# Patient Record
Sex: MALE
[De-identification: names, ages, dates, MRNs, and addresses within clinical notes are randomized per-mention and may not be internally consistent; named-entity substitution may affect disease eponyms.]

---

## 2018-07-13 NOTE — RAD
Date of service: 



07/13/2018



PROCEDURE:  Radiographs of the pelvis.



HISTORY:

fall r/o fx



COMPARISON:

None.



FINDINGS:



BONES:

Pelvic Bones: Unremarkable.



Hips: Grossly unremarkable.



JOINTS:

Sacroiliac Joints: Unremarkable.



Pubic Symphysis: Unremarkable.



OTHER FINDINGS:

None.



IMPRESSION:

Unremarkable radiographs of the pelvis.

## 2018-07-13 NOTE — RAD
Date of service: 



07/13/2018



PROCEDURE:  Radiographs of the Lumbar Spine.



HISTORY:

fall r/o fx







COMPARISON:

No prior.



FINDINGS:



BONES:

Normal alignment. No listhesis. No fracture.



DISC SPACES:

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



OTHER FINDINGS:

None.



IMPRESSION:

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

## 2018-07-13 NOTE — CT
Date of service: 



07/13/2018



PROCEDURE:  CT HEAD WITHOUT CONTRAST.



HISTORY:

head injury r/o ICH



COMPARISON:

None available. 



TECHNIQUE:

Axial computed tomography images were obtained through the head/brain 

without intravenous contrast.  



Radiation dose:



Total exam DLP = 889 mGy-cm.



This CT exam was performed using one or more of the following dose 

reduction techniques: Automated exposure control, adjustment of the 

mA and/or kV according to patient size, and/or use of iterative 

reconstruction technique.



FINDINGS:



HEMORRHAGE:

No intracranial hemorrhage. 



BRAIN:

No mass effect or edema.  No atrophy or chronic microvascular 

ischemic changes.



VENTRICLES:

Unremarkable. No hydrocephalus. 



CALVARIUM:

Unremarkable.



PARANASAL SINUSES:

Unremarkable as visualized. No significant inflammatory changes.



MASTOID AIR CELLS:

Unremarkable as visualized. No inflammatory changes.



OTHER FINDINGS:

None.



IMPRESSION:

No acute findings

## 2018-07-13 NOTE — ED PDOC
Arrival/HPI





- General


Time Seen by Provider: 07/13/18 10:41


Historian: Patient, Family





- History of Present Illness


Narrative History of Present Illness (Text): 





07/13/18 10:50


84 year old male, whose PMH is HTN, HCL, Thyroid Disease, (medications include 

a water pill), who presents to the emergency department complaining of b/l hip  

and back side of head pain s/p mechanical fall x2. Family member reports 

patient uses walker for assistance and tripped while on the street one week ago

, injuring the back of his head. The second time patient fell was one day ago, 

also a mechanical fall, also injuring the back of his head. Patient states s/p 

fall he felt mildly dizzy and weak, which has resolved. Currently he has no 

pain. Patient denies LOC, chest pain, rib pain, shortness of breath, nausea, 

vomiting, diarrhea, abdominal pain, dysuria, fever, or other complaints. 





PMD: Dr. Galo 











Time/Duration: 1 week


Symptom Onset: Sudden


Symptom Course: Unchanged


Context: Tripped





Past Medical History





- Provider Review


Nursing Documentation Reviewed: Yes





Family/Social History





- Physician Review


Nursing Documentation Reviewed: Yes


Family/Social History: Unknown Family HX





Allergies/Home Meds


Allergies/Adverse Reactions: 


Allergies





No Known Allergies Allergy (Verified 07/13/18 10:40)


 








Home Medications: 


 Home Meds











 Medication  Instructions  Recorded  Confirmed


 


Acetaminophen [Non-Aspirin Pain 500 mg PO BID 07/13/18 07/13/18





Relief]   


 


Aspirin [Adult Low Dose Aspirin EC] 81 mg PO DAILY 07/13/18 07/13/18


 


Carvedilol [Coreg] 12.5 mg PO BID 07/13/18 07/13/18


 


Furosemide [Lasix] 40 mg PO DAILY 07/13/18 07/13/18


 


Glimepiride [amaRYL] 4 mg PO QAM 07/13/18 07/13/18


 


Levothyroxine [Synthroid] 25 mcg PO DAILY 07/13/18 07/13/18


 


Nateglinide [Starlix] 120 mg PO BID 07/13/18 07/13/18


 


Ramipril [Altace] 10 mg PO DAILY 07/13/18 07/13/18


 


Simvastatin 10 mg PO DAILY 07/13/18 07/13/18














Review of Systems





- Review of Systems


Constitutional: absent: Fevers


ENT: absent: Sinus Congestion


Respiratory: absent: SOB


Cardiovascular: absent: Chest Pain


Gastrointestinal: absent: Abdominal Pain


Genitourinary Male: absent: Frequency


Musculoskeletal: Back Pain (lower back ), Other (b/l hip pain )


Skin: absent: Rash


Neurological: Headache, Dizziness


Endocrine: absent: Diaphoresis





Physical Exam


Vital Signs











  Temp Pulse Resp BP Pulse Ox


 


 07/13/18 13:22   67  22   96


 


 07/13/18 10:40  98.1 F  84  24  154/92 H  97











Temperature: Afebrile


Blood Pressure: Normal


Pulse: Regular


Respiratory Rate: Normal


Appearance: Positive for: Well-Appearing, Non-Toxic, Comfortable


Pain Distress: None


Mental Status: Positive for: Alert and Oriented X 3





- Systems Exam


Head: Present: Atraumatic, Normocephalic


Pupils: Present: PERRL


Extroacular Muscles: Present: EOMI


Conjunctiva: Present: Normal


Mouth: Present: Moist Mucous Membranes


Neck: Present: Normal Range of Motion.  No: MIDLINE TENDERNESS, Paraspinal 

Tenderness


Respiratory/Chest: Present: Clear to Auscultation, Good Air Exchange.  No: 

Respiratory Distress, Accessory Muscle Use


Cardiovascular: Present: Regular Rate and Rhythm, Normal S1, S2.  No: Murmurs


Abdomen: Present: Normal Bowel Sounds.  No: Tenderness, Distention, Peritoneal 

Signs, Rebound, Guarding


Back: Present: Paraspinal Tenderness (lower lumbar paraspinal tenderness).  No: 

CVA Tenderness, Midline Tenderness, Pain with Leg Raise


Lower Extremity: Present: Normal Inspection, NORMAL PULSES, Normal ROM, 

Neurovascularly Intact, Capillary Refill < 2 s, Other (good hip rotation).  No: 

Edema, Cyanosis, Tenderness, Swelling, Erythema, Deformity


Neurological: Present: GCS=15, CN II-XII Intact, Speech Normal, Motor Func 

Grossly Intact, Normal Sensory Function, Normal Cerebellar Funct


Skin: Present: Warm, Dry, Normal Color.  No: Rashes


Psychiatric: Present: Alert, Oriented x 3, Normal Insight, Normal Concentration





Medical Decision Making


ED Course and Treatment: 





07/13/18 


Impression:


84 year old male with lower lumbar paraspinal tenderness complaining of left 

sided hip pain and head trauma s/p mechanical fall. 








Differential Diagnosis included but are not limited to:  Mechanical fall w/ 

head injury


Plan:


-- CT head


-- LS spine x-ray


-- Pelvis x-ray 


-- Tylenol 


-- Reassess and disposition








Progress Notes:








07/13/18 12:10


Creator: Asher Kwan 





Lumbar Spine x-ray


Impressions: Severe degenration at L1-2 and L3-4





Pelvis x-ray: 


Impressions: Unremarkable radiographs of the pelvis.





07/13/18 12:15


CT head


Impressions: No acute disease








07/13/18 13:22


Patients Xrays and CT are with no fracture or ICH. Patient is comfortable and 

has no pain. He is able to walk with walker at baseline. Case discussed with 

patient and his family. He will go home and f/u with his PMD. He was advised to 

return to the ED if symptoms worsen or any other concern. 





- RAD Interpretation


Radiology Orders: 








07/13/18 10:48


HEAD W/O CONTRAST [CT] Stat 


LS SPINE WITH OBL > 18 YRS OLD [RAD] Stat 


PELVIS ONE VIEW [RAD] Stat 











: Radiologist





- Medication Orders


Current Medication Orders: 











Discontinued Medications





Acetaminophen (Tylenol 325mg Tab)  650 mg PO STAT STA


   Stop: 07/13/18 10:50











- Scribe Statement


The provider has reviewed the documentation as recorded by the Darenibe





Alexus Martinez





Provider Scribe Attestation:


All medical record entries made by the Scribe were at my direction and 

personally dictated by me. I have reviewed the chart and agree that the record 

accurately reflects my personal performance of the history, physical exam, 

medical decision making, and the department course for this patient. I have 

also personally directed, reviewed, and agree with the discharge instructions 

and disposition. 





Disposition/Present on Arrival





- Present on Arrival


Any Indicators Present on Arrival: No





- Disposition


Have Diagnosis and Disposition been Completed?: Yes


Diagnosis: 


 Fall, Head injury, Hip pain, bilateral





Disposition: HOME/ ROUTINE


Disposition Time: 13:22


Patient Plan: Discharge


Condition: IMPROVED


Discharge Instructions (ExitCare):  Closed Head Injury


Additional Instructions: 





ZOEY DOUGHERTY, thank you for letting us take care of you today. Your 

provider was Sergio Banda DO and you were treated for Mechanical Fall, Hip 

Pain , Head Injury. The emergency medical care you received today was directed 

at your acute symptoms. If you were prescribed any medication, please fill it 

and take as directed. It may take several days for your symptoms to resolve. 

Return to the Emergency Department if your symptoms worsen, do not improve, or 

if you have any other problems.





Please contact your doctor or call one of the physicians/clinics you have been 

referred to that are listed on the Patient Visit Information form that is 

included in your discharge packet. Bring any paperwork you were given at 

discharge with you along with any medications you are taking to your follow up 

visit. Our treatment cannot replace ongoing medical care by a primary care 

provider outside of the emergency department.





Thank you for allowing the [facility] team to be part of your care today.








If you had an X-Ray or CT scan: A Radiologist will review the ED reading if any 

change in treatment is needed we will contact you.***





If you had a blood, urine, or wound culture: It will take several days for the 

results, if any change in treatment is needed we will contact you.***





If you had an STI test: It will take 48 hours for the results. Please call 

after 1 week if you have not heard back.***


Referrals: 


Non [facility] Provider, [Non-Staff] - Follow up with primary


Forms:  [employer] (English)

## 2018-09-29 NOTE — ED PDOC
Arrival/HPI





- General


Chief Complaint: Male Genitourinary


Time Seen by Provider: 09/29/18 09:16


Historian: Patient, Family (Nephew)





- History of Present Illness


Time/Duration: Other (3 months)


Symptom Onset: Gradual


Symptom Course: Unchanged


Quality: Aching


Severity Level: Mild


Activities at Onset: Rest


Associated Symptoms (Text): 





09/29/18 09:27


Nephew reports a 3 month history of right-sided abdominal pain with urinary 

frequency and urgency. He was seen at another hospital in [place] for similar 

complaints and the nephew is not aware of the diagnosis. No nausea or vomiting 

or diarrhea. No hematuria. No back pain. No fever or chills. No injury or 

trauma.





Past Medical History





- Cardiac


Hx Cardiac Disorders: Yes


Hx MI: Yes


Hx Hypertension: Yes





- Pulmonary


Hx Respiratory Disorders: No





- Neurological


Hx Neurological Disorder: No





- HEENT


Hx HEENT Disorder: Yes


Hx Cataracts: Yes





- Renal


Hx Renal Disorder: No





- Endocrine/Metabolic


Hx Endocrine Disorders: Yes


Hx Diabetes Mellitus Type 2: Yes


Hx Hypothyroidism: Yes





- Hematological/Oncological


Hx Blood Disorders: No





- Integumentary


Hx Dermatological Disorder: No





- Musculoskeletal/Rheumatological


Hx Musculoskeletal Disorders: No





- Gastrointestinal


Hx Gastrointestinal Disorders: No





- Genitourinary/Gynecological


Hx Genitourinary Disorders: No





- Psychiatric


Hx Psychophysiologic Disorder: No


Hx Substance Use: No





- Surgical History


Hx Open Heart Surgery: Yes





Family/Social History





- Physician Review


Nursing Documentation Reviewed: Yes


Family/Social History: Unknown Family HX


Smoking Status: Never Smoked


Hx Alcohol Use: No


Hx Substance Use: No





Allergies/Home Meds


Allergies/Adverse Reactions: 


Allergies





No Known Allergies Allergy (Verified 09/29/18 09:18)


   








Home Medications: 


                                    Home Meds











 Medication  Instructions  Recorded  Confirmed


 


Aspirin [Adult Low Dose Aspirin EC] 81 mg PO DAILY 07/13/18 09/29/18


 


Carvedilol [Coreg] 12.5 mg PO BID 07/13/18 09/29/18


 


Furosemide [Lasix] 40 mg PO DAILY 07/13/18 09/29/18


 


Glimepiride [amaRYL] 4 mg PO QAM 07/13/18 09/29/18


 


Levothyroxine [Synthroid] 25 mcg PO DAILY 07/13/18 09/29/18


 


Nateglinide [Starlix] 120 mg PO BID 07/13/18 09/29/18


 


Ramipril [Altace] 10 mg PO DAILY 07/13/18 09/29/18


 


Simvastatin 10 mg PO DAILY 07/13/18 09/29/18














Review of Systems





- Physician Review


All systems were reviewed & negative as marked: Yes





- Review of Systems


Constitutional: Fatigue.  absent: Fevers


Respiratory: absent: SOB, Cough, Wheezing


Cardiovascular: absent: Chest Pain, Palpitations, Syncope


Gastrointestinal: Abdominal Pain, Anorexia.  absent: Constipation, Diarrhea, 

Nausea, Vomiting


Genitourinary Male: Dysuria, Frequency.  absent: Hematuria


Musculoskeletal: absent: Back Pain


Neurological: absent: Headache, Dizziness, Focal Weakness





Physical Exam





Vital Signs











  Temp Pulse Resp BP Pulse Ox


 


 09/29/18 09:15  98.1 F  69  18  153/87 H  99











Temperature: Afebrile


Blood Pressure: Hypertensive


Pulse: Regular


Respiratory Rate: Normal


Appearance: Positive for: Well-Appearing, Non-Toxic, Comfortable, Other (pale)


Pain Distress: None


Mental Status: Positive for: other (awake and alert)





- Systems Exam


Head: Present: Atraumatic, Normocephalic


Pupils: Present: PERRL


Extroacular Muscles: Present: EOMI


Conjunctiva: Present: Normal


Mouth: Present: Moist Mucous Membranes


Pharnyx: No: ERYTHEMA, EXUDATE, TONSILS ENLARGED


Neck: Present: Normal Range of Motion


Respiratory/Chest: Present: Clear to Auscultation, Good Air Exchange, Decreased 

Breath Sounds.  No: Respiratory Distress, Accessory Muscle Use


Cardiovascular: Present: Regular Rate and Rhythm, Normal S1, S2.  No: Murmurs


Abdomen: Present: Tenderness (+/- right lower quadrant tenderness).  No: 

Distention, Peritoneal Signs, Rebound, Guarding


Genitourinary Male: Present: Normal External Genitalia


Back: Present: Normal Inspection.  No: CVA Tenderness, Midline Tenderness, 

Paraspinal Tenderness


Upper Extremity: Present: Normal Inspection.  No: Cyanosis, Edema


Lower Extremity: Present: Normal Inspection.  No: Edema


Neurological: Present: GCS=15, CN II-XII Intact, Speech Normal, Motor Func 

Grossly Intact


Skin: Present: Warm, Dry, Pale.  No: Rashes


Psychiatric: Present: Alert, Oriented x 3, Normal Insight, Normal Concentration





Medical Decision Making


ED Course and Treatment: 





09/29/18 11:25


EKG shows normal sinus rhythm with a sinus bradycardia rate approximately 55 

with PACs, LVH, nonspecific intraventricular conduction delay and nonspecific ST

and T-wave changes laterally








PROCEDURE:  CT Abdomen and Pelvis without intravenous contrast


Dictator : Avtar Prieto MD


Report Date : 09/29/2018 12:07:13


IMPRESSION:


Cholelithiasis without CT evidence of acute cholecystitis.  





Additional benign and/or incidental findings described above.





Findings in the spinal canal which would benefit from additional clinical 

information and possible elective follow-up.


09/29/18 12:15


Discussed in detail with the patient and nephew. Will provide a primary 

caregiver in this area, as he is from [place]. No acute findings on his workup. 

Follow up in the ER as needed.





- RAD Interpretation


Radiology Orders: 











09/29/18 09:25


ABD & PELVIS W/O PO OR IV CONT [CT] Stat 








CT scan of the abdomen and pelvis is read by the radiologist is positive for 

cholelithiasis without cholecystitis and a right inguinal hernia. No acute 

findings.


: Radiologist





Disposition/Present on Arrival





- Present on Arrival


Any Indicators Present on Arrival: No


History of DVT/PE: No


History of Uncontrolled Diabetes: No


Urinary Catheter: No


History of Decub. Ulcer: No


History Surgical Site Infection Following: None





- Disposition


Have Diagnosis and Disposition been Completed?: Yes


Diagnosis: 


 Dysuria, Abdominal pain, Cholelithiasis, Right inguinal hernia





Disposition: HOME/ ROUTINE


Disposition Time: 12:17


Patient Plan: Discharge


Condition: GOOD


Discharge Instructions (ExitCare):  Inguinal and Femoral (Groin) Hernias, 

Chronic Pain (DC), Dysuria, Adult (DC), Gallstones


Prescriptions: 


Tramadol HCl [Ultram] 50 mg PO Q6 PRN #10 tab


 PRN Reason: Pain


Referrals: 


Avtar Corcoran DO [Staff Provider] - Follow up with primary


Forms:  [employer] (English)

## 2018-09-29 NOTE — CT
Date of service: 



09/29/2018



PROCEDURE:  CT Abdomen and Pelvis without intravenous contrast



HISTORY:

right sided pain



COMPARISON:

None.



TECHNIQUE:

Unenhanced.  Neither IV nor oral contrast administered



Radiation dose:



Total exam DLP = 338.56 mGy-cm.



This CT exam was performed using one or more of the following dose 

reduction techniques: Automated exposure control, adjustment of the 

mA and/or kV according to patient size, and/or use of iterative 

reconstruction technique.



FINDINGS:



LOWER THORAX:

Trace bilateral pleural effusions. 



LIVER:

Unremarkable. No gross lesion or ductal dilatation.  



GALLBLADDER AND BILE DUCTS:

Cholelithiasis without CT evidence of acute cholecystitis.   



PANCREAS:

Unremarkable. No gross lesion or ductal dilatation.



SPLEEN:

Unremarkable. 



ADRENALS:

Unremarkable. No mass. 



KIDNEYS AND URETERS:

Unremarkable. No hydronephrosis. No solid mass. 



VASCULATURE:

Unremarkable. No aortic aneurysm. 



BOWEL:

Unremarkable. No obstruction. No gross mural thickening. 



APPENDIX:

No abnormalities to suggest acute appendicitis. No right lower 

quadrant inflammatory processes identified. 



PERITONEUM:

Unremarkable. No free fluid. No free air. 



LYMPH NODES:

Unremarkable. No enlarged lymph nodes. 



BLADDER:

Bladder wall thickening likely relates to underfilling rather than 

cystitis. 



REPRODUCTIVE:

Unremarkable. 



BONES:

No acute fracture.  Intradural calcifications at the level of lower 

lumbar and upper sacral region.  The etiology, significance is 

uncertain.  These findings are usually seen in the setting 

parathyroid or renal disease common nephrogenic fibrosis.  This 

sequela of prior hemorrhage or trauma could also assume this 

appearance.  Elective follow-up recommended. 



OTHER FINDINGS:

Right inguinal hernia containing distal small bowel and possibly 

cecum.



IMPRESSION:

Cholelithiasis without CT evidence of acute cholecystitis.  



Additional benign and/or incidental findings described above.



Findings in the spinal canal which would benefit from additional 

clinical information and possible elective follow-up.

## 2018-09-30 NOTE — CARD
--------------- APPROVED REPORT --------------





Date of service: 09/29/2018



EKG Measurement

Heart [id number]

OR [id number]

[id number]

[id number]

[id number]



<Conclusion>

Sinus bradycardia with premature supraventricular complexes

Incomplete left bundle branch block

Left ventricular hypertrophy with repolarization abnormality

STTW changes c/w ischemia

## 2019-02-11 NOTE — ED PDOC
Arrival/HPI





- General


Chief Complaint: Shortness Of Breath


Time Seen by Provider: 02/11/19 20:11


Historian: Patient





- History of Present Illness


Narrative History of Present Illness (Text): 





02/11/19 20:53


85 year old male, whose past medical history includes asthma, hypertension, 

hypercholesterolemia, thyroid Disease, and non-insulin dependent diabetes, 

presents to the emergency department complaining of shortness of breath, for 3 

days. The patient is accompanied by his grandson, who explains that patient has 

no PMD in the area, and is mostly residing in [place]. He states that patient 

has a hernia, with no surgical intervention.  Patient states he has no pain, 

just feels ill and short of breath.  Patient denies any chest pain, fevers, 

chills, headache, dizziness, abdominal pain, nausea, vomiting, diarrhea, back 

pain, neck pain, or any other complaint.


 











Time/Duration: < week (3 days)


Symptom Onset: Gradual


Symptom Course: Unchanged


Quality: Unable to Describe


Severity Level: Moderate


Activities at Onset: Rest


Context: Home





Past Medical History





- Provider Review


Nursing Documentation Reviewed: Yes





- Infectious Disease


Hx of Infectious Diseases: None





- Cardiac


Hx Cardiac Disorders: Yes


Hx MI: Yes


Hx Hypertension: Yes





- Pulmonary


Hx Respiratory Disorders: No





- Neurological


Hx Neurological Disorder: No





- HEENT


Hx HEENT Disorder: Yes


Hx Cataracts: Yes





- Renal


Hx Renal Disorder: No





- Endocrine/Metabolic


Hx Endocrine Disorders: Yes


Hx Diabetes Mellitus Type 2: Yes


Hx Hypothyroidism: Yes





- Hematological/Oncological


Hx Blood Disorders: No





- Integumentary


Hx Dermatological Disorder: No





- Musculoskeletal/Rheumatological


Hx Musculoskeletal Disorders: No





- Gastrointestinal


Hx Gastrointestinal Disorders: No





- Genitourinary/Gynecological


Hx Genitourinary Disorders: No





- Psychiatric


Hx Psychophysiologic Disorder: No


Hx Substance Use: No





- Surgical History


Hx Open Heart Surgery: Yes





Family/Social History





- Physician Review


Nursing Documentation Reviewed: Yes


Family/Social History: No Known Family HX


Smoking Status: Never Smoked


Hx Alcohol Use: No


Hx Substance Use: No





Allergies/Home Meds


Allergies/Adverse Reactions: 


Allergies





No Known Allergies Allergy (Verified 09/29/18 09:18)


   








Home Medications: 


                                    Home Meds











 Medication  Instructions  Recorded  Confirmed


 


Aspirin [Adult Low Dose Aspirin EC] 81 mg PO DAILY 07/13/18 02/11/19


 


Carvedilol [Coreg] 12.5 mg PO BID 07/13/18 02/11/19


 


Furosemide [Lasix] 40 mg PO DAILY 07/13/18 02/11/19


 


Glimepiride [amaRYL] 4 mg PO QAM 07/13/18 02/11/19


 


Levothyroxine [Synthroid] 25 mcg PO DAILY 07/13/18 02/11/19


 


Nateglinide [Starlix] 120 mg PO BID 07/13/18 02/11/19


 


RX: Ramipril [Altace] 10 mg PO DAILY 07/13/18 02/11/19


 


RX: Simvastatin 10 mg PO DAILY 07/13/18 02/11/19














Review of Systems





- Physician Review


All systems were reviewed & negative as marked: Yes





- Review of Systems


Constitutional: absent: Fevers, Night Sweats


Respiratory: SOB


Cardiovascular: absent: Chest Pain


Gastrointestinal: absent: Abdominal Pain, Diarrhea, Nausea, Vomiting


Musculoskeletal: absent: Back Pain, Neck Pain


Neurological: absent: Headache, Dizziness





Physical Exam


Vital Signs Reviewed: Yes





Vital Signs











  Pulse Resp BP Pulse Ox


 


 02/11/19 20:21  67  18  126/65  90 L











Blood Pressure: Normal


Pulse: Regular


Respiratory Rate: Normal


Appearance: Positive for: Well-Appearing, Non-Toxic, Comfortable


Pain Distress: None


Mental Status: Positive for: Alert and Oriented X 3





- Systems Exam


Head: Present: Atraumatic, Normocephalic


Pupils: Present: PERRL


Extroacular Muscles: Present: EOMI


Conjunctiva: Present: Normal


Mouth: Present: Moist Mucous Membranes


Neck: Present: Normal Range of Motion


Respiratory/Chest: Present: Clear to Auscultation, Good Air Exchange.  No: 

Respiratory Distress, Accessory Muscle Use


Cardiovascular: Present: Regular Rate and Rhythm, Normal S1, S2.  No: Murmurs


Abdomen: Present: Tenderness (LLQ tenderness), Distention.  No: Rebound, 

Guarding


Back: Present: Normal Inspection


Upper Extremity: Present: Normal Inspection.  No: Cyanosis, Edema


Lower Extremity: Present: Edema (Piting edema)


Neurological: Present: GCS=15, Speech Normal


Skin: Present: Warm, Dry, Normal Color.  No: Rashes


Psychiatric: Present: Alert, Oriented x 3, Normal Insight, Normal Concentration





Medical Decision Making


ED Course and Treatment: 





02/11/19 21:21


Impression: 


85 year old male presents with shortness of breath





Plan:


-- VBG


--Labs


-- EKG


-- Chest X-ray


-- Urinalysis 


--Aspirin


--Lasix


-- Reassess and disposition





Prior Visits:


Notes and results from previous visits were reviewed. 





Progress Notes:


02/11/19 21:37


Labs reviewed with elevated troponin of 0.46 and BNP of 13,700. Rectal aspirin 

ordered. 





02/11/19 21:47


Spoke to medical resident. Page placed to Dr. SAMUEL Arias(house staff). Lasix 

ordered. 











- Lab Interpretations


Lab Results: 





                                        











pO2  18 mm/Hg (30-55)  L  02/11/19  20:35    


 


VBG pH  7.37  (7.32-7.43)   02/11/19  20:35    


 


VBG pCO2  47.0  (40-60)   02/11/19  20:35    


 


VBG HCO3  27.2 mmol/l (21-28)   02/11/19  20:35    


 


VBG Total CO2  28.6 mmol.L (22-28)  H  02/11/19  20:35    


 


VBG O2 Sat (Calc)  35.0 % (40-65)  L  02/11/19  20:35    


 


VBG Base Excess  1.3 mmol/L (0.0-2.0)   02/11/19  20:35    


 


VBG Potassium  3.6 mmol/L (3.6-5.2)   02/11/19  20:35    


 


Sodium  140.0 mmol/L (132-148)   02/11/19  20:35    


 


Chloride  105.0 mmol/L ()   02/11/19  20:35    


 


Glucose  227 mg/dl ()  H  02/11/19  20:35    


 


Lactate  3.3 mmol/L (0.7-2.1)  H  02/11/19  20:35    


 


FiO2  21.0 %  02/11/19  20:35    


 


Crit Value Called To  Luisito vidal   02/11/19  20:35    


 


Crit Value Called By  Rohit   02/11/19  20:35    


 


Blood Gas Notified Time  2046 02/11/19  20:35    

















                                 02/11/19 20:41 





                                 02/11/19 20:41 





                                   Lab Results





02/11/19 20:41: Sodium 139, Chloride 103, Potassium 4.0, Carbon Dioxide 27, 

Anion Gap 13, BUN 27 H, Creatinine 1.3, Est GFR (African Amer) > 60, Est GFR 

(Non-Af Amer) 52, Random Glucose 221 H, Calcium 9.3, Magnesium 2.0, Total 

Bilirubin 2.0 H, AST 29, ALT < 6 L, Alkaline Phosphatase 68, Troponin I 0.46 H* 

D, NT-Pro-B Natriuret Pep 54633 H, Total Protein 6.8, Albumin 3.9, Globulin 3.0,

 Albumin/Globulin Ratio 1.3


02/11/19 20:41: PT 16.9 H, INR 1.52, APTT 32.2


02/11/19 20:41: WBC 5.3, RBC 4.31, Hgb 13.7 L, Hct 41.9 L, MCV 97.2, MCH 31.8, 

MCHC 32.7, RDW 13.7, Plt Count 175, MPV 9.9, Neut % (Auto) 77.8 H, Lymph % 

(Auto) 11.2 L, Mono % (Auto) 8.4 H, Eos % (Auto) 2.4, Baso % (Auto) 0.2, Lymph #

 (Auto) 0.6 L, Mono # (Auto) 0.5, Eos # (Auto) 0.1, Baso # (Auto) 0.01, Absolute

 Neuts (auto) 4.15


02/11/19 20:35: pO2 18 L, VBG pH 7.37, VBG pCO2 47.0, VBG HCO3 27.2, VBG Total 

CO2 28.6 H, VBG O2 Sat (Calc) 35.0 L, VBG Base Excess 1.3, VBG Potassium 3.6, 

Sodium 140.0, Chloride 105.0, Glucose 227 H, Lactate 3.3 H, FiO2 21.0, Crit 

Value Called To Luisito vidal, Crit Value Called By Rohit, Blood Gas Notified 

Time 2046, Venous Blood Potassium 3.6








I have reviewed the lab results: Yes





- RAD Interpretation


Radiology Orders: 











02/11/19 20:22


CHEST PORTABLE [RAD] Stat 














- EKG Interpretation


EKG Interpretation (Text): 





02/11/19 21:39


NSR @ 68bpm


LVH w/ inverted T waves in V4-V6


LBBB


Prolonged QT interval





Interpreted by ED Physician: Yes


Type: 12 lead EKG





- Medication Orders


Current Medication Orders: 





02/15/19 07:46











Acetaminophen (Tylenol 325mg Tab)  650 mg PO Q6 PRN


   PRN Reason: TEMP>=99.5F


Acetaminophen (Tylenol 650 Mg Supp)  650 mg RC Q6H PRN


   PRN Reason: TEMP>=99.5F


Acetylcysteine (Acetylcysteine 20%)  4 ml IH Q8 [facility]


   Last Admin: 02/15/19 07:32 Dose:  Not Given


   Non-Admin Reason: not available





Atorvastatin Calcium (Lipitor)  40 mg PO DIN [facility]


   Last Admin: 02/14/19 16:50 Dose:  Not Given


   Non-Admin Reason: bleeding





Carvedilol (Coreg)  12.5 mg PO BID [facility]


   Last Admin: 02/14/19 11:11  Dose: 12.5 mg





MAR Pulse and Blood Pressure


 Document     02/14/19 11:11  LAUREN  (Rec: 02/14/19 11:12  LAUREN  [id number])


     Pulse


      Pulse Rate (60-90 beats/min)               65





Dextrose (Dextrose 50% Inj)  0 ml IV STAT PRN; Protocol


   PRN Reason: Hypoglycemia Protocol


   Last Admin: 02/14/19 21:52  Dose: 50 ml





eMAR Start Stop


 Document     02/14/19 21:52  MHA  (Rec: 02/14/19 21:53  MHA  [id number])


     Intravenous Solution


      Start Date                                 02/14/19


      Start Time                                 21:53





Docusate Sodium (Colace)  100 mg PO TID [facility]


   Last Admin: 02/14/19 16:43 Dose:  Not Given


   Non-Admin Reason: bleeding





Last Bowel Movement


 Document     02/14/19 16:43  LAUREN  (Rec: 02/14/19 16:44  LAUREN  [id number])


     Last Bowel Movement


      Last Bowel Movement                        02/14/19





Famotidine (Pepcid)  40 mg PO HS [facility]


   Last Admin: 02/14/19 21:36  Dose: 40 mg





Furosemide (Lasix)  40 mg IVP DAILY [facility]


   Last Admin: 02/14/19 11:12  Dose: 40 mg





MAR Blood Pressure


 Document     02/14/19 11:12  LAUREN  (Rec: 02/14/19 11:13  LAUREN  [id number])


     Blood Pressure


      Blood Pressure (100//90 mm Hg)       138/68


IVP Administration


 Document     02/14/19 11:12  LAUERN  (Rec: 02/14/19 11:13  LAUREN  [id number])


     Charges for Administration


      # of IVP Administrations                   7





Doxycycline Hyclate 100 mg/ (Sodium Chloride)  100 mls @ 100 mls/hr IVPB Q12 

GRISELDA; Protocol


   Last Admin: 02/14/19 21:36  Dose: 100 mls/hr





eMAR Start Stop


 Document     02/14/19 21:36  MHA  (Rec: 02/14/19 21:37  MHA  [id number])


     Intravenous Solution


      Start Date                                 02/14/19


      Start Time                                 21:36


      End Date                                   02/14/19


      End time                                   22:36


      Total Infusion Time                        60





Ceftriaxone Sodium (Rocephin 1 Gram Ivpb)  1 gm in 100 mls @ 100 mls/hr IVPB 

DAILY GRISELDA; Protocol


   Last Admin: 02/14/19 11:25  Dose: 100 mls/hr





eMAR Start Stop


 Document     02/14/19 11:25  LAUREN  (Rec: 02/14/19 11:26  LAURNE  [id number])


     Intravenous Solution


      Start Date                                 02/14/19


      Start Time                                 11:25


      End Date                                   02/14/19


      End time                                   12:25


      Total Infusion Time                        60





Dextrose (Dextrose 5% In Water 1000 Ml)  1,000 mls @ 0 mls/hr IV .Q0M PRN; 

Protocol


   PRN Reason: Hypoglycemia Protocol


Metronidazole (Flagyl)  500 mg in 100 mls @ 100 mls/hr IVPB Q8 GRISELDA; Protocol


   Last Admin: 02/15/19 05:20  Dose: 100 mls/hr





eMAR Start Stop


 Document     02/15/19 05:20  MHA  (Rec: 02/15/19 05:21  MHA  [id number])


     Intravenous Solution


      Start Date                                 02/15/19


      Start Time                                 05:21


      End Date                                   02/15/19


      End time                                   06:21


      Total Infusion Time                        60





Milrinone Lactate/Dextrose (Primacor 20mg/100ml D5w)  100 mls @ 8.93 mls/hr IV 

.[id number] PRN; Protocol


   PRN Reason: TITRATE PER MD ORDER


   Last Admin: 02/15/19 05:21  Dose: 0.375 mcg/kg/min, 8.93 mls/hr





eMAR Start Stop


 Document     02/15/19 05:21  MHA  (Rec: 02/15/19 05:22  [facility]HA  [id number])


     Intravenous Solution


      Start Date                                 02/15/19


      Start Time                                 05:00


Titration Intervention


 Document     02/15/19 05:21  [facility]  (Rec: 02/15/19 05:22  [id number]


     Titration Intake


      Cumulative Intake (Rx)                     200


      Waste Amount                               0


      Container Volume                           100


     Titration Dosing


      Titration Dose                             0.375


      IV Rate                                    8.93


      Intake/Decrease                            Started/Running


      Cumulative Dose                            40





Insulin Human Regular (Humulin R Med)  0 units SC ACHS [facility]; Protocol


   Last Admin: 02/14/19 21:46 Dose:  Not Given


   Non-Admin Reason: Blood Sugar Parameter





MAR Blood Glucose


 Document     02/14/19 21:46  [facility]  (Rec: 02/14/19 21:46  [place] [id number])


     Blood Glucose


      Finger Stick Blood Glucose ()        57





Levalbuterol HCl (Xopenex)  0.63 mg IH [id number] PRN


   PRN Reason: Shortness of Breath


Levalbuterol HCl (Xopenex)  0.63 mg IH Q8 [facility]


   Last Admin: 02/15/19 07:32 Dose:  Not Given


   Non-Admin Reason: not available





Levothyroxine Sodium (Synthroid)  25 mcg PO 0600 [facility]


   Last Admin: 02/15/19 05:23  Dose: 25 mcg





Magnesium Oxide (Mag-Ox)  400 mg PO BID [facility]


   Last Admin: 02/14/19 17:11  Dose: 400 mg





Ondansetron HCl (Zofran Inj)  4 mg IVP Q4H PRN


   PRN Reason: Nausea/Vomiting


Pantoprazole Sodium (Protonix Inj)  40 mg IVP Q12 [facility]


   Last Admin: 02/14/19 21:36  Dose: 40 mg





IVP Administration


 Document     02/14/19 21:36  [facility]  (Rec: 02/14/19 21:36  [id number]


     Charges for Administration


      # of IVP Administrations                   1





Potassium Chloride (Klor-Con 10)  10 meq PO BRK [facility]


Ramipril (Altace)  10 mg PO DAILY [facility]


   Last Admin: 02/14/19 11:32  Dose: 10 mg





Tramadol HCl (Ultram)  50 mg PO Q6 PRN


   PRN Reason: Pain, moderate (4-7)





Discontinued Medications





Aspirin (Aspirin Supp)  300 mg  STAT STA


   Stop: 02/11/19 21:36


   Last Admin: 02/11/19 22:04  Dose: 300 mg





Aspirin (Ecotrin)  81 mg PO DAILY [facility]


   Last Admin: 02/13/19 09:54  Dose: 81 mg





Bisacodyl (Dulcolax)  10 mg RC ONCE ONE


   Stop: 02/12/19 10:34


   Last Admin: 02/12/19 11:26  Dose: 10 mg





Clopidogrel Bisulfate (Plavix)  300 mg PO STAT STA


   Stop: 02/11/19 23:28


   Last Admin: 02/12/19 03:32  Dose: 300 mg





Clopidogrel Bisulfate (Plavix)  75 mg PO DAILY [facility]


   Last Admin: 02/13/19 09:55  Dose: 75 mg





Clopidogrel Bisulfate (Plavix)  300 mg PO STAT STA


   Stop: 02/12/19 12:14


   Last Admin: 02/12/19 14:01  Dose: 300 mg





Dextrose (Dextrose 50% Inj)  50 ml IVP ONCE ONE


   Stop: 02/14/19 21:51


Furosemide (Lasix)  40 mg IVP STAT STA


   Stop: 02/11/19 22:42


   Last Admin: 02/11/19 22:49  Dose: 40 mg





MAR Blood Pressure


 Document     02/11/19 22:49  CNR  (Rec: 02/11/19 22:50  CNR  [id number])


     Blood Pressure


      Blood Pressure (100//90 mm Hg)       130/81


IVP Administration


 Document     02/11/19 22:49  CNR  (Rec: 02/11/19 22:50  CNR  [id number])


     Charges for Administration


      # of IVP Administrations                   1





Furosemide (Lasix)  40 mg IVP BID [facility]


   Last Admin: 02/13/19 09:55  Dose: 40 mg





MAR Blood Pressure


 Document     02/13/19 09:55  LMN  (Rec: 02/13/19 09:55  LMN  [id number])


     Blood Pressure


      Blood Pressure (100//90 mm Hg)       119/69


IVP Administration


 Document     02/13/19 09:55  LMN  (Rec: 02/13/19 09:55  LMN  [id number])


     Charges for Administration


      # of IVP Administrations                   1





Furosemide (Lasix)  20 mg IVP ONCE ONE


   Stop: 02/14/19 19:05


   Last Admin: 02/14/19 20:23  Dose: 20 mg





MAR Blood Pressure


 Document     02/14/19 20:23  MHA  (Rec: 02/14/19 20:33  [facility]  [id number])


     Blood Pressure


      Blood Pressure (100//90 mm Hg)       123/72


IVP Administration


 Document     02/14/19 20:23  MHA  (Rec: 02/14/19 20:33  [facility]  [id number])


     Charges for Administration


      # of IVP Administrations                   1





Heparin Sodium/Sodium Chloride (Heparin 41027 Units/250ml 1/2 Normal Saline)  

25,000 units in 250 mls @ 9.525 mls/hr IV .Q24H GRISELDA; Protocol


   Last Admin: 02/12/19 03:28  Dose: 12 units/kg/hr, 9.525 mls/hr





eMAR Start Stop


 Document     02/12/19 03:28  CDE  (Rec: 02/12/19 03:31  CDE  [id number])


     Intravenous Solution


      Start Date                                 02/12/19


      Start Time                                 03:31


Titration Intervention


 Document     02/12/19 03:28  CDE  (Rec: 02/12/19 03:31  CDE  [id number])


     Titration Intake


      Waste Amount                               0


      Container Volume                           250


     Titration Dosing


      Titration Dose                             12


      IV Rate                                    9.525


      Intake/Decrease                            Started





Sodium Chloride (Sodium Chloride 0.9%)  1,000 mls @ 75 mls/hr IV .[id number] STA


   Stop: 02/13/19 01:40


   Last Admin: 02/12/19 15:51  Dose: 75 mls/hr





eMAR Start Stop


 Document     02/12/19 15:51  DC  (Rec: 02/12/19 15:52  DC  [id number])


     Intravenous Solution


      Start Date                                 02/12/19


      Start Time                                 15:51





Sodium Chloride (Sodium Chloride 0.9%)  1,000 mls @ 100 mls/hr IV .Q10H GRISELDA


   Stop: 02/13/19 18:00


   Last Admin: 02/13/19 14:55  Dose: 100 mls/hr





eMAR Start Stop


 Document     02/13/19 14:55  LMN  (Rec: 02/13/19 15:56  LMN  [id number])


     Intravenous Solution


      Start Date                                 02/13/19


      Start Time                                 14:55





Magnesium Sulfate (Magnesium Sulfate 2 Gm/50 Ml Water)  2 gm in 50 mls @ 50 

mls/hr IVPB ONCE ONE


   Stop: 02/14/19 09:00


   Last Admin: 02/14/19 14:12  Dose: 50 mls/hr





eMAR Start Stop


 Document     02/14/19 14:12  LAUREN  (Rec: 02/14/19 17:13  LAUREN  [id number])


     Intravenous Solution


      Start Date                                 02/14/19


      Start Time                                 14:05


      End Date                                   02/14/19


      End time                                   15:05


      Total Infusion Time                        60





Potassium Chloride (Potassium Chloride 10 Meq/100 Ml)  10 meq in 100 mls @ 50 

mls/hr IVPB Q2H GRISELDA


   Stop: 02/14/19 16:14


   Last Admin: 02/14/19 16:58  Dose: 50 mls/hr





eMAR Start Stop


 Document     02/14/19 16:58  LAUREN  (Rec: 02/14/19 16:58  LAUREN  [id number])


     Intravenous Solution


      Start Date                                 02/14/19


      Start Time                                 16:58


      End Date                                   02/14/19


      End time                                   17:55


      Total Infusion Time                        57





Magnesium Sulfate (Magnesium Sulfate 2 Gm/50 Ml Water)  2 gm in 50 mls @ 50 

mls/hr IVPB ONCE ONE


   Stop: 02/14/19 10:59


Phytonadione 10 mg/ Sodium (Chloride)  51 mls @ 100 mls/hr IV ONCE ONE


   Stop: 02/14/19 10:01


   Last Admin: 02/14/19 12:14  Dose: 100 mls/hr





eMAR Start Stop


 Document     02/14/19 12:14  LAUREN  (Rec: 02/14/19 12:14  LAUREN  [id number])


     Intravenous Solution


      Start Date                                 02/14/19


      Start Time                                 12:14


      End Date                                   02/14/19


      End time                                   13:15


      Total Infusion Time                        61





Magnesium Citrate (Citrate Of Mag)  300 ml PO ONCE ONE


   Stop: 02/14/19 16:01


   Last Admin: 02/14/19 17:00  Dose: 300 ml





Re-Assess: MAR Preperation Tolerance


 Document     02/14/19 21:00  MHA  (Rec: 02/15/19 05:20  MHA  [id number])


      Is this medication being administered in   Yes


       preparation for a colonoscopy procedure   


       ?                                         


      Was the patient able to tolerate the       Yes


       dose ordered?                             


      LIP/MD notified that patient was unable    N/A


       to tolerate:                              





Milrinone Lactate/Dextrose (Primacor 1mg/Ml Inj (10ml))  4 mg 0.05 mg/kg (4 mg) 

IVP ONCE ONE


   Stop: 02/13/19 14:34


   Last Admin: 02/13/19 15:42  Dose: 4 mg





IVP Administration


 Document     02/13/19 15:42  LMN  (Rec: 02/13/19 15:42  LMN  [id number])


     Charges for Administration


      # of IVP Administrations                   1





Polyethylene Glycol (Miralax)  17 gm PO ONCE ONE


   Stop: 02/12/19 10:34


   Last Admin: 02/12/19 12:22  Dose: 17 gm





Potassium Chloride (K-Dur 20 Meq Er Tab)  40 meq PO BID GRISELDA


   Stop: 02/12/19 18:01


   Last Admin: 02/12/19 17:48  Dose: 40 meq











- Scribe Statement


The provider has reviewed the documentation as recorded by the Marino Lay





Provider Scribe Attestation:


All medical record entries made by the Darenibmoriah were at my direction and 

personally dictated by me. I have reviewed the chart and agree that the record 

accurately reflects my personal performance of the history, physical exam, 

medical decision making, and the department course for this patient. I have also

 personally directed, reviewed, and agree with the discharge instructions and 

disposition.











Disposition/Present on Arrival





- Present on Arrival


Any Indicators Present on Arrival: No


History of DVT/PE: No


History of Uncontrolled Diabetes: No


Urinary Catheter: No


History of Decub. Ulcer: No


History Surgical Site Infection Following: None





- Disposition


Have Diagnosis and Disposition been Completed?: Yes


Diagnosis: 


 NSTEMI (non-ST elevated myocardial infarction), CHF (congestive heart failure)





Disposition: HOSPITALIZED


Disposition Time: 21:47


Patient Plan: Admission, Telemetry


Patient Problems: 


                             Current Active Problems











Problem Status Onset


 


CHF (congestive heart failure) Acute 


 


NSTEMI (non-ST elevated myocardial infarction) Acute 











Condition: GUARDED

## 2019-02-11 NOTE — CP.PCM.HP
History of Present Illness





- History of Present Illness


History of Present Illness: 





Resident H&P for Hospitalist Service





Patient is an 85 year old male with past medical history of HTN, CAD s/p CABG, 

T2DM, hyperlipidemia, asthma presenting with chief complaint of chest pain and 

shortness of breath which began about 3 days ago. Chest pain is localized to the

left anterior chest wall and described as a sharp sensation that is reproducible

with palpation. Patient had associated nausea, vomiting, and diaphoresis prior 

to the onset of chest pain. Patient has not followed up with a primary medical 

doctor in the past few years. Patient's nephew at bedsides states that he helps 

take care of the patient at home. Admits to constipation. Denies fevers, chills,

diarrhea, dysuria. 





PMH: HTN, CAD, T2DM, hyperlipidemia, asthma


PSH: CABG (1990)


SHx: denies alcohol, tobacco, illicit drug use


FHx: denies


Allergies: NKDA 


PMD: none 











Present on Admission





- Present on Admission


Any Indicators Present on Admission: No





Review of Systems





- Review of Systems


All systems: reviewed and no additional remarkable complaints except (as stated 

in HPI)





Past Patient History





- Infectious Disease


Hx of Infectious Diseases: None





- Past Social History


Smoking Status: Never Smoked





- CARDIAC


Hx Cardiac Disorders: Yes


Hx Heart Attack: Yes


Hx Hypertension: Yes





- PULMONARY


Hx Respiratory Disorders: No





- NEUROLOGICAL


Hx Neurological Disorder: No





- HEENT


Hx HEENT Problems: Yes


Hx Cataracts: Yes





- RENAL


Hx Chronic Kidney Disease: No





- ENDOCRINE/METABOLIC


Hx Endocrine Disorders: Yes


Hx Diabetes Mellitus Type 2: Yes


Hx Hypothyroidism: Yes





- HEMATOLOGICAL/ONCOLOGICAL


Hx Blood Disorders: No





- INTEGUMENTARY


Hx Dermatological Problems: No





- MUSCULOSKELETAL/RHEUMATOLOGICAL


Hx Musculoskeletal Disorders: No





- GASTROINTESTINAL


Hx Gastrointestinal Disorders: No





- GENITOURINARY/GYNECOLOGICAL


Hx Genitourinary Disorders: No





- PSYCHIATRIC


Hx Psychophysiologic Disorder: No


Hx Substance Use: No





- SURGICAL HISTORY


Hx Open Heart Surgery: Yes





Meds


Allergies/Adverse Reactions: 


                                    Allergies











Allergy/AdvReac Type Severity Reaction Status Date / Time


 


No Known Allergies Allergy   Verified 09/29/18 09:18














Physical Exam





- Constitutional


Appears: Non-toxic, No Acute Distress





- Head Exam


Head Exam: ATRAUMATIC, NORMOCEPHALIC





- Eye Exam


Eye Exam: EOMI, Normal appearance, PERRL





- ENT Exam


ENT Exam: Mucous Membranes Moist





- Neck Exam


Neck exam: Negative for: Lymphadenopathy





- Respiratory Exam


Respiratory Exam: Chest Wall Tenderness, Decreased Breath Sounds (on the left), 

Rales.  absent: Accessory Muscle Use, Rhonchi, Wheezes, Respiratory Distress





- Cardiovascular Exam


Cardiovascular Exam: REGULAR RHYTHM, JVD, +S1, +S2.  absent: Systolic Murmur





- GI/Abdominal Exam


GI & Abdominal Exam: Soft, Tenderness.  absent: Distended, Firm, Guarding, Re

bound, Rigid





- Extremities Exam


Extremities exam: Positive for: normal capillary refill, pedal pulses present.  

Negative for: tenderness


Additional comments: 





+2 pitting edema on RLE


+1 pitting edema on LLE





- Neurological Exam


Neurological exam: Alert, CN II-XII Intact, Oriented x3





- Skin


Skin Exam: Dry, Intact





Results





- Vital Signs


Recent Vital Signs: 





                                Last Vital Signs











Temp      


 


Pulse  67   02/11/19 20:21


 


Resp  18   02/11/19 20:21


 


BP  126/65   02/11/19 20:21


 


Pulse Ox  90 L  02/11/19 20:21














- Labs


Result Diagrams: 


                                 02/12/19 00:45





                                 02/12/19 00:45


Labs: 





                         Laboratory Results - last 24 hr











  02/11/19 02/11/19 02/11/19





  20:35 20:41 20:41


 


WBC   5.3 


 


RBC   4.31 


 


Hgb   13.7 L 


 


Hct   41.9 L 


 


MCV   97.2 


 


MCH   31.8 


 


MCHC   32.7 


 


RDW   13.7 


 


Plt Count   175 


 


MPV   9.9 


 


Neut % (Auto)   77.8 H 


 


Lymph % (Auto)   11.2 L 


 


Mono % (Auto)   8.4 H 


 


Eos % (Auto)   2.4 


 


Baso % (Auto)   0.2 


 


Lymph # (Auto)   0.6 L 


 


Mono # (Auto)   0.5 


 


Eos # (Auto)   0.1 


 


Baso # (Auto)   0.01 


 


Absolute Neuts (auto)   4.15 


 


PT    16.9 H


 


INR    1.52


 


APTT    32.2


 


pO2  18 L  


 


VBG pH  7.37  


 


VBG pCO2  47.0  


 


VBG HCO3  27.2  


 


VBG Total CO2  28.6 H  


 


VBG O2 Sat (Calc)  35.0 L  


 


VBG Base Excess  1.3  


 


VBG Potassium  3.6  


 


Sodium  140.0  


 


Chloride  105.0  


 


Glucose  227 H  


 


Lactate  3.3 H  


 


FiO2  21.0  


 


Crit Value Called To  Rn lazaro young  


 


Crit Value Called By  Jj  


 


Blood Gas Notified Time  2046  


 


Potassium   


 


Carbon Dioxide   


 


Anion Gap   


 


BUN   


 


Creatinine   


 


Est GFR ( Amer)   


 


Est GFR (Non-Af Amer)   


 


Random Glucose   


 


Calcium   


 


Magnesium   


 


Total Bilirubin   


 


AST   


 


ALT   


 


Alkaline Phosphatase   


 


Troponin I   


 


NT-Pro-B Natriuret Pep   


 


Total Protein   


 


Albumin   


 


Globulin   


 


Albumin/Globulin Ratio   


 


Venous Blood Potassium  3.6  














  02/11/19





  20:41


 


WBC 


 


RBC 


 


Hgb 


 


Hct 


 


MCV 


 


MCH 


 


MCHC 


 


RDW 


 


Plt Count 


 


MPV 


 


Neut % (Auto) 


 


Lymph % (Auto) 


 


Mono % (Auto) 


 


Eos % (Auto) 


 


Baso % (Auto) 


 


Lymph # (Auto) 


 


Mono # (Auto) 


 


Eos # (Auto) 


 


Baso # (Auto) 


 


Absolute Neuts (auto) 


 


PT 


 


INR 


 


APTT 


 


pO2 


 


VBG pH 


 


VBG pCO2 


 


VBG HCO3 


 


VBG Total CO2 


 


VBG O2 Sat (Calc) 


 


VBG Base Excess 


 


VBG Potassium 


 


Sodium  139


 


Chloride  103


 


Glucose 


 


Lactate 


 


FiO2 


 


Crit Value Called To 


 


Crit Value Called By 


 


Blood Gas Notified Time 


 


Potassium  4.0


 


Carbon Dioxide  27


 


Anion Gap  13


 


BUN  27 H


 


Creatinine  1.3


 


Est GFR ( Amer)  > 60


 


Est GFR (Non-Af Amer)  52


 


Random Glucose  221 H


 


Calcium  9.3


 


Magnesium  2.0


 


Total Bilirubin  2.0 H


 


AST  29


 


ALT  < 6 L


 


Alkaline Phosphatase  68


 


Troponin I  0.46 H* D


 


NT-Pro-B Natriuret Pep  72377 H


 


Total Protein  6.8


 


Albumin  3.9


 


Globulin  3.0


 


Albumin/Globulin Ratio  1.3


 


Venous Blood Potassium 














Assessment & Plan





- Assessment and Plan (Free Text)


Assessment: 





Patient is an 85 year old male with past medical history of HTN, CAD s/p CABG, 

T2DM, hyperlipidemia, asthma presenting with NSTEMI. 


Plan: 





NSTEMI


- Troponin 0.46


- Aspirin 300 mg given


- Cardiology consulted. Appreciate recs. 


- Troponins 


- EKG in AM


- TSH, lipid panel


- Aspirin 81 mg PO daily 


- Lipitor 40 mg PO daily


- continue home Coreg


- Plavix 75 mg PO daily 


- Ramipril 10 mg PO daily 


- Lasix 40 mg IVP BID 


- Heparin drip





Shortness of breath


- BNP elevated


- followup ECHO 


- Xopenex PRN 





Abdominal pain


- followup CT abd/pelvis


- afebrile, no leukocytosis  





T2DM


- Hgba1c


- ISS, Accuchecks





Hypothyroidism


- continue home Synthroid 





Case discussed with Dr. Juana Arceo PGY-1 


 





- Date & Time


Date: 02/12/19


Time: 22:08

## 2019-02-12 NOTE — CP.PCM.PCO
Physician Communication Note





- Physician Communication Note


Physician Communication Note: NSTEMI hep drip, LLL infilt. antibiotic, pleural 

eff L>R, diurese,echo pend

## 2019-02-12 NOTE — CT
Date of service: 



02/12/2019



PROCEDURE:  CT Chest without contrast



HISTORY:

suspected aspiration pneumonitis, ? pneumonia



COMPARISON:

None available.



TECHNIQUE:

Contiguous axial images were obtained through the chest without 

intravenous contrast enhancement. Sagittal and coronal 

reconstructions were performed.







Radiation dose:



Total exam DLP = 275.87 mGy-cm.



This CT exam was performed using one or more of the following dose 

reduction techniques: Automated exposure control, adjustment of the 

mA and/or kV according to patient size, and/or use of iterative 

reconstruction technique.



FINDINGS:



LUNGS:

 Patchy infiltrate in the anterior left upper lobe.  Focal 

consolidation at the left lung base adjacent to the effusion



MEDIASTINUM:

Unremarkable thoracic aorta. No aneurysm. Mild cardiomegaly.  

Coronary artery calcification main pulmonary artery unremarkable. No 

vascular congestion. No lymphadenopathy.



Aortic calcification



PLEURA:

Moderate size left pleural effusion.  Small right effusion.



BONES:

Degenerative changes with ossification of the anterior longitudinal 

ligament.  No evidence of compression fracture 



UPPER ABDOMEN:

Grossly unremarkable.



OTHER FINDINGS:

 None.



IMPRESSION:

Patchy infiltrate in the anterior left upper lobe.  Focal 

consolidation at the left lung base adjacent to the effusion.  

Moderate size left pleural effusion.  Small right effusion

## 2019-02-12 NOTE — CON
DATE OF CONSULTATION:  02/12/2019



CARDIOLOGY CONSULTATION



HISTORY:  The patient is an 85-year-old male who is from [place], who presents

with several days of progressive angina.



The patient had bypass surgery 35 years ago.  He apparently suffers from

hypertension, diabetes mellitus and hypercholesterolemia.  Workup for his

heart is unknown.



He denies shortness of breath.



SOCIAL HISTORY:  The patient does not smoke now.



REVIEW OF SYSTEMS:  He is free of bleeding history and/or history of peptic

ulcer disease.  He is on aspirin daily.



PHYSICAL EXAMINATION:

VITAL SIGNS:  Blood pressure is 157/93, the heart rate is in the 70s,

atrial fibrillation.

NECK:  Negative JVD.

LUNGS:  Without rales.

CARDIAC:  Heart rate S1, S2.

EXTREMITIES:  Without edema.



EKG is atrial fibrillation with nonspecific ST-T changes.  Troponins of

0.37.  BUN and creatinine are 27 and 1.2, potassium is 3.2.  The hemoglobin

is 14.6.



IMPRESSION:

1.  Non-ST elevation myocardial infarction

2.  Unstable angina.

3.  History of coronary bypass surgery.

4.  Coronary artery disease.

5.  Anemia.

6.  Diabetes mellitus.

7.  Hypercholesterolemia.

8.  Hypertension.



PLAN:  Given these findings, we will start the patient on aspirin.  We will

give a loading dose of Plavix 300 mg today.  IV heparin has already been

started.  I have discussed cardiac catheterization with the patient and his

family in detail.  They understand and agree.  We will schedule for

catheterization in the morning.







__________________________________________

Onel Ann MD





DD:  02/12/2019 12:26:55

DT:  02/12/2019 12:31:35

Job # [id number]

## 2019-02-12 NOTE — RAD
Date of service: 



02/11/2019



HISTORY:

 shortness of breath 



COMPARISON:

No prior. 



FINDINGS:



LUNGS:

There is an infiltrate at the left lung base which obscures the 

diaphragm.



PLEURA:

No significant pleural effusion identified, no pneumothorax apparent.



CARDIOVASCULAR:

Aortic calcification and tortuosity



Moderate cardiomegaly no pulmonary vascular congestion. 



OSSEOUS STRUCTURES:

No significant abnormalities.



VISUALIZED UPPER ABDOMEN:

Normal.



OTHER FINDINGS:

None.



IMPRESSION:

There is an infiltrate at the left lung base which obscures the 

diaphragm

## 2019-02-12 NOTE — CARD
--------------- APPROVED REPORT --------------





Date of service: 02/11/2019



EKG Measurement

Heart [id number]

DC [id number]

[id number]

[id number]

[id number]



<Conclusion>

Normal sinus rhythm

Left ventricular hypertrophy with QRS widening and repolarization 

abnormality

Prolonged QT

Abnormal ECG

## 2019-02-12 NOTE — CT
Date of service: 



02/12/2019



PROCEDURE:  CT HEAD WITHOUT CONTRAST.



HISTORY:

ams



COMPARISON:

None available.



TECHNIQUE:

Axial computed tomography images were obtained through the head/brain 

without intravenous contrast.  



Radiation dose:



Total exam DLP = 986.7 mGy-cm.



This CT exam was performed using one or more of the following dose 

reduction techniques: Automated exposure control, adjustment of the 

mA and/or kV according to patient size, and/or use of iterative 

reconstruction technique.



Examination technically limited due to patient's inability to fully 

cooperate. 



FINDINGS:



HEMORRHAGE:

No intracranial hemorrhage. 



BRAIN:

No mass effect or edema.  Mild diffuse age-appropriate atrophy.  Mild 

to moderate  periventricular white matter lucency consistent with 

microvascular white matter ischemic change.  No evidence of acute 

infarct. 



VENTRICLES:

Unremarkable. No hydrocephalus. 



CALVARIUM:

Unremarkable.



PARANASAL SINUSES:

Unremarkable as visualized. No significant inflammatory changes.



MASTOID AIR CELLS:

Unremarkable as visualized. No inflammatory changes.



OTHER FINDINGS:

None.



IMPRESSION:

No intracranial mass, hemorrhage or evidence of acute infarct.  Age 

related atrophy and chronic white matter ischemic change.



The preliminary findings for this examination were reported by [facility] at 1:34 a.m. on 2/12/2019.  There is concurrence of this 

report with the preliminary findings.

## 2019-02-12 NOTE — CP.PCM.PN
Subjective





- Date & Time of Evaluation


Date of Evaluation: 19


Time of Evaluation: 12:58





- Subjective


Subjective: 





PGY-3 for Dr Harris





No more chest pain. Cough when eating. No sick contact. No acute complaint per 

12-pt ros





Objective





- Vital Signs/Intake and Output


Vital Signs (last 24 hours): 


                                        











Temp Pulse Resp BP Pulse Ox


 


 98.4 F   62   20   135/87   95 


 


 19 06:00  19 12:25  19 06:00  19 12:25  19 06:00








Intake and Output: 


                                        











 19





 06:59 18:59


 


Intake Total 1000 


 


Output Total 1001 


 


Balance -1 














- Medications


Medications: 


                               Current Medications





Acetaminophen (Tylenol 325mg Tab)  650 mg PO Q6 PRN


   PRN Reason: TEMP>=99.5F


Acetaminophen (Tylenol 650 Mg Supp)  650 mg RC Q6H PRN


   PRN Reason: TEMP>=99.5F


Acetylcysteine (Acetylcysteine 20%)  4 ml IH Q8 [facility]


   Last Admin: 19 11:40 Dose:  4 ml


Aspirin (Ecotrin)  81 mg PO DAILY [facility]


   Last Admin: 19 11:23 Dose:  81 mg


Atorvastatin Calcium (Lipitor)  40 mg PO DIN [facility]


Carvedilol (Coreg)  12.5 mg PO BID [facility]


   Last Admin: 19 12:25 Dose:  12.5 mg


Clopidogrel Bisulfate (Plavix)  75 mg PO DAILY [facility]


   Last Admin: 19 12:25 Dose:  Not Given


Dextrose (Dextrose 50% Inj)  0 ml IV STAT PRN; Protocol


   PRN Reason: Hypoglycemia Protocol


Docusate Sodium (Colace)  100 mg PO TID [facility]ELDA


Famotidine (Pepcid)  40 mg PO HS [facility]


Furosemide (Lasix)  40 mg IVP BID [facility]


   Last Admin: 19 12:23 Dose:  40 mg


Heparin Sodium/Sodium Chloride (Heparin 78306 Units/250ml 1/2 Normal Saline)  

25,000 units in 250 mls @ 9.525 mls/hr IV .Q24H [facility]; Protocol


   Last Admin: 19 03:28 Dose:  12 units/kg/hr, 9.525 mls/hr


Doxycycline Hyclate 100 mg/ (Sodium Chloride)  100 mls @ 100 mls/hr IVPB Q12 

[facility]; Protocol


Ceftriaxone Sodium (Rocephin 1 Gram Ivpb)  1 gm in 100 mls @ 100 mls/hr IVPB 

DAILY [facility]; Protocol


   Last Admin: 19 12:22 Dose:  100 mls/hr


Dextrose (Dextrose 5% In Water 1000 Ml)  1,000 mls @ 0 mls/hr IV .Q0M PRN; 

Protocol


   PRN Reason: Hypoglycemia Protocol


Sodium Chloride (Sodium Chloride 0.9%)  1,000 mls @ 75 mls/hr IV .[id number] [facility]


   Stop: 19 01:40


Insulin Human Regular (Humulin R Med)  0 units SC ACHS [facility]; Protocol


   Last Admin: 19 08:26 Dose:  Not Given


Levalbuterol HCl (Xopenex)  0.63 mg IH [id number] PRN


   PRN Reason: Shortness of Breath


Levalbuterol HCl (Xopenex)  0.63 mg IH Q8 [facility]


   Last Admin: 19 11:40 Dose:  0.63 mg


Levothyroxine Sodium (Synthroid)  25 mcg PO 0600 [facility]


   Last Admin: 19 06:08 Dose:  25 mcg


Ondansetron HCl (Zofran Inj)  4 mg IVP Q4H PRN


   PRN Reason: Nausea/Vomiting


Potassium Chloride (K-Dur 20 Meq Er Tab)  40 meq PO BID [facility]


   Stop: 19 18:01


   Last Admin: 19 11:24 Dose:  40 meq


Ramipril (Altace)  10 mg PO DAILY [facility]


   Last Admin: 19 11:24 Dose:  10 mg


Tramadol HCl (Ultram)  50 mg PO Q6 PRN


   PRN Reason: Pain, moderate (4-7)











- Labs


Labs: 


                                        





                                 19 09:10 





                                 19 09:10 





                                        











PT  16.9 SECONDS (9.4-12.5)  H  19  20:41    


 


INR  1.52   19  20:41    


 


APTT  70.0 Seconds (26.9-38.3)  H  19  10:30    














- Constitutional


Appears: No Acute Distress





- Head Exam


Head Exam: ATRAUMATIC, NORMAL INSPECTION, NORMOCEPHALIC





- Eye Exam


Eye Exam: EOMI, Normal appearance, PERRL


Pupil Exam: NORMAL ACCOMODATION





- ENT Exam


ENT Exam: Mucous Membranes Dry





- Neck Exam


Additional comments: 





supple





- Respiratory Exam


Respiratory Exam: Decreased Breath Sounds (b/l lung bases), Clear to Ausculation

Bilateral.  absent: Rales, Rhonchi, Wheezes





- Cardiovascular Exam


Cardiovascular Exam: REGULAR RHYTHM, +S1, +S2.  absent: Murmur





- GI/Abdominal Exam


GI & Abdominal Exam: Soft, Normal Bowel Sounds.  absent: Guarding, Rigid, 

Tenderness, Rebound





- Extremities Exam


Extremities Exam: Normal Capillary Refill, Pedal Edema (slight)





- Neurological Exam


Neurological Exam: Alert, Awake, Oriented x3


Neuro motor strength exam: Left Upper Extremity: 5, Right Upper Extremity: 5, 

Left Lower Extremity: 5, Right Lower Extremity: 5





- Psychiatric Exam


Psychiatric exam: Normal Affect, Normal Mood





- Skin


Skin Exam: Dry, Warm





Assessment and Plan





- Assessment and Plan (Free Text)


Plan: 





Mr Galindo, 85M, with PMHx CAD s/p CABG in  on ASA/Plavix, HTN/HLD, DM2, 

asthma c/o Chest pain onset at rest associated with nausea, vomiting, 

diaphoresis. Last bowel movement 6 days ago. He was found to have NSTEMI





NSTEMI


Chest Pain


- Cardiac cath tomorrow 12pm


- trops 0.46, 0.41, 0.35, 0.37


- heparin gtt


- ASA/Plavix/lipitor/coreg/ramipril. zofran prn


- NS@75 per cardio





CHF acute on chronic


Pleural effusion


- lasix 40 iv bid


[  ] Echo





CAP vs Aspiration pnemonitis 


Hx Asthma


- Procalc is low 0.06


- Rocephin, doxy, flagyl (day 1)


- Mucomyst/Xopenex GRISELDA


[ ] CT chest





Dysphagia - [ ] swallow study/barium swallow. Aspiration precaution. pepsid


DM, A1C 7.1 - ISSS


hypothyrodism - synthroid 


Constipation - Colace TID, Dulcolax suppository. Surgery had ruled out 

obstruction or bowel incarceration. no plan for surgery


Pain - tramadol PRN








Imagin19 CT abd/pelvis: There is a right-sided inguinal hernia that contains a 

loop of bowel. This measures 3.4 x 5.2 cm. There is no associated obstruction.








POA: Mr Dominick Lopez [phone]





s/r/d/w Dr Harris

## 2019-02-12 NOTE — CP.PCM.CON
History of Present Illness





- History of Present Illness


History of Present Illness: 


Sriram Falk, PGY-1 Consult Note for Dr. Randle





Mr. Galindo is a pleasant, Swedish-speaking 85 year old male with past medical 

history of HTN, CAD s/p CABG in 1990 on DAPT, T2DM controlled on oral 

medications, hyperlipidemia, and asthma who presented overnight with chief 

complaint of chest pain and shortness of breath. Nephew is at bedside at states 

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

medical history. Chest pain is localized to the left anterior chest wall and 

described as a sharp sensation that is reproducible with palpation. Patient has 

experienced associated nausea, vomiting, and diaphoresis that began on Saturday.

Patient admits to constipation, with last bowel movement 6 days ago producing 1 

small fecal ball. Patient has had urge to defecate since but states that it 

feels tight. Patient currently tolerating diet, having just finished his 

breakfast without feeling nauseous. Patient further denies fevers, chills, 

nausea, vomiting, dysuria, melena, hematochezia. Patient has not followed up 

with a primary medical doctor in the past few years, as he has traveled from 

[place]. 





In the ED, patient received a CT abdomen/pelvis to r/o obstruction. 





PMH: HTN, CAD, T2DM, hyperlipidemia, asthma


PSH: CABG (1990)


SHx: denies alcohol, tobacco, illicit drug use


FHx: denies 


Allergies: NKDA 


PMD: none, although transitioning to be under Dr. Harris's service











Review of Systems





- Review of Systems


Review of Systems: 





12 point ROS completed and negative except as described in HPI.





Past Patient History





- Infectious Disease


Hx of Infectious Diseases: None





- Past Social History


Smoking Status: Never Smoked





- CARDIAC


Hx Cardiac Disorders: Yes


Hx Heart Attack: Yes


Hx Hypertension: Yes





- PULMONARY


Hx Respiratory Disorders: No





- NEUROLOGICAL


Hx Neurological Disorder: No





- HEENT


Hx HEENT Problems: Yes


Hx Cataracts: Yes





- RENAL


Hx Chronic Kidney Disease: No





- ENDOCRINE/METABOLIC


Hx Endocrine Disorders: Yes


Hx Diabetes Mellitus Type 2: Yes


Hx Hypothyroidism: Yes





- HEMATOLOGICAL/ONCOLOGICAL


Hx Blood Disorders: No





- INTEGUMENTARY


Hx Dermatological Problems: No





- MUSCULOSKELETAL/RHEUMATOLOGICAL


Hx Musculoskeletal Disorders: No





- GASTROINTESTINAL


Hx Gastrointestinal Disorders: No





- GENITOURINARY/GYNECOLOGICAL


Hx Genitourinary Disorders: No





- PSYCHIATRIC


Hx Psychophysiologic Disorder: No


Hx Substance Use: No





- SURGICAL HISTORY


Hx Open Heart Surgery: Yes





Meds


Allergies/Adverse Reactions: 


                                    Allergies











Allergy/AdvReac Type Severity Reaction Status Date / Time


 


No Known Allergies Allergy   Verified 09/29/18 09:18














- Medications


Medications: 


                               Current Medications





Acetylcysteine (Acetylcysteine 20%)  4 ml IH Q8 [facility]


Aspirin (Ecotrin)  81 mg PO DAILY [facility]


Atorvastatin Calcium (Lipitor)  40 mg PO DIN [facility]


Carvedilol (Coreg)  12.5 mg PO BID [facility]


Clopidogrel Bisulfate (Plavix)  75 mg PO DAILY [facility]


Famotidine (Pepcid)  40 mg PO HS [facility]


Furosemide (Lasix)  40 mg IVP BID [facility]


Heparin Sodium/Sodium Chloride (Heparin 22658 Units/250ml 1/2 Normal Saline)  

25,000 units in 250 mls @ 9.525 mls/hr IV .Q24H [facility]; Protocol


   Last Admin: 02/12/19 03:28 Dose:  12 units/kg/hr, 9.525 mls/hr


Doxycycline Hyclate 100 mg/ (Sodium Chloride)  100 mls @ 100 mls/hr IVPB Q12 

[facility]; Protocol


Ceftriaxone Sodium (Rocephin 1 Gram Ivpb)  1 gm in 100 mls @ 100 mls/hr IVPB 

DAILY [facility]; Protocol


Insulin Human Regular (Humulin R Med)  0 units SC ACHS [facility]; Protocol


   Last Admin: 02/12/19 08:26 Dose:  Not Given


Levalbuterol HCl (Xopenex)  0.63 mg IH [id number] PRN


   PRN Reason: Shortness of Breath


Levalbuterol HCl (Xopenex)  0.63 mg IH Q8 [facility]


Levothyroxine Sodium (Synthroid)  25 mcg PO 0600 [facility]


   Last Admin: 02/12/19 06:08 Dose:  25 mcg


Potassium Chloride (K-Dur 20 Meq Er Tab)  40 meq PO BID [facility]


   Stop: 02/12/19 18:01


Ramipril (Altace)  10 mg PO DAILY [facility]


Tramadol HCl (Ultram)  50 mg PO Q6 PRN


   PRN Reason: Pain, moderate (4-7)











Physical Exam





- Additional Findings


Additional findings: 





- Constitutional


Appears: Non-toxic, No Acute Distress





- Head Exam


Head Exam: ATRAUMATIC, NORMOCEPHALIC





- Eye Exam


Eye Exam: EOMI, Normal appearance, PERRL





- ENT Exam


ENT Exam: Mucous Membranes Moist





- Neck Exam


Neck exam: Negative for: Lymphadenopathy





- Respiratory Exam


Respiratory Exam: Chest Wall Tenderness, Decreased Breath Sounds (on the left), 

Rales.  absent: Accessory Muscle Use, Rhonchi, Wheezes, Respiratory Distress





- Cardiovascular Exam


Cardiovascular Exam: REGULAR RHYTHM, JVD, +S1, +S2.  absent: Systolic Murmur





- GI/Abdominal Exam


GI & Abdominal Exam: Soft, Tenderness.  absent: Distended, Firm, Guarding, 

Rebound, Rigid


Indirect inguinal hernia, nontender, reduced, nonbulging clinically 





- Extremities Exam


Extremities exam: Positive for: normal capillary refill, pedal pulses present.  

Negative for: tenderness


Additional comments: 





+1 pitting edema on LE b/l





- Neurological Exam


Neurological exam: Alert, CN II-XII Intact, Oriented x3





- Skin


Skin Exam: Dry, Intact





Results





- Vital Signs


Recent Vital Signs: 


                                Last Vital Signs











Temp  98.4 F   02/12/19 06:00


 


Pulse  70   02/12/19 06:00


 


Resp  20   02/12/19 06:00


 


BP  157/93 H  02/12/19 06:00


 


Pulse Ox  95   02/12/19 06:00














- Labs


Result Diagrams: 


                                 02/12/19 09:10





                                 02/12/19 09:10


Labs: 


                         Laboratory Results - last 24 hr











  02/11/19 02/11/19 02/11/19





  20:35 20:41 20:41


 


WBC   5.3 


 


RBC   4.31 


 


Hgb   13.7 L 


 


Hct   41.9 L 


 


MCV   97.2 


 


MCH   31.8 


 


MCHC   32.7 


 


RDW   13.7 


 


Plt Count   175 


 


MPV   9.9 


 


Neut % (Auto)   77.8 H 


 


Lymph % (Auto)   11.2 L 


 


Mono % (Auto)   8.4 H 


 


Eos % (Auto)   2.4 


 


Baso % (Auto)   0.2 


 


Lymph # (Auto)   0.6 L 


 


Mono # (Auto)   0.5 


 


Eos # (Auto)   0.1 


 


Baso # (Auto)   0.01 


 


Absolute Neuts (auto)   4.15 


 


PT    16.9 H


 


INR    1.52


 


APTT    32.2


 


pCO2   


 


pO2  18 L  


 


HCO3   


 


ABG pH   


 


ABG Total CO2   


 


ABG O2 Saturation   


 


ABG Base Excess   


 


ABG Potassium   


 


VBG pH  7.37  


 


VBG pCO2  47.0  


 


VBG HCO3  27.2  


 


VBG Total CO2  28.6 H  


 


VBG O2 Sat (Calc)  35.0 L  


 


VBG Base Excess  1.3  


 


VBG Potassium  3.6  


 


Sodium  140.0  


 


Chloride  105.0  


 


Glucose  227 H  


 


Lactate  3.3 H  


 


FiO2  21.0  


 


Crit Value Called To  Luisito vidal  


 


Crit Value Called By  Rohit  


 


Blood Gas Notified Time  2046  


 


Potassium   


 


Carbon Dioxide   


 


Anion Gap   


 


BUN   


 


Creatinine   


 


Est GFR ( Amer)   


 


Est GFR (Non-Af Amer)   


 


POC Glucose (mg/dL)   


 


Random Glucose   


 


Calcium   


 


Phosphorus   


 


Magnesium   


 


Total Bilirubin   


 


AST   


 


ALT   


 


Alkaline Phosphatase   


 


Troponin I   


 


NT-Pro-B Natriuret Pep   


 


Total Protein   


 


Albumin   


 


Globulin   


 


Albumin/Globulin Ratio   


 


Triglycerides   


 


Cholesterol   


 


LDL Cholesterol Direct   


 


HDL Cholesterol   


 


TSH 3rd Generation   


 


Arterial Blood Potassium   


 


Venous Blood Potassium  3.6  


 


Urine Color   


 


Urine Appearance   


 


Urine pH   


 


Ur Specific Gravity   


 


Urine Protein   


 


Urine Glucose (UA)   


 


Urine Ketones   


 


Urine Blood   


 


Urine Nitrate   


 


Urine Bilirubin   


 


Urine Urobilinogen   


 


Ur Leukocyte Esterase   


 


Urine RBC   


 


Urine WBC   


 


Ur Epithelial Cells   


 


Urine Bacteria   














  02/11/19 02/11/19 02/11/19





  20:41 23:04 23:59


 


WBC   


 


RBC   


 


Hgb   


 


Hct   


 


MCV   


 


MCH   


 


MCHC   


 


RDW   


 


Plt Count   


 


MPV   


 


Neut % (Auto)   


 


Lymph % (Auto)   


 


Mono % (Auto)   


 


Eos % (Auto)   


 


Baso % (Auto)   


 


Lymph # (Auto)   


 


Mono # (Auto)   


 


Eos # (Auto)   


 


Baso # (Auto)   


 


Absolute Neuts (auto)   


 


PT   


 


INR   


 


APTT   


 


pCO2    39


 


pO2    97.0


 


HCO3    25.3


 


ABG pH    7.42


 


ABG Total CO2    26.5


 


ABG O2 Saturation    99.1 H


 


ABG Base Excess    0.8


 


ABG Potassium    2.6 L


 


VBG pH   


 


VBG pCO2   


 


VBG HCO3   


 


VBG Total CO2   


 


VBG O2 Sat (Calc)   


 


VBG Base Excess   


 


VBG Potassium   


 


Sodium  139   142.0


 


Chloride  103   110.0 H


 


Glucose    157 H


 


Lactate    1.0


 


FiO2    28.0


 


Crit Value Called To   


 


Crit Value Called By   


 


Blood Gas Notified Time   


 


Potassium  4.0  


 


Carbon Dioxide  27  


 


Anion Gap  13  


 


BUN  27 H  


 


Creatinine  1.3  


 


Est GFR ( Amer)  > 60  


 


Est GFR (Non-Af Amer)  52  


 


POC Glucose (mg/dL)   


 


Random Glucose  221 H  


 


Calcium  9.3  


 


Phosphorus   


 


Magnesium  2.0  


 


Total Bilirubin  2.0 H  


 


AST  29  


 


ALT  < 6 L  


 


Alkaline Phosphatase  68  


 


Troponin I  0.46 H* D  


 


NT-Pro-B Natriuret Pep  35199 H  


 


Total Protein  6.8  


 


Albumin  3.9  


 


Globulin  3.0  


 


Albumin/Globulin Ratio  1.3  


 


Triglycerides   


 


Cholesterol   


 


LDL Cholesterol Direct   


 


HDL Cholesterol   


 


TSH 3rd Generation   


 


Arterial Blood Potassium    2.6 L


 


Venous Blood Potassium   


 


Urine Color   Yellow 


 


Urine Appearance   Clear 


 


Urine pH   6.0 


 


Ur Specific Gravity   1.020 


 


Urine Protein   Negative 


 


Urine Glucose (UA)   Negative 


 


Urine Ketones   Negative 


 


Urine Blood   Negative 


 


Urine Nitrate   Negative 


 


Urine Bilirubin   Negative 


 


Urine Urobilinogen   1.0 H 


 


Ur Leukocyte Esterase   Trace H 


 


Urine RBC   0 - 2 


 


Urine WBC   0 - 2 


 


Ur Epithelial Cells   0 - 2 


 


Urine Bacteria   None 














  02/12/19 02/12/19 02/12/19





  00:45 00:45 00:45


 


WBC  4.3 L  


 


RBC  4.47  


 


Hgb  14.4  


 


Hct  43.2  


 


MCV  96.6  


 


MCH  32.2  


 


MCHC  33.3  


 


RDW  13.5  


 


Plt Count  148  


 


MPV  10.0  


 


Neut % (Auto)  74.2 H  


 


Lymph % (Auto)  15.8 L  


 


Mono % (Auto)  8.4 H  


 


Eos % (Auto)  1.4 L  


 


Baso % (Auto)  0.2  


 


Lymph # (Auto)  0.7 L  


 


Mono # (Auto)  0.4  


 


Eos # (Auto)  0.1  


 


Baso # (Auto)  0.01  


 


Absolute Neuts (auto)  3.19  


 


PT   


 


INR   


 


APTT   


 


pCO2   


 


pO2    36


 


HCO3   


 


ABG pH   


 


ABG Total CO2   


 


ABG O2 Saturation   


 


ABG Base Excess   


 


ABG Potassium   


 


VBG pH    7.36


 


VBG pCO2    48.0


 


VBG HCO3    27.1


 


VBG Total CO2    28.6 H


 


VBG O2 Sat (Calc)    71.1 H


 


VBG Base Excess    1.0


 


VBG Potassium    3.2 L


 


Sodium   140  141.0


 


Chloride   104  104.0


 


Glucose    163 H


 


Lactate    2.3 H


 


FiO2    21.0


 


Crit Value Called To    Nya jett rn 2rso


 


Crit Value Called By    [facility]


 


Blood Gas Notified Time    137


 


Potassium   3.2 L 


 


Carbon Dioxide   26 


 


Anion Gap   13 


 


BUN   27 H 


 


Creatinine   1.2 


 


Est GFR ( Amer)   > 60 


 


Est GFR (Non-Af Amer)   58 


 


POC Glucose (mg/dL)   


 


Random Glucose   162 H 


 


Calcium   9.1 


 


Phosphorus   3.7 


 


Magnesium   2.0 


 


Total Bilirubin   1.6 H 


 


AST   25 


 


ALT   12 


 


Alkaline Phosphatase   65 


 


Troponin I   0.41 H* 


 


NT-Pro-B Natriuret Pep   


 


Total Protein   6.7 


 


Albumin   3.7 


 


Globulin   3.0 


 


Albumin/Globulin Ratio   1.2 


 


Triglycerides   


 


Cholesterol   


 


LDL Cholesterol Direct   


 


HDL Cholesterol   


 


TSH 3rd Generation   


 


Arterial Blood Potassium   


 


Venous Blood Potassium    3.2 L


 


Urine Color   


 


Urine Appearance   


 


Urine pH   


 


Ur Specific Gravity   


 


Urine Protein   


 


Urine Glucose (UA)   


 


Urine Ketones   


 


Urine Blood   


 


Urine Nitrate   


 


Urine Bilirubin   


 


Urine Urobilinogen   


 


Ur Leukocyte Esterase   


 


Urine RBC   


 


Urine WBC   


 


Ur Epithelial Cells   


 


Urine Bacteria   














  02/12/19 02/12/19 02/12/19





  06:50 06:50 06:50


 


WBC   


 


RBC   


 


Hgb   


 


Hct   


 


MCV   


 


MCH   


 


MCHC   


 


RDW   


 


Plt Count   


 


MPV   


 


Neut % (Auto)   


 


Lymph % (Auto)   


 


Mono % (Auto)   


 


Eos % (Auto)   


 


Baso % (Auto)   


 


Lymph # (Auto)   


 


Mono # (Auto)   


 


Eos # (Auto)   


 


Baso # (Auto)   


 


Absolute Neuts (auto)   


 


PT   


 


INR   


 


APTT   


 


pCO2   


 


pO2    36


 


HCO3   


 


ABG pH   


 


ABG Total CO2   


 


ABG O2 Saturation   


 


ABG Base Excess   


 


ABG Potassium   


 


VBG pH    7.39


 


VBG pCO2    47.0


 


VBG HCO3    28.5 H


 


VBG Total CO2    29.9 H


 


VBG O2 Sat (Calc)    72.5 H


 


VBG Base Excess    2.8 H


 


VBG Potassium    2.9 L


 


Sodium    142.0


 


Chloride    105.0


 


Glucose    130 H


 


Lactate    2.2 H


 


FiO2    21.0


 


Crit Value Called To    Jolene


 


Crit Value Called By    Ab


 


Blood Gas Notified Time    708


 


Potassium   


 


Carbon Dioxide   


 


Anion Gap   


 


BUN   


 


Creatinine   


 


Est GFR ( Amer)   


 


Est GFR (Non-Af Amer)   


 


POC Glucose (mg/dL)   


 


Random Glucose   


 


Calcium   


 


Phosphorus   


 


Magnesium   


 


Total Bilirubin   


 


AST   


 


ALT   


 


Alkaline Phosphatase   


 


Troponin I  0.35 H*  


 


NT-Pro-B Natriuret Pep   


 


Total Protein   


 


Albumin   


 


Globulin   


 


Albumin/Globulin Ratio   


 


Triglycerides  69  


 


Cholesterol  93 L  


 


LDL Cholesterol Direct  49  


 


HDL Cholesterol  33  


 


TSH 3rd Generation   3.04 


 


Arterial Blood Potassium   


 


Venous Blood Potassium    2.9 L


 


Urine Color   


 


Urine Appearance   


 


Urine pH   


 


Ur Specific Gravity   


 


Urine Protein   


 


Urine Glucose (UA)   


 


Urine Ketones   


 


Urine Blood   


 


Urine Nitrate   


 


Urine Bilirubin   


 


Urine Urobilinogen   


 


Ur Leukocyte Esterase   


 


Urine RBC   


 


Urine WBC   


 


Ur Epithelial Cells   


 


Urine Bacteria   














  02/12/19 02/12/19 02/12/19





  07:23 09:10 09:10


 


WBC   4.2 L 


 


RBC   4.63 


 


Hgb   14.6 


 


Hct   45.0 


 


MCV   97.2 


 


MCH   31.5 


 


MCHC   32.4 


 


RDW   13.6 


 


Plt Count   161 


 


MPV   10.4 


 


Neut % (Auto)   65.5 


 


Lymph % (Auto)   24.2 


 


Mono % (Auto)   6.5 H 


 


Eos % (Auto)   3.6 


 


Baso % (Auto)   0.2 


 


Lymph # (Auto)   1.0 L 


 


Mono # (Auto)   0.3 


 


Eos # (Auto)   0.2 


 


Baso # (Auto)   0.01 


 


Absolute Neuts (auto)   2.73 


 


PT   


 


INR   


 


APTT   


 


pCO2   


 


pO2   


 


HCO3   


 


ABG pH   


 


ABG Total CO2   


 


ABG O2 Saturation   


 


ABG Base Excess   


 


ABG Potassium   


 


VBG pH   


 


VBG pCO2   


 


VBG HCO3   


 


VBG Total CO2   


 


VBG O2 Sat (Calc)   


 


VBG Base Excess   


 


VBG Potassium   


 


Sodium    142


 


Chloride    105


 


Glucose   


 


Lactate   


 


FiO2   


 


Crit Value Called To   


 


Crit Value Called By   


 


Blood Gas Notified Time   


 


Potassium    3.2 L


 


Carbon Dioxide    28


 


Anion Gap    13


 


BUN    27 H


 


Creatinine    1.2


 


Est GFR ( Amer)    > 60


 


Est GFR (Non-Af Amer)    58


 


POC Glucose (mg/dL)  118 H  


 


Random Glucose    115 H


 


Calcium    9.2


 


Phosphorus   


 


Magnesium    2.1


 


Total Bilirubin    1.7 H


 


AST    28


 


ALT    17


 


Alkaline Phosphatase    68


 


Troponin I   


 


NT-Pro-B Natriuret Pep   


 


Total Protein    6.9


 


Albumin    3.8


 


Globulin    3.2


 


Albumin/Globulin Ratio    1.2


 


Triglycerides   


 


Cholesterol   


 


LDL Cholesterol Direct   


 


HDL Cholesterol   


 


TSH 3rd Generation   


 


Arterial Blood Potassium   


 


Venous Blood Potassium   


 


Urine Color   


 


Urine Appearance   


 


Urine pH   


 


Ur Specific Gravity   


 


Urine Protein   


 


Urine Glucose (UA)   


 


Urine Ketones   


 


Urine Blood   


 


Urine Nitrate   


 


Urine Bilirubin   


 


Urine Urobilinogen   


 


Ur Leukocyte Esterase   


 


Urine RBC   


 


Urine WBC   


 


Ur Epithelial Cells   


 


Urine Bacteria   














Assessment & Plan





- Assessment and Plan (Free Text)


Assessment: 





85 M with PMHx CAD s/p CABG 1990, asthma, who presented with NSTEMI. Presently, 

doubt small bowel obstruction in light of tolerance of diet.


2/12/19 CT abd/pelvis: There is a right-sided inguinal hernia that contains a l

oop of bowel. This measures 3.4 x 5.2 cm. There is no associated obstruction.





Plan:





For likely constipation, Miralax with food.


Stool softeners - Colace TID, Dulcolax suppository


No evidence of obstruction or bowel incarceration, not a surgical candidate at 

this time


Will continue to follow as needed.





Further recs per Dr. Jer Falk, PGY-1

## 2019-02-12 NOTE — CT
Date of service: 



02/12/2019



PROCEDURE:  CT Abdomen and Pelvis without intravenous contrast



HISTORY:

abd tenderness



COMPARISON:

09/29/2018



TECHNIQUE:

Without contrast.. 



Contrast dose: 



Radiation dose:



Total exam DLP = 492.32 mGy-cm.



This CT exam was performed using one or more of the following dose 

reduction techniques: Automated exposure control, adjustment of the 

mA and/or kV according to patient size, and/or use of iterative 

reconstruction technique.



FINDINGS:



LOWER THORAX:

Moderate bilateral pleural effusions left greater than right.  

Moderate cardiomegaly.  There is coronary artery calcification and 

aortic calcification. 



LIVER:

Unremarkable. No gross lesion or ductal dilatation.  



GALLBLADDER AND BILE DUCTS:

Unremarkable. 



PANCREAS:

Unremarkable. No gross lesion or ductal dilatation.



SPLEEN:

Unremarkable. 



ADRENALS:

Unremarkable. No mass. 



KIDNEYS AND URETERS:

Unremarkable. No hydronephrosis. No solid mass. 



VASCULATURE:

Unremarkable. No aortic aneurysm. 



BOWEL:

Unremarkable. No obstruction. No gross mural thickening. There is a 

right-sided inguinal hernia the contains a loop of bowel.  This 

measures 3.4 x 5.2 cm there is no associated obstruction 



APPENDIX:

Unremarkable. Normal appendix. 



PERITONEUM:

Unremarkable. No free fluid. No free air. 



LYMPH NODES:

Unremarkable. No enlarged lymph nodes. 



BLADDER:

Unremarkable. 



REPRODUCTIVE:

The prostate is enlarged and partially calcified 



BONES:

No acute fracture.  There is calcification within the spinal canal at 

the L5-S1 level. 



OTHER FINDINGS:

The report concurs with the preliminary [facility] report



IMPRESSION:

There is a right-sided inguinal hernia that contains a loop of bowel. 

 This measures 3.4 x 5.2 cm. There is no associated obstruction

## 2019-02-13 NOTE — CP.PCM.PN
Subjective





- Date & Time of Evaluation


Date of Evaluation: 02/13/19


Time of Evaluation: 10:42





- Subjective


Subjective: 





Surgery Progress Note for Dr. Randle's service





S/E at bedside.


Nephew at bedside. Patient had bowel movement as per nephew.


No acute complaints from patient. 


For cardiac cath with primary medical team today.








Objective





- Vital Signs/Intake and Output


Vital Signs (last 24 hours): 


                                        











Temp Pulse Resp BP Pulse Ox


 


 97.8 F   56 L  20   119/69   95 


 


 02/13/19 05:46  02/13/19 09:46  02/13/19 05:46  02/13/19 09:55  02/13/19 05:46








Intake and Output: 


                                        











 02/13/19 02/13/19





 06:59 18:59


 


Intake Total 1014 


 


Output Total 1000 


 


Balance 14 














- Medications


Medications: 


                               Current Medications





Acetaminophen (Tylenol 325mg Tab)  650 mg PO Q6 PRN


   PRN Reason: TEMP>=99.5F


Acetaminophen (Tylenol 650 Mg Supp)  650 mg RC Q6H PRN


   PRN Reason: TEMP>=99.5F


Acetylcysteine (Acetylcysteine 20%)  4 ml IH Q8 [facility]


   Last Admin: 02/13/19 07:15 Dose:  4 ml


Aspirin (Ecotrin)  81 mg PO DAILY [facility]


   Last Admin: 02/13/19 09:54 Dose:  81 mg


Atorvastatin Calcium (Lipitor)  40 mg PO DIN [facility]


   Last Admin: 02/12/19 17:49 Dose:  40 mg


Carvedilol (Coreg)  12.5 mg PO BID [facility]


   Last Admin: 02/13/19 09:46 Dose:  12.5 mg


Clopidogrel Bisulfate (Plavix)  75 mg PO DAILY [facility]


   Last Admin: 02/13/19 09:55 Dose:  75 mg


Dextrose (Dextrose 50% Inj)  0 ml IV STAT PRN; Protocol


   PRN Reason: Hypoglycemia Protocol


Docusate Sodium (Colace)  100 mg PO TID [facility]


   Last Admin: 02/13/19 09:46 Dose:  100 mg


Famotidine (Pepcid)  40 mg PO HS [facility]


   Last Admin: 02/12/19 22:49 Dose:  Not Given


Furosemide (Lasix)  40 mg IVP BID [facility]


   Last Admin: 02/13/19 09:55 Dose:  40 mg


Heparin Sodium/Sodium Chloride (Heparin 78919 Units/250ml 1/2 Normal Saline)  

25,000 units in 250 mls @ 9.525 mls/hr IV .Q24H [facility]; Protocol


   Last Admin: 02/12/19 03:28 Dose:  12 units/kg/hr, 9.525 mls/hr


Doxycycline Hyclate 100 mg/ (Sodium Chloride)  100 mls @ 100 mls/hr IVPB Q12 

[facility]; Protocol


   Last Admin: 02/13/19 09:56 Dose:  100 mls/hr


Ceftriaxone Sodium (Rocephin 1 Gram Ivpb)  1 gm in 100 mls @ 100 mls/hr IVPB 

DAILY [facility]; Protocol


   Last Admin: 02/13/19 09:55 Dose:  100 mls/hr


Dextrose (Dextrose 5% In Water 1000 Ml)  1,000 mls @ 0 mls/hr IV .Q0M PRN; 

Protocol


   PRN Reason: Hypoglycemia Protocol


Metronidazole (Flagyl)  500 mg in 100 mls @ 100 mls/hr IVPB Q8 [facility]; Protocol


   Last Admin: 02/13/19 05:35 Dose:  100 mls/hr


Insulin Human Regular (Humulin R Med)  0 units SC ACHS [facility]; Protocol


   Last Admin: 02/13/19 09:54 Dose:  Not Given


Levalbuterol HCl (Xopenex)  0.63 mg IH [id number] PRN


   PRN Reason: Shortness of Breath


Levalbuterol HCl (Xopenex)  0.63 mg IH Q8 [facility]


   Last Admin: 02/13/19 07:16 Dose:  0.63 mg


Levothyroxine Sodium (Synthroid)  25 mcg PO 0600 [facility]


   Last Admin: 02/13/19 05:36 Dose:  25 mcg


Ondansetron HCl (Zofran Inj)  4 mg IVP Q4H PRN


   PRN Reason: Nausea/Vomiting


Ramipril (Altace)  10 mg PO DAILY [facility]


   Last Admin: 02/13/19 09:46 Dose:  10 mg


Tramadol HCl (Ultram)  50 mg PO Q6 PRN


   PRN Reason: Pain, moderate (4-7)











- Labs


Labs: 


                                        





                                 02/13/19 08:15 





                                 02/13/19 08:15 





                                        











PT  16.9 SECONDS (9.4-12.5)  H  02/11/19  20:41    


 


INR  1.52   02/11/19  20:41    


 


APTT  79.7 Seconds (26.9-38.3)  H  02/12/19  16:22    














- Additional Findings


Additional findings: 





- Constitutional


Appears: Non-toxic, No Acute Distress





- Head Exam


Head Exam: ATRAUMATIC, NORMOCEPHALIC





- Eye Exam


Eye Exam: EOMI, Normal appearance, PERRL





- ENT Exam


ENT Exam: Mucous Membranes Moist





- Neck Exam


Neck exam: Negative for: Lymphadenopathy





- Respiratory Exam


Respiratory Exam: Decreased Breath Sounds (on the left), Rales.  absent: 

Accessory Muscle Use, Rhonchi, Wheezes, Respiratory Distress





- Cardiovascular Exam


Cardiovascular Exam: REGULAR RHYTHM, +S1, +S2.  absent: Systolic Murmur





- GI/Abdominal Exam


GI & Abdominal Exam: Soft, Tenderness.  absent: Distended, Firm, Guarding, 

Rebound, Rigid


Indirect inguinal hernia, nontender, reduced, nonbulging clinically 





- Extremities Exam


Extremities exam: Positive for: normal capillary refill, pedal pulses present.  

Negative for: tenderness


Additional comments: 





+1 pitting edema on LE b/l





- Neurological Exam


Neurological exam: Alert, CN II-XII Intact, Oriented x3





- Skin


Skin Exam: Dry, Intact, noted midline incision on chest wall





Assessment and Plan





- Assessment and Plan (Free Text)


Assessment: 





85 M with PMHx CAD s/p CABG 1990, asthma, who presented with NSTEMI. Presently, 

doubt small bowel obstruction in light of tolerance of diet.


2/12/19 CT abd/pelvis: There is a right-sided inguinal hernia that contains a 

loop of bowel. This measures 3.4 x 5.2 cm. There is no associated obstruction.





Plan:





Vomiting likely related to NSTEMI


No episodes since yesterday at 8 AM; patient tolerateing oral intake


No evidence of obstruction or bowel incarceration, not a surgical candidate at 

this time


likely constipated, had bowel movement today as per nephew s/p colace tid, 

miralax and dulcolax suppository


Will continue to follow as needed.





Further recs per Dr. Jer Luna PGY-1

## 2019-02-13 NOTE — CP.PCM.PCO
Physician Communication Note





- Physician Communication Note


Physician Communication Note: decreased EF milrinone initiated as per cardiology

## 2019-02-13 NOTE — PN
DATE:  02/13/2019



SUBJECTIVE:  The patient is seen in the cath lab holding area.  The patient

is sitting up in the stretcher.  The patient does not appear to be in any

distress.  The patient is alert, awake, responsive.  The patient denies any

chest pain or shortness of breath today.  Overnight, nurse's notes were

reviewed.  The patient slept well.



PHYSICAL EXAMINATION:

VITAL SIGNS:  T-max 97.8.  Telemetry shows sinus rhythm, sinus bradycardia,

PACs, PVCs.  Blood pressure in the last 24 hours 118/69, 117/58, 119/69,

107/64, 128/57.  Respiration is 28.  O2 sat is 95% to 97%.

HEAD:  Normocephalic, atraumatic.

HEENT:  Shows pinkish conjunctivae.  Anicteric sclerae.  No oropharyngeal

lesion.

NECK:  No neck rigidity.

CHEST:  Kyphosis.

LUNGS:  Show decreased breath sounds, left more than right.  Positive

rhonchi, left more than the right.

CARDIOVASCULAR:  S1 and S2.  Regular rhythm.  Questionable soft systolic

murmur in left sternal border, right second intercostal space, left second

intercostal space.

ABDOMEN:  Soft.  Positive bowel sounds.

GENITALIA:  Male.

RECTAL:  Deferred.

EXTREMITIES:  Show almost complete resolution of the pitting edema and the

swelling.

MUSCULOSKELETAL:  Shows a body mass index of 25.1.

CENTRAL NERVOUS SYSTEM:  Cranial nerves II through XII limited.  Gait

examination is not tested.



DIAGNOSTIC DATA:  On 02/13/2019, WBC of 4.6, hemoglobin and hematocrit of

13.3 and 41.2, and platelet 156.  Sodium 141, potassium 3.8, chloride 106,

CO2 of 28, anion gap 10, BUN has been 26 to 27, creatinine 1.2, GFR greater

than 60, glucose 134, fructosamine 290, and hemoglobin A1c is 7.1.  LFTs

are normal.  ProBNP is down from 13,700 to 5050.  Peak troponin is 0.46

down to 0.33.  Vitamin D 25-hydroxy is 28.  TSH is 3.04.  Lipid panel is

within normal limit.  HIV, influenza, and Legionella serologies are all

negative.  The patient underwent a cardiac catheterization.



IMPRESSION AND PLAN:

1.  Acute non-ST elevation myocardial infarction with elevated troponin.

2.  Dilated ischemic cardiomyopathy with left ventricular ejection fraction

of 25% to 29%.

3.  Left internal mammary artery to left anterior descending artery patent.

4.  Left circumflex occluded.

5.  A 99% stenosis of the right coronary artery.

6.  Bypass graft to right coronary artery is occluded.

7.  Status post angioplasty and bare-metal stent placement of the right

coronary artery.

8.  Hypertension.

9.  Episodic sinus bradycardia.

10.  Transient hypoxemia.

11.  Leukopenia and anemia.

12.  Transient granulocytosis.

13.  Lactic acidosis (resolved).

14.  Prerenal kidney injury.

15.  Uncontrolled non-insulin requiring diabetes mellitus with hemoglobin

A1c of 7.1 and fructosamine of 290.

16.  Hyperbilirubinemia.

17.  Hypovitaminosis D.

18.  Hypokalemia.

19.  Hyperglycemia.

20.  Microscopic hematuria, pyuria, bacteriuria.

21.  Left upper lobe and left lower lobe pneumonia and consolidation.

22.  Cardiomegaly.

23.  Coronary artery calcification and aortic calcification.

24.  Moderate left pleural effusion and small right pleural effusion.

25.  Degenerative joint disease and ossification of the anterior

longitudinal ligament.

26.  History of coronary artery disease and coronary artery bypass graft.

27.  Gait dysfunction.

28.  Deconditioning.

29.  History of hypothyroidism.

30.  Bilateral moderate pleural effusion.

31.  Bibasilar airspace atelectasis.

32.  Hiatal hernia.

33.  Prostatomegaly with prostatic calcification.

34.  Urinary bladder wall thickening.

35.  Non-incarcerated cecum in the right inguinal hernia.

36.  Colonic ileus.

37.  Cholelithiasis.

38.  Diffuse spondylosis.

39.  Cerebral and cerebellar cortical atrophy and ventriculomegaly.

40.  Chronic microvascular ischemic disease of the brain.

41.  Multilobar healthcare-associated versus community-acquired pneumonia.

42.  Right-sided inguinal hernia with bowel loop.

43.  L5-S1 spinal canal calcification.

44.  Left ventricular hypertrophy.

45.  Lateral wall ischemic changes.

46.  Pulmonary hypertension with tricuspid regurgitation and right

ventricular systolic pressure of 70 mmHg.

47.  Unstable angina, acute non-ST elevation myocardial infarction with

elevated troponin and acute coronary syndrome.

48.  Questionable aspiration pneumonia.

49.  Constipation.

50.  Questionable oropharyngeal dysphagia.

51.  Questionable early sacral decubitus ulceration with erythema.

52.  Episodic disorientation.



Plan at this time, the patient has been ordered repeat labs for the

morning.  The patient's blood and urine cultures are negative.  Current

consultation, Cardiology.  Next referral to diabetic education and TCU

evaluation ordered.  Mucomyst 20% 4 mL every 8 hours, Altace 10 mg daily,

Colace 100 mg three times a day, Coreg 12.5 mg twice a day, doxycycline 100

mg IV every 12 hours, Ecotrin 81 mg daily, Flagyl 500 mg IV every 8 hours,

regular insulin medium-dose sliding scale coverage, Lasix decreased to 40

mg IV daily, Lipitor 40 mg daily, MiraLax 17 g was given once, Pepcid 40 mg

at bedtime, Plavix 75 mg daily, IV milrinone drip at 0.375 mcg/kg per

minute, Rocephin 1 g IV daily, Synthroid 25 mcg p.o. daily, Tylenol p.o. or

suppository every 6 hours p.r.n., Ultram 50 mg every 6 hours p.r.n.,

Xopenex 0.63 mg every 6 hours p.r.n. and every 8 hours round-the-clock,

Zofran 4 mg IV every 4 hours p.r.n.  The patient is being on chest PT,

incentive spirometer, oxygen 2 L.  Repeat EKG ordered for the morning. 

Liquid diet ordered.  At this time, the patient has been ordered repeat

chest x-ray.  Bedrest.  Post cardiac catheterization.  Occupational therapy

and physical therapy will start tomorrow after the patient has been cleared

by Cardiology post cardiac catheterization.



Dictated and electronically signed, not read.







__________________________________________

Сергей Harris MD





DD:  02/13/2019 18:32:04

DT:  02/13/2019 18:40:40

Job # [id number]

## 2019-02-13 NOTE — CARDCATH
PROCEDURE DATE:  02/13/2019



CARDIAC CATHETERIZATION



HISTORY:  The patient is an 85-year-old male who presents with progressive

angina and lethargy.  According to the patient's nephew, the patient had

coronary bypass surgery remotely in which nobody is clear when that was

done.  He has not been followed by a cardiologist, the patient is from

[place].



Because of this, as well as his progressive anginal symptoms as well as

elevated troponins consistent with a non-STEMI, a cardiac catheterization

was recommended.



PROCEDURE:  Left heart catheterization with coronary arteriography, left

ventriculogram, supra-aortic valvular injection, left internal mammary

artery angiogram, saphenous vein graft angiogram followed by percutaneous

transluminal coronary angioplasty and stent of a diffusely diseased right

coronary artery was performed.



I performed moderate sedation, which included the presence of an

independent trained observer that assisted in monitoring the patient's

level of consciousness and physiologic status.  After administration of

Versed and fentanyl, my intra service time was 45 minutes.



The right femoral artery was cannulated with 6-[place] sheath.  There were

no complications.



The findings on catheterization revealed a left ventricle that was

diffusely dilated and diffusely hypokinetic with an estimated ejection

fraction of 25%.



The findings on catheterization revealed a right dominant circulation.



The RCA revealed diffuse 70% to 80% stenosis in the proximal portion of the

RCA extending into the midportion.  In addition, there was a discrete area

of 99% stenosis in the mid RCA.  The PDA was diffusely diseased with a long

critical 80% to 90% stenosis.



The left main artery revealed intimal irregularities without critical

lesions.



The LAD was occluded in its proximal portion.



The circumflex artery was subtotally occluded in its proximal portion.



The LIMA to the LAD was found to be patent and provided good antegrade flow

to the mid and distal LAD.  In the midportion of the LAD after the

anastomotic site, there was diffuse atherosclerosis with a long 50% to 60%

stenosis noted.



The saphenous vein graft to the RCA was occluded.



No other saphenous vein grafts could be found on supra-aortic valvular

injection.



The patient was started on intravenous Angiomax on the fluoroscopic guide,

the guiding catheter was placed in the ostium of the RCA.  An 0.014 ATW

wire was used to cross all the critical lesions.



A 2.5 balloon was utilized to predilate the discrete 90% stenosis as well

as the proximal RCA and mid lesions.



This was followed by implantation of three bare-metal stents in the mid RCA

and proximal RCA.



The last stent with a 4.0 stent that covered the proximal portion of the

RCA.



Repeat coronary arteriography revealed an excellent result with no residual

stenosis and DANIEL-3 flow.



Angio-Seal was used to close the femoral artery site.  The patient

tolerated the procedure well.



In summary, the procedure was successful PTCA and stent of multiple lesions

in the RCA using bare-metal stents.



Cardiac catheterization revealed severe triple-vessel CAD.



Patent LIMA to the LAD _____ including disease in the LAD distal to the

LIMA.



Occluded SVG to the RCA.



An occluded circumflex artery.



As well as an abnormal LV with an EF of 25%.



Given these findings, the patient will go back to the recovery area on

aspirin and Plavix.  We will start the patient on intravenous Primacor to

help his low cardiac output symptoms as well as CHF symptoms.







__________________________________________

Onel Ann MD





DD:  02/13/2019 14:43:31

DT:  02/13/2019 14:47:03

Job # [id number]

## 2019-02-13 NOTE — CP.PCM.PN
Subjective





- Date & Time of Evaluation


Date of Evaluation: 02/13/19


Time of Evaluation: 08:54





- Subjective


Subjective: 





PGY-3 for Dr Kimberly CONNOR IV site ozzing. RN reported a drop of dark-red urine on diaper


No dizziness, palpitation, CP, SOB, nausea, vomiting. No acute complaint per 12 

pt ROS. (+) BM





Objective





- Vital Signs/Intake and Output


Vital Signs (last 24 hours): 


                                        











Temp Pulse Resp BP Pulse Ox


 


 97.8 F   61   20   117/58 L  95 


 


 02/13/19 05:46  02/13/19 05:46  02/13/19 05:46  02/13/19 05:46  02/13/19 05:46








Intake and Output: 


                                        











 02/13/19 02/13/19





 06:59 18:59


 


Intake Total 1014 


 


Output Total 1000 


 


Balance 14 














- Medications


Medications: 


                               Current Medications





Acetaminophen (Tylenol 325mg Tab)  650 mg PO Q6 PRN


   PRN Reason: TEMP>=99.5F


Acetaminophen (Tylenol 650 Mg Supp)  650 mg RC Q6H PRN


   PRN Reason: TEMP>=99.5F


Acetylcysteine (Acetylcysteine 20%)  4 ml IH Q8 [facility]


   Last Admin: 02/13/19 07:15 Dose:  4 ml


Aspirin (Ecotrin)  81 mg PO DAILY [facility]


   Last Admin: 02/12/19 11:23 Dose:  81 mg


Atorvastatin Calcium (Lipitor)  40 mg PO DIN [facility]


   Last Admin: 02/12/19 17:49 Dose:  40 mg


Carvedilol (Coreg)  12.5 mg PO BID [facility]


   Last Admin: 02/12/19 17:48 Dose:  12.5 mg


Clopidogrel Bisulfate (Plavix)  75 mg PO DAILY [facility]


   Last Admin: 02/12/19 12:25 Dose:  Not Given


Dextrose (Dextrose 50% Inj)  0 ml IV STAT PRN; Protocol


   PRN Reason: Hypoglycemia Protocol


Docusate Sodium (Colace)  100 mg PO TID [facility]


   Last Admin: 02/12/19 19:03 Dose:  100 mg


Famotidine (Pepcid)  40 mg PO HS [facility]


   Last Admin: 02/12/19 22:49 Dose:  Not Given


Furosemide (Lasix)  40 mg IVP BID [facility]


   Last Admin: 02/12/19 17:47 Dose:  40 mg


Heparin Sodium/Sodium Chloride (Heparin 82490 Units/250ml 1/2 Normal Saline)  

25,000 units in 250 mls @ 9.525 mls/hr IV .Q24H [facility]; Protocol


   Last Admin: 02/12/19 03:28 Dose:  12 units/kg/hr, 9.525 mls/hr


Doxycycline Hyclate 100 mg/ (Sodium Chloride)  100 mls @ 100 mls/hr IVPB Q12 

[facility]; Protocol


   Last Admin: 02/12/19 22:48 Dose:  100 mls/hr


Ceftriaxone Sodium (Rocephin 1 Gram Ivpb)  1 gm in 100 mls @ 100 mls/hr IVPB 

DAILY [facility]; Protocol


   Last Admin: 02/12/19 12:22 Dose:  100 mls/hr


Dextrose (Dextrose 5% In Water 1000 Ml)  1,000 mls @ 0 mls/hr IV .Q0M PRN; 

Protocol


   PRN Reason: Hypoglycemia Protocol


Metronidazole (Flagyl)  500 mg in 100 mls @ 100 mls/hr IVPB Q8 [facility]; Protocol


   Last Admin: 02/13/19 05:35 Dose:  100 mls/hr


Insulin Human Regular (Humulin R Med)  0 units SC ACHS [facility]; Protocol


   Last Admin: 02/12/19 21:40 Dose:  Not Given


Levalbuterol HCl (Xopenex)  0.63 mg IH [id number] PRN


   PRN Reason: Shortness of Breath


Levalbuterol HCl (Xopenex)  0.63 mg IH Q8 [facility]


   Last Admin: 02/13/19 07:16 Dose:  0.63 mg


Levothyroxine Sodium (Synthroid)  25 mcg PO 0600 [facility]


   Last Admin: 02/13/19 05:36 Dose:  25 mcg


Ondansetron HCl (Zofran Inj)  4 mg IVP Q4H PRN


   PRN Reason: Nausea/Vomiting


Ramipril (Altace)  10 mg PO DAILY [facility]


   Last Admin: 02/12/19 11:24 Dose:  10 mg


Tramadol HCl (Ultram)  50 mg PO Q6 PRN


   PRN Reason: Pain, moderate (4-7)











- Labs


Labs: 


                                        





                                 02/13/19 08:15 





                                 02/13/19 08:15 





                                        











PT  16.9 SECONDS (9.4-12.5)  H  02/11/19  20:41    


 


INR  1.52   02/11/19  20:41    


 


APTT  79.7 Seconds (26.9-38.3)  H  02/12/19  16:22    














- Constitutional


Appears: No Acute Distress





- Head Exam


Head Exam: ATRAUMATIC, NORMAL INSPECTION, NORMOCEPHALIC





- Eye Exam


Eye Exam: EOMI, Normal appearance, PERRL.  absent: Scleral icterus


Pupil Exam: NORMAL ACCOMODATION





- ENT Exam


ENT Exam: Mucous Membranes Moist





- Neck Exam


Additional comments: 





supple





- Respiratory Exam


Respiratory Exam: Decreased Breath Sounds (ADILSON), Rhonchi, NORMAL BREATHING PA

TTERN.  absent: Rales, Wheezes





- Cardiovascular Exam


Cardiovascular Exam: REGULAR RHYTHM, +S1, +S2





- GI/Abdominal Exam


GI & Abdominal Exam: Soft, Normal Bowel Sounds.  absent: Guarding, Rigid, 

Tenderness


Additional comments: 





(+) suprapubic tenderness





- Extremities Exam


Extremities Exam: Normal Capillary Refill.  absent: Calf Tenderness, Pedal Edema





- Back Exam


Back Exam: absent: CVA tenderness (L), CVA tenderness (R)





- Neurological Exam


Neurological Exam: Alert, Awake, Oriented x3





- Psychiatric Exam


Psychiatric exam: Normal Affect, Normal Mood





- Skin


Skin Exam: Normal Color





Assessment and Plan





- Assessment and Plan (Free Text)


Plan: 





Mr Galindo, 85M, with PMHx CAD s/p CABG in 1990 on ASA/Plavix, HTN/HLD, DM2 (A1C 

7.1), asthma c/o Chest pain onset at rest associated with nausea, vomiting, 

diaphoresis. Last bowel movement 6 days ago. He was found to have NSTEMI. Cath 

today revealed EF 20-25. Triple vessel disease (1) SMYTH to LAD patent. (2) L Cir

closed (3) RCA 99% block (4) bypass graft to RCA close. He receive PCI with 3 

bare metal stent in RCA and started milrinone gtt.





NSTEMI, Unstable angina


Chest Pain - resolved


Triple vessel disease s/p CABG, s/p PCI with  3 bare metal stents


- ASA/Plavix/lipitor/coreg/ramipril. zofran prn


- Milrinone gtt


[  ] If long term milrinone, need access





CHF acute on chronic


Pleural effusion b/l (L > R)


Hx CABG


- lasix 40 iv bid. 


[  ] Will plan to decrease lasix to 40 daily pending CXR and BNP


[  ] CXR to eval pleural effusion





Aspiration pnemonitis vs CAP


Hx Asthma


- Procalc is low 0.06


- Rocephin, doxy, flagyl (day 1)


- Mucomyst/Xopenex [facility]





Had ruled out UTI - Antibiotics covers possible UTI; urine cx negative





Constipation - Colace TID, Dulcolax suppository. Surgery had ruled out 

obstruction or bowel incarceration. no plan for surgery





Dysphagia - [ ] swallow study/barium swallow. Aspiration precaution. pepsid





Anemia - stable. work up outpatient


DM, A1C 7.1 - ISSS


hypothyrodism - synthroid 


Pain - tramadol PRN





Imaging:


CT abd/pelvis (2/12): There is a right-sided inguinal hernia that contains a 

loop of bowel. This measures 3.4 x 5.2 cm. There is no associated obstruction.


CT chest (2/12): ADILSON infiltrate, patchy





POA: Mr Galarza Dominick Degroot [phone]


Dispo: [  ] PT eval





s/r/d/w Dr Harris

## 2019-02-14 NOTE — CP.PCM.PN
Subjective





- Date & Time of Evaluation


Date of Evaluation: 02/14/19


Time of Evaluation: 10:13





- Subjective


Subjective: 





Surgery Progress Note for Dr. Randle





S/E at bedside. No acute complaints as per patient. As per nursing and primary 

team patient started to have bright red blood per rectum in diaper. Most recent 

diaper had notice blood clots and moderate amount of blood in diaper. Denies 

fevers, chills, abd pain, palpitations, n/v, constipation and dysuria. 





Objective





- Vital Signs/Intake and Output


Vital Signs (last 24 hours): 


                                        











Temp Pulse Resp BP Pulse Ox


 


 97.9 F   68   18   137/85   98 


 


 02/14/19 09:00  02/14/19 09:00  02/14/19 09:00  02/14/19 09:00  02/14/19 05:40








Intake and Output: 


                                        











 02/14/19 02/14/19





 06:59 18:59


 


Intake Total 1506 


 


Balance 1506 














- Medications


Medications: 


                               Current Medications





Acetaminophen (Tylenol 325mg Tab)  650 mg PO Q6 PRN


   PRN Reason: TEMP>=99.5F


Acetaminophen (Tylenol 650 Mg Supp)  650 mg RC Q6H PRN


   PRN Reason: TEMP>=99.5F


Acetylcysteine (Acetylcysteine 20%)  4 ml IH Q8 [facility]


   Last Admin: 02/14/19 08:21 Dose:  4 ml


Atorvastatin Calcium (Lipitor)  40 mg PO DIN [facility]


   Last Admin: 02/13/19 16:15 Dose:  Not Given


Carvedilol (Coreg)  12.5 mg PO BID [facility]


   Last Admin: 02/13/19 17:35 Dose:  Not Given


Dextrose (Dextrose 50% Inj)  0 ml IV STAT PRN; Protocol


   PRN Reason: Hypoglycemia Protocol


Docusate Sodium (Colace)  100 mg PO TID [facility]


   Last Admin: 02/13/19 17:35 Dose:  Not Given


Famotidine (Pepcid)  40 mg PO HS [facility]


   Last Admin: 02/14/19 00:46 Dose:  40 mg


Furosemide (Lasix)  40 mg IVP DAILY [facility]


Doxycycline Hyclate 100 mg/ (Sodium Chloride)  100 mls @ 100 mls/hr IVPB Q12 

[facility]; Protocol


   Last Admin: 02/14/19 00:45 Dose:  100 mls/hr


Ceftriaxone Sodium (Rocephin 1 Gram Ivpb)  1 gm in 100 mls @ 100 mls/hr IVPB 

DAILY [facility]; Protocol


   Last Admin: 02/13/19 20:25 Dose:  100 mls/hr


Dextrose (Dextrose 5% In Water 1000 Ml)  1,000 mls @ 0 mls/hr IV .Q0M PRN; 

Protocol


   PRN Reason: Hypoglycemia Protocol


Metronidazole (Flagyl)  500 mg in 100 mls @ 100 mls/hr IVPB Q8 [facility]; Protocol


   Last Admin: 02/14/19 05:13 Dose:  100 mls/hr


Milrinone Lactate/Dextrose (Primacor 20mg/100ml D5w)  100 mls @ 8.93 mls/hr IV 

[id number] PRN; Protocol


   PRN Reason: TITRATE PER MD ORDER


   Last Admin: 02/14/19 03:12 Dose:  0.375 mcg/kg/min, 8.93 mls/hr


Potassium Chloride (Potassium Chloride 10 Meq/100 Ml)  10 meq in 100 mls @ 50 

mls/hr IVPB Q2H [facility]


   Stop: 02/14/19 12:14


Magnesium Sulfate (Magnesium Sulfate 2 Gm/50 Ml Water)  2 gm in 50 mls @ 50 

mls/hr IVPB ONCE ONE


   Stop: 02/14/19 10:59


Potassium Chloride (Potassium Chloride 20 Meq/100 Ml)  20 meq in 100 mls @ 50 

mls/hr IVPB ONCE ONE


   Stop: 02/14/19 12:59


Insulin Human Regular (Humulin R Med)  0 units SC ACHS [facility]; Protocol


   Last Admin: 02/14/19 07:30 Dose:  Not Given


Levalbuterol HCl (Xopenex)  0.63 mg IH [id number] PRN


   PRN Reason: Shortness of Breath


Levalbuterol HCl (Xopenex)  0.63 mg IH Q8 [facility]


   Last Admin: 02/14/19 08:21 Dose:  0.63 mg


Levothyroxine Sodium (Synthroid)  25 mcg PO 0600 [facility]


   Last Admin: 02/14/19 05:13 Dose:  25 mcg


Magnesium Citrate (Citrate Of Mag)  300 ml PO ONCE ONE


   Stop: 02/14/19 16:01


Magnesium Oxide (Mag-Ox)  400 mg PO BID [facility]


Ondansetron HCl (Zofran Inj)  4 mg IVP Q4H PRN


   PRN Reason: Nausea/Vomiting


Pantoprazole Sodium (Protonix Inj)  40 mg IVP Q12 [facility]


Potassium Chloride (Klor-Con 10)  10 meq PO BRK [facility]


Ramipril (Altace)  10 mg PO DAILY [facility]


   Last Admin: 02/13/19 09:46 Dose:  10 mg


Tramadol HCl (Ultram)  50 mg PO Q6 PRN


   PRN Reason: Pain, moderate (4-7)











- Labs


Labs: 


                                        





                                 02/14/19 09:25 





                                 02/14/19 07:25 





                                        











PT  17.2 SECONDS (9.4-12.5)  H  02/13/19  21:45    


 


INR  1.55   02/13/19  21:45    


 


APTT  37.8 Seconds (26.9-38.3)   02/13/19  21:45    














- Additional Findings


Additional findings: 





- Constitutional


Appears: Non-toxic, No Acute Distress





- Head Exam


Head Exam: ATRAUMATIC, NORMOCEPHALIC





- Eye Exam


Eye Exam: EOMI, Normal appearance, PERRL





- ENT Exam


ENT Exam: Mucous Membranes Moist





- Neck Exam


Neck exam: Negative for: Lymphadenopathy





- Respiratory Exam


Respiratory Exam: Decreased Breath Sounds (on the left), Rales.  absent: 

Accessory Muscle Use, Rhonchi, Wheezes, Respiratory Distress





- Cardiovascular Exam


Cardiovascular Exam: REGULAR RHYTHM, +S1, +S2.  absent: Systolic Murmur





- GI/Abdominal Exam


GI & Abdominal Exam: Soft, Tenderness.  absent: Distended, Firm, Guarding, 

Rebound, Rigid


Indirect inguinal hernia, nontender, reduced, nonbulging clinically 





- Extremities Exam


Extremities exam: Positive for: normal capillary refill, pedal pulses present.  

Negative for: tenderness


Additional comments: 





+1 pitting edema on LE b/l





- Neurological Exam


Neurological exam: Alert, CN II-XII Intact, Oriented x3





- Skin


Skin Exam: Dry, Intact, noted midline incision on chest wall





Assessment and Plan





- Assessment and Plan (Free Text)


Assessment: 





85 M with PMHx CAD s/p CABG 1990, asthma, who presented with NSTEMI. Presently, 

doubt small bowel obstruction in light of tolerance of diet.


2/12/19 CT abd/pelvis: There is a right-sided inguinal hernia that contains a 

loop of bowel. This measures 3.4 x 5.2 cm. There is no associated obstruction. 

Re-evaluated patient for GI bleed. Transfer to ICU as per primary team.





PLAN:





Agree with Transfer to ICU


Colonsocopy planned for 2/15 per GI team


Patient VSS stable, no acute surgical intervention warranted currently


On recent labs hgb stable; s/p cardiac cath on 2/13 with insertion of 3 bare 

metal stents (refer to EMR for full report)


Continue IV protonix


All other medical management as per ICU and primary team





All further recs as per Dr. Randle





PGY-1 Nabila Luna

## 2019-02-14 NOTE — CP.PCM.CON
<Joe Yin - Last Filed: 02/14/19 10:21>





History of Present Illness





- History of Present Illness


History of Present Illness: 





ICU Consult Note for Dr. Vital





Reason for Consultation: GI Bleed





Patient is an 84 yo M with PMH of HTN, CAD s/p CABG, T2DM, HLD, and asthma 

presented to [facility] [place] originally with CP and dyspnea on exertion. Patient was found 

to have an NSTEMI and underwent cardiac catherization which revealed severe 

triple vessel diseas with placement of three bare-metal stent placed in the mid-

and proximal RCA. Patient's EF during catherization of 20-25%. Patient was 

started on dual antiplatelet therapy and milrinone after catherization. ICU was 

consulted due to rectal bleeding noted by nurse this morning. Currently, the 

patient is awake, alert and oriented x3. Patient is hemodynamically stable and 

denies CP, SOB, n/v/d, abdominal pain, fever, chills, HA, fatigue, or dizziness.





PMH: HTN, CAD, T2DM, hyperlipidemia, asthma


PSH: CABG (1990)


SHx: denies alcohol, tobacco, illicit drug use


FHx: denies


Allergies: NKDA 


PMD: none








Review of Systems





- Review of Systems


All systems: reviewed and no additional remarkable complaints except (12 point 

ROS reviewed and is negative other than what is stated in HPI.)





Past Patient History





- Infectious Disease


Hx of Infectious Diseases: None





- Past Social History


Smoking Status: Never Smoked





- CARDIAC


Hx Cardiac Disorders: Yes


Hx Heart Attack: Yes


Hx Hypertension: Yes





- PULMONARY


Hx Respiratory Disorders: No





- NEUROLOGICAL


Hx Neurological Disorder: No





- HEENT


Hx HEENT Problems: Yes


Hx Cataracts: Yes





- RENAL


Hx Chronic Kidney Disease: No





- ENDOCRINE/METABOLIC


Hx Endocrine Disorders: Yes


Hx Diabetes Mellitus Type 2: Yes


Hx Hypothyroidism: Yes





- HEMATOLOGICAL/ONCOLOGICAL


Hx Blood Disorders: No





- INTEGUMENTARY


Hx Dermatological Problems: No





- MUSCULOSKELETAL/RHEUMATOLOGICAL


Hx Musculoskeletal Disorders: No





- GASTROINTESTINAL


Hx Gastrointestinal Disorders: No





- GENITOURINARY/GYNECOLOGICAL


Hx Genitourinary Disorders: No





- PSYCHIATRIC


Hx Psychophysiologic Disorder: No


Hx Substance Use: No





- SURGICAL HISTORY


Hx Open Heart Surgery: Yes





Meds


Allergies/Adverse Reactions: 


                                    Allergies











Allergy/AdvReac Type Severity Reaction Status Date / Time


 


No Known Allergies Allergy   Verified 09/29/18 09:18














- Medications


Medications: 


                               Current Medications





Acetaminophen (Tylenol 325mg Tab)  650 mg PO Q6 PRN


   PRN Reason: TEMP>=99.5F


Acetaminophen (Tylenol 650 Mg Supp)  650 mg RC Q6H PRN


   PRN Reason: TEMP>=99.5F


Acetylcysteine (Acetylcysteine 20%)  4 ml IH Q8 [facility]


   Last Admin: 02/14/19 08:21 Dose:  4 ml


Atorvastatin Calcium (Lipitor)  40 mg PO DIN [facility]


   Last Admin: 02/13/19 16:15 Dose:  Not Given


Carvedilol (Coreg)  12.5 mg PO BID [facility]


   Last Admin: 02/13/19 17:35 Dose:  Not Given


Dextrose (Dextrose 50% Inj)  0 ml IV STAT PRN; Protocol


   PRN Reason: Hypoglycemia Protocol


Docusate Sodium (Colace)  100 mg PO TID [facility]


   Last Admin: 02/13/19 17:35 Dose:  Not Given


Famotidine (Pepcid)  40 mg PO HS [facility]


   Last Admin: 02/14/19 00:46 Dose:  40 mg


Furosemide (Lasix)  40 mg IVP DAILY [facility]


Doxycycline Hyclate 100 mg/ (Sodium Chloride)  100 mls @ 100 mls/hr IVPB Q12 

[facility]; Protocol


   Last Admin: 02/14/19 00:45 Dose:  100 mls/hr


Ceftriaxone Sodium (Rocephin 1 Gram Ivpb)  1 gm in 100 mls @ 100 mls/hr IVPB 

DAILY [facility]; Protocol


   Last Admin: 02/13/19 20:25 Dose:  100 mls/hr


Dextrose (Dextrose 5% In Water 1000 Ml)  1,000 mls @ 0 mls/hr IV .Q0M PRN; 

Protocol


   PRN Reason: Hypoglycemia Protocol


Metronidazole (Flagyl)  500 mg in 100 mls @ 100 mls/hr IVPB Q8 [facility]; Protocol


   Last Admin: 02/14/19 05:13 Dose:  100 mls/hr


Milrinone Lactate/Dextrose (Primacor 20mg/100ml D5w)  100 mls @ 8.93 mls/hr IV 

[id number] PRN; Protocol


   PRN Reason: TITRATE PER MD ORDER


   Last Admin: 02/14/19 03:12 Dose:  0.375 mcg/kg/min, 8.93 mls/hr


Potassium Chloride (Potassium Chloride 10 Meq/100 Ml)  10 meq in 100 mls @ 50 

mls/hr IVPB Q2H [facility]


   Stop: 02/14/19 12:14


Magnesium Sulfate (Magnesium Sulfate 2 Gm/50 Ml Water)  2 gm in 50 mls @ 50 

mls/hr IVPB ONCE ONE


   Stop: 02/14/19 10:59


Potassium Chloride (Potassium Chloride 20 Meq/100 Ml)  20 meq in 100 mls @ 50 

mls/hr IVPB ONCE ONE


   Stop: 02/14/19 12:59


Insulin Human Regular (Humulin R Med)  0 units SC ACHS [facility]; Protocol


   Last Admin: 02/14/19 07:30 Dose:  Not Given


Levalbuterol HCl (Xopenex)  0.63 mg IH [id number] PRN


   PRN Reason: Shortness of Breath


Levalbuterol HCl (Xopenex)  0.63 mg IH Q8 [facility]


   Last Admin: 02/14/19 08:21 Dose:  0.63 mg


Levothyroxine Sodium (Synthroid)  25 mcg PO 0600 [facility]


   Last Admin: 02/14/19 05:13 Dose:  25 mcg


Magnesium Citrate (Citrate Of Mag)  300 ml PO ONCE ONE


   Stop: 02/14/19 16:01


Magnesium Oxide (Mag-Ox)  400 mg PO BID [facility]


Ondansetron HCl (Zofran Inj)  4 mg IVP Q4H PRN


   PRN Reason: Nausea/Vomiting


Pantoprazole Sodium (Protonix Inj)  40 mg IVP Q12 [facility]


Potassium Chloride (Klor-Con 10)  10 meq PO BRK [facility]


Ramipril (Altace)  10 mg PO DAILY [facility]


   Last Admin: 02/13/19 09:46 Dose:  10 mg


Tramadol HCl (Ultram)  50 mg PO Q6 PRN


   PRN Reason: Pain, moderate (4-7)











Physical Exam





- Constitutional


Appears: No Acute Distress





- Head Exam


Head Exam: NORMAL INSPECTION





- Eye Exam


Eye Exam: Normal appearance





- ENT Exam


ENT Exam: Mucous Membranes Moist





- Respiratory Exam


Respiratory Exam: Clear to Auscultation Bilateral.  absent: Rales, Rhonchi, 

Wheezes





- Cardiovascular Exam


Cardiovascular Exam: RRR, +S1, +S2.  absent: Diastolic murmur, Gallop, Rubs, 

Systolic Murmur





- GI/Abdominal Exam


GI & Abdominal Exam: Soft.  absent: Distended, Guarding, Rigid, Tenderness





- Rectal Exam


Additional comments: 





bloody clots and slow ooze noted, no masses or lesions appreciated








- Extremities Exam


Extremities exam: Positive for: normal inspection





- Back Exam


Back exam: NORMAL INSPECTION





- Neurological Exam


Neurological exam: Alert, Oriented x3





- Skin


Skin Exam: Normal Color, Warm





Results





- Vital Signs


Recent Vital Signs: 


                                Last Vital Signs











Temp  97.9 F   02/14/19 09:00


 


Pulse  68   02/14/19 09:00


 


Resp  18   02/14/19 09:00


 


BP  137/85   02/14/19 09:00


 


Pulse Ox  98   02/14/19 05:40














- Labs


Result Diagrams: 


                                 02/14/19 09:25





                                 02/14/19 07:25


Labs: 


                         Laboratory Results - last 24 hr











  02/12/19 02/13/19 02/13/19





  10:30 08:15 15:56


 


WBC   


 


RBC   


 


Hgb   


 


Hct   


 


MCV   


 


MCH   


 


MCHC   


 


RDW   


 


Plt Count   


 


MPV   


 


Neut % (Auto)   


 


Lymph % (Auto)   


 


Mono % (Auto)   


 


Eos % (Auto)   


 


Baso % (Auto)   


 


Lymph # (Auto)   


 


Mono # (Auto)   


 


Eos # (Auto)   


 


Baso # (Auto)   


 


Absolute Neuts (auto)   


 


PT   


 


INR   


 


APTT   


 


Sodium   


 


Potassium   


 


Chloride   


 


Carbon Dioxide   


 


Anion Gap   


 


BUN   


 


Creatinine   


 


Est GFR ( Amer)   


 


Est GFR (Non-Af Amer)   


 


POC Glucose (mg/dL)    213 H


 


Random Glucose   


 


Uric Acid   


 


Calcium   


 


Phosphorus   


 


Magnesium   


 


Total Bilirubin   


 


Direct Bilirubin   


 


AST   


 


ALT   


 


Alkaline Phosphatase   


 


NT-Pro-B Natriuret Pep   5050 H 


 


Total Protein   


 


Albumin   


 


Globulin   


 


Albumin/Globulin Ratio   


 


HIV 1&2 Ag/Ab, 4th Gen  Nonreactive  


 


Blood Type   


 


Blood Type Confirm   


 


Antibody Screen   


 


Crossmatch   


 


BBK History Checked   














  02/13/19 02/13/19 02/13/19





  21:14 21:45 21:45


 


WBC   4.4 L 


 


RBC   3.62 


 


Hgb   11.5 L 


 


Hct   34.7 L 


 


MCV   95.9 


 


MCH   31.8 


 


MCHC   33.1 


 


RDW   13.5 


 


Plt Count   128 


 


MPV   9.4 


 


Neut % (Auto)   79.7 H 


 


Lymph % (Auto)   11.4 L 


 


Mono % (Auto)   6.6 H 


 


Eos % (Auto)   2.3 


 


Baso % (Auto)   0.0 


 


Lymph # (Auto)   0.5 L 


 


Mono # (Auto)   0.3 


 


Eos # (Auto)   0.1 


 


Baso # (Auto)   0.00 


 


Absolute Neuts (auto)   3.49 


 


PT    17.2 H


 


INR    1.55


 


APTT    37.8


 


Sodium   


 


Potassium   


 


Chloride   


 


Carbon Dioxide   


 


Anion Gap   


 


BUN   


 


Creatinine   


 


Est GFR ( Amer)   


 


Est GFR (Non-Af Amer)   


 


POC Glucose (mg/dL)  248 H  


 


Random Glucose   


 


Uric Acid   


 


Calcium   


 


Phosphorus   


 


Magnesium   


 


Total Bilirubin   


 


Direct Bilirubin   


 


AST   


 


ALT   


 


Alkaline Phosphatase   


 


NT-Pro-B Natriuret Pep   


 


Total Protein   


 


Albumin   


 


Globulin   


 


Albumin/Globulin Ratio   


 


HIV 1&2 Ag/Ab, 4th Gen   


 


Blood Type   


 


Blood Type Confirm   


 


Antibody Screen   


 


Crossmatch   


 


BBK History Checked   














  02/13/19 02/13/19 02/14/19





  21:45 22:17 07:23


 


WBC   


 


RBC   


 


Hgb   


 


Hct   


 


MCV   


 


MCH   


 


MCHC   


 


RDW   


 


Plt Count   


 


MPV   


 


Neut % (Auto)   


 


Lymph % (Auto)   


 


Mono % (Auto)   


 


Eos % (Auto)   


 


Baso % (Auto)   


 


Lymph # (Auto)   


 


Mono # (Auto)   


 


Eos # (Auto)   


 


Baso # (Auto)   


 


Absolute Neuts (auto)   


 


PT   


 


INR   


 


APTT   


 


Sodium   


 


Potassium   


 


Chloride   


 


Carbon Dioxide   


 


Anion Gap   


 


BUN   


 


Creatinine   


 


Est GFR ( Amer)   


 


Est GFR (Non-Af Amer)   


 


POC Glucose (mg/dL)    183 H


 


Random Glucose   


 


Uric Acid   


 


Calcium   


 


Phosphorus   


 


Magnesium   


 


Total Bilirubin   


 


Direct Bilirubin   


 


AST   


 


ALT   


 


Alkaline Phosphatase   


 


NT-Pro-B Natriuret Pep   


 


Total Protein   


 


Albumin   


 


Globulin   


 


Albumin/Globulin Ratio   


 


HIV 1&2 Ag/Ab, 4th Gen   


 


Blood Type  B POSITIVE  


 


Blood Type Confirm   B POSITIVE 


 


Antibody Screen  Negative  


 


Crossmatch  See Detail  


 


BBK History Checked  No verified bt  














  02/14/19 02/14/19 02/14/19





  07:25 07:25 09:25


 


WBC   4.8  4.7


 


RBC   3.70  3.59


 


Hgb   11.8 L  11.4 L


 


Hct   35.5 L  34.2 L


 


MCV   95.9  95.3


 


MCH   31.9  31.8


 


MCHC   33.2  33.3


 


RDW   13.6  13.3


 


Plt Count   148  147


 


MPV   10.0  9.8


 


Neut % (Auto)   79.4 H  77.7 H


 


Lymph % (Auto)   10.8 L  9.9 L


 


Mono % (Auto)   7.1 H  9.9 H


 


Eos % (Auto)   2.5  2.3


 


Baso % (Auto)   0.2  0.2


 


Lymph # (Auto)   0.5 L  0.5 L


 


Mono # (Auto)   0.3  0.5


 


Eos # (Auto)   0.1  0.1


 


Baso # (Auto)   0.01  0.01


 


Absolute Neuts (auto)   3.82  3.67


 


PT   


 


INR   


 


APTT   


 


Sodium  140  


 


Potassium  3.4 L  


 


Chloride  109 H  


 


Carbon Dioxide  25  


 


Anion Gap  10  


 


BUN  23 H  


 


Creatinine  1.0  


 


Est GFR ( Amer)  > 60  


 


Est GFR (Non-Af Amer)  > 60  


 


POC Glucose (mg/dL)   


 


Random Glucose  167 H  


 


Uric Acid  7.0  


 


Calcium  8.4  


 


Phosphorus  2.6  


 


Magnesium  1.6 L  


 


Total Bilirubin  0.9  


 


Direct Bilirubin  0.5 H  


 


AST  26  


 


ALT  21  


 


Alkaline Phosphatase  59  


 


NT-Pro-B Natriuret Pep  3320 H  


 


Total Protein  5.5 L  


 


Albumin  2.9 L  


 


Globulin  2.6  


 


Albumin/Globulin Ratio  1.1  


 


HIV 1&2 Ag/Ab, 4th Gen   


 


Blood Type   


 


Blood Type Confirm   


 


Antibody Screen   


 


Crossmatch   


 


BBK History Checked   














Assessment & Plan





- Assessment and Plan (Free Text)


Assessment: 





84 yo M with PMH of HTN, CAD s/p CABG, T2DM, HLD, and asthma admitted to [facility] [place] for

NSTEMI and underwent cardiac catherization with 3 BMS placed in the mid and 

proximal RCA. Patient is transferred to the ICU due to active GI bleed. Patient 

currently hemodynamically stable, will continue to monitor.





Plan: 





Neuro


- AOx3


- Maintain normothermia





CV


- EF noted to be 20-25% during cath


- Milrinone gtt


- Cont ACEi, BB, and statin


- Hold DAPT due to GI bleed


- Maintain MAP > 65


- Cardiology following





Pulm


- Maintain O2 sat > 92%





GI


- Bleeding scan report pending


- Protonix BID


- NPO


- Bowel prep


- GI consulted: plan for c-scope tomorrow





Renal


- Potassium and Magnesium repleted


- Cont to monitor electrolytes replete as needed


- Maintain euvolemia





Heme


- CBC q4h


- Type and crossmatch


- If Hgb continues to decrease will transfuse


- Overall, recommend transfuse to maintain Hgb > 9


- Vitamin K per GI


- Recheck INR in morning, goal INR < 1.5 for procedure





ID


- IV abx per primary





Endo


- ISS


- Accuchecks


- Maintain euglycemia





Patient seen and discussed in detail with Dr. Vital.


Tacho Yin, DO PGY2








<Joe Vital - Last Filed: 02/14/19 13:12>





Meds





- Medications


Medications: 


                               Current Medications





Acetaminophen (Tylenol 325mg Tab)  650 mg PO Q6 PRN


   PRN Reason: TEMP>=99.5F


Acetaminophen (Tylenol 650 Mg Supp)  650 mg RC Q6H PRN


   PRN Reason: TEMP>=99.5F


Acetylcysteine (Acetylcysteine 20%)  4 ml IH Q8 [facility]


   Last Admin: 02/14/19 08:21 Dose:  4 ml


Atorvastatin Calcium (Lipitor)  40 mg PO DIN [facility]


   Last Admin: 02/13/19 16:15 Dose:  Not Given


Carvedilol (Coreg)  12.5 mg PO BID [facility]


   Last Admin: 02/14/19 11:11 Dose:  12.5 mg


Dextrose (Dextrose 50% Inj)  0 ml IV STAT PRN; Protocol


   PRN Reason: Hypoglycemia Protocol


Docusate Sodium (Colace)  100 mg PO TID [facility]


   Last Admin: 02/14/19 10:58 Dose:  100 mg


Famotidine (Pepcid)  40 mg PO HS [facility]


   Last Admin: 02/14/19 00:46 Dose:  40 mg


Furosemide (Lasix)  40 mg IVP DAILY [facility]


   Last Admin: 02/14/19 11:12 Dose:  40 mg


Doxycycline Hyclate 100 mg/ (Sodium Chloride)  100 mls @ 100 mls/hr IVPB Q12 

[facility]; Protocol


   Last Admin: 02/14/19 12:41 Dose:  100 mls/hr


Ceftriaxone Sodium (Rocephin 1 Gram Ivpb)  1 gm in 100 mls @ 100 mls/hr IVPB 

DAILY [facility]; Protocol


   Last Admin: 02/14/19 11:25 Dose:  100 mls/hr


Dextrose (Dextrose 5% In Water 1000 Ml)  1,000 mls @ 0 mls/hr IV .Q0M PRN; 

Protocol


   PRN Reason: Hypoglycemia Protocol


Metronidazole (Flagyl)  500 mg in 100 mls @ 100 mls/hr IVPB Q8 [facility]; Protocol


   Last Admin: 02/14/19 05:13 Dose:  100 mls/hr


Milrinone Lactate/Dextrose (Primacor 20mg/100ml D5w)  100 mls @ 8.93 mls/hr IV 

.[id number] PRN; Protocol


   PRN Reason: TITRATE PER MD ORDER


   Last Admin: 02/14/19 03:12 Dose:  0.375 mcg/kg/min, 8.93 mls/hr


Potassium Chloride (Potassium Chloride 10 Meq/100 Ml)  10 meq in 100 mls @ 50 

mls/hr IVPB Q2H [facility]


   Stop: 02/14/19 16:14


   Last Admin: 02/14/19 12:54 Dose:  50 mls/hr


Insulin Human Regular (Humulin R Med)  0 units SC ACHS [facility]; Protocol


   Last Admin: 02/14/19 07:30 Dose:  Not Given


Levalbuterol HCl (Xopenex)  0.63 mg IH [id number] PRN


   PRN Reason: Shortness of Breath


Levalbuterol HCl (Xopenex)  0.63 mg IH Q8 [facility]


   Last Admin: 02/14/19 08:21 Dose:  0.63 mg


Levothyroxine Sodium (Synthroid)  25 mcg PO 0600 [facility]


   Last Admin: 02/14/19 05:13 Dose:  25 mcg


Magnesium Citrate (Citrate Of Mag)  300 ml PO ONCE ONE


   Stop: 02/14/19 16:01


Magnesium Oxide (Mag-Ox)  400 mg PO BID [facility]


   Last Admin: 02/14/19 10:58 Dose:  400 mg


Ondansetron HCl (Zofran Inj)  4 mg IVP Q4H PRN


   PRN Reason: Nausea/Vomiting


Pantoprazole Sodium (Protonix Inj)  40 mg IVP Q12 [facility]


   Last Admin: 02/14/19 11:24 Dose:  40 mg


Potassium Chloride (Klor-Con 10)  10 meq PO BRK [facility]


Ramipril (Altace)  10 mg PO DAILY [facility]


   Last Admin: 02/14/19 11:32 Dose:  10 mg


Tramadol HCl (Ultram)  50 mg PO Q6 PRN


   PRN Reason: Pain, moderate (4-7)











Results





- Vital Signs


Recent Vital Signs: 


                                Last Vital Signs











Temp  97.9 F   02/14/19 09:00


 


Pulse  65   02/14/19 11:11


 


Resp  18   02/14/19 09:00


 


BP  138/68   02/14/19 11:12


 


Pulse Ox  98   02/14/19 05:40














- Labs


Result Diagrams: 


                                 02/14/19 11:10





                                 02/14/19 07:25


Labs: 


                         Laboratory Results - last 24 hr











  02/13/19 02/13/19 02/13/19





  08:15 15:56 21:14


 


WBC   


 


RBC   


 


Hgb   


 


Hct   


 


MCV   


 


MCH   


 


MCHC   


 


RDW   


 


Plt Count   


 


MPV   


 


Neut % (Auto)   


 


Lymph % (Auto)   


 


Mono % (Auto)   


 


Eos % (Auto)   


 


Baso % (Auto)   


 


Lymph # (Auto)   


 


Mono # (Auto)   


 


Eos # (Auto)   


 


Baso # (Auto)   


 


Absolute Neuts (auto)   


 


PT   


 


INR   


 


APTT   


 


Sodium   


 


Potassium   


 


Chloride   


 


Carbon Dioxide   


 


Anion Gap   


 


BUN   


 


Creatinine   


 


Est GFR ( Amer)   


 


Est GFR (Non-Af Amer)   


 


POC Glucose (mg/dL)   213 H  248 H


 


Random Glucose   


 


Uric Acid   


 


Calcium   


 


Phosphorus   


 


Magnesium   


 


Total Bilirubin   


 


Direct Bilirubin   


 


AST   


 


ALT   


 


Alkaline Phosphatase   


 


NT-Pro-B Natriuret Pep  5050 H  


 


Total Protein   


 


Albumin   


 


Globulin   


 


Albumin/Globulin Ratio   


 


Blood Type   


 


Blood Type Confirm   


 


Antibody Screen   


 


Crossmatch   


 


BBK History Checked   














  02/13/19 02/13/19 02/13/19





  21:45 21:45 21:45


 


WBC  4.4 L  


 


RBC  3.62  


 


Hgb  11.5 L  


 


Hct  34.7 L  


 


MCV  95.9  


 


MCH  31.8  


 


MCHC  33.1  


 


RDW  13.5  


 


Plt Count  128  


 


MPV  9.4  


 


Neut % (Auto)  79.7 H  


 


Lymph % (Auto)  11.4 L  


 


Mono % (Auto)  6.6 H  


 


Eos % (Auto)  2.3  


 


Baso % (Auto)  0.0  


 


Lymph # (Auto)  0.5 L  


 


Mono # (Auto)  0.3  


 


Eos # (Auto)  0.1  


 


Baso # (Auto)  0.00  


 


Absolute Neuts (auto)  3.49  


 


PT   17.2 H 


 


INR   1.55 


 


APTT   37.8 


 


Sodium   


 


Potassium   


 


Chloride   


 


Carbon Dioxide   


 


Anion Gap   


 


BUN   


 


Creatinine   


 


Est GFR ( Amer)   


 


Est GFR (Non-Af Amer)   


 


POC Glucose (mg/dL)   


 


Random Glucose   


 


Uric Acid   


 


Calcium   


 


Phosphorus   


 


Magnesium   


 


Total Bilirubin   


 


Direct Bilirubin   


 


AST   


 


ALT   


 


Alkaline Phosphatase   


 


NT-Pro-B Natriuret Pep   


 


Total Protein   


 


Albumin   


 


Globulin   


 


Albumin/Globulin Ratio   


 


Blood Type    B POSITIVE


 


Blood Type Confirm   


 


Antibody Screen    Negative


 


Crossmatch    See Detail


 


BBK History Checked    No verified bt














  02/13/19 02/14/19 02/14/19





  22:17 07:23 07:25


 


WBC   


 


RBC   


 


Hgb   


 


Hct   


 


MCV   


 


MCH   


 


MCHC   


 


RDW   


 


Plt Count   


 


MPV   


 


Neut % (Auto)   


 


Lymph % (Auto)   


 


Mono % (Auto)   


 


Eos % (Auto)   


 


Baso % (Auto)   


 


Lymph # (Auto)   


 


Mono # (Auto)   


 


Eos # (Auto)   


 


Baso # (Auto)   


 


Absolute Neuts (auto)   


 


PT   


 


INR   


 


APTT   


 


Sodium    140


 


Potassium    3.4 L


 


Chloride    109 H


 


Carbon Dioxide    25


 


Anion Gap    10


 


BUN    23 H


 


Creatinine    1.0


 


Est GFR ( Amer)    > 60


 


Est GFR (Non-Af Amer)    > 60


 


POC Glucose (mg/dL)   183 H 


 


Random Glucose    167 H


 


Uric Acid    7.0


 


Calcium    8.4


 


Phosphorus    2.6


 


Magnesium    1.6 L


 


Total Bilirubin    0.9


 


Direct Bilirubin    0.5 H


 


AST    26


 


ALT    21


 


Alkaline Phosphatase    59


 


NT-Pro-B Natriuret Pep    3320 H


 


Total Protein    5.5 L


 


Albumin    2.9 L


 


Globulin    2.6


 


Albumin/Globulin Ratio    1.1


 


Blood Type   


 


Blood Type Confirm  B POSITIVE  


 


Antibody Screen   


 


Crossmatch   


 


BBK History Checked   














  02/14/19 02/14/19 02/14/19





  07:25 09:25 11:10


 


WBC  4.8  4.7 


 


RBC  3.70  3.59 


 


Hgb  11.8 L  11.4 L  10.9 L


 


Hct  35.5 L  34.2 L  33.0 L


 


MCV  95.9  95.3 


 


MCH  31.9  31.8 


 


MCHC  33.2  33.3 


 


RDW  13.6  13.3 


 


Plt Count  148  147 


 


MPV  10.0  9.8 


 


Neut % (Auto)  79.4 H  77.7 H 


 


Lymph % (Auto)  10.8 L  9.9 L 


 


Mono % (Auto)  7.1 H  9.9 H 


 


Eos % (Auto)  2.5  2.3 


 


Baso % (Auto)  0.2  0.2 


 


Lymph # (Auto)  0.5 L  0.5 L 


 


Mono # (Auto)  0.3  0.5 


 


Eos # (Auto)  0.1  0.1 


 


Baso # (Auto)  0.01  0.01 


 


Absolute Neuts (auto)  3.82  3.67 


 


PT   


 


INR   


 


APTT   


 


Sodium   


 


Potassium   


 


Chloride   


 


Carbon Dioxide   


 


Anion Gap   


 


BUN   


 


Creatinine   


 


Est GFR ( Amer)   


 


Est GFR (Non-Af Amer)   


 


POC Glucose (mg/dL)   


 


Random Glucose   


 


Uric Acid   


 


Calcium   


 


Phosphorus   


 


Magnesium   


 


Total Bilirubin   


 


Direct Bilirubin   


 


AST   


 


ALT   


 


Alkaline Phosphatase   


 


NT-Pro-B Natriuret Pep   


 


Total Protein   


 


Albumin   


 


Globulin   


 


Albumin/Globulin Ratio   


 


Blood Type   


 


Blood Type Confirm   


 


Antibody Screen   


 


Crossmatch   


 


BBK History Checked   














Assessment & Plan





- Assessment and Plan (Free Text)


Plan: 


Patient seen and examined on rounds with resident, agree with note with 

following additions/exceptions:


Patient is 84yo male with PMHx HTN, CAD s/p CABG, T2DM, HLD, and asthma 

initially admitted with CP and dyspnea on exertion, found to have an NSTEMI and 

underwent cardiac catherization which revealed severe triple vessel diseas with 

placement of three bare-metal stent placed in the mid- and proximal RCA


This morning had BRBPR, bleeding scan obtained. 


Currently afebrile, SBP 130s, HD stable, comfortable, sleeping, in NAD


HH noted, 11.4-->10.9, HR 80s


ASA, Plavix held


GI consulted, Dr Dobbs


Cardiology follow up





CAD


CABG


s/p PCI


DMII


HLD


Acute GIB, likely Lower





Recommend:


- supp o2 as needed, duonebs PRN


- NO ID issues


- Hold BP meds


- Hold ASA, plavix


, transfuse goal HH>9


- CBC q4hr


- Follow up GI, plan for colonoscopy tomorrow


- Follow up cardio, Dr Ann


- Vit K, FFP as needed


- maintain 2 large bore PIVs


- PPI


- DVT ppx, SCDs


- Monitor in MICU





Critical care time 35 minutes

## 2019-02-14 NOTE — PN
DATE:  02/14/2019



SUBJECTIVE:  The patient is awake without complaints in bed.



PHYSICAL EXAMINATION:

VITAL SIGNS:  Blood pressure 137/85, heart rates in the 60s.

NECK:  Negative JVD.

LUNGS:  Without rales.

HEART:  Reveals S1, S2.

EXTREMITIES:  Without edema.



LABORATORY DATA:  Hemoglobin is 11.4 this morning and 10.9 in the

afternoon.  BUN and creatinine is unremarkable.  Glucose 167.



IMPRESSION:

1.  Status post percutaneous transluminal coronary angioplasty and stent of

a critically stenosed right coronary artery with bare-metal stents.

2.  Gastrointestinal bleed once the patient was placed on aspirin and

Plavix.

3.  End-stage ischemic dilated cardiomyopathy with an ejection fraction of

25%.

4.  History of coronary bypass surgery.

5.  Diabetes mellitus.



Given these findings, aspirin and Plavix has been on hold.  The patient

scheduled for endoscopy tomorrow to see if we can identify the source of

his bleeding.  We will not give platelet transfusions at this time.  We

will give packed red blood cells if necessary.







__________________________________________

Onel Ann MD





DD:  02/14/2019 12:15:50

DT:  02/14/2019 12:20:38

Job # [id number]

## 2019-02-14 NOTE — CP.PCM.PN
Objective





- Vital Signs/Intake and Output


Vital Signs (last 24 hours): 


                                        











Temp Pulse Resp BP Pulse Ox


 


 97.9 F   65   18   138/68   98 


 


 02/14/19 09:00  02/14/19 11:11  02/14/19 09:00  02/14/19 11:12  02/14/19 05:40








Intake and Output: 


                                        











 02/14/19 02/14/19





 06:59 18:59


 


Intake Total 1506 


 


Balance 1506 














- Medications


Medications: 


                               Current Medications





Acetaminophen (Tylenol 325mg Tab)  650 mg PO Q6 PRN


   PRN Reason: TEMP>=99.5F


Acetaminophen (Tylenol 650 Mg Supp)  650 mg RC Q6H PRN


   PRN Reason: TEMP>=99.5F


Acetylcysteine (Acetylcysteine 20%)  4 ml IH Q8 [facility]


   Last Admin: 02/14/19 08:21 Dose:  4 ml


Atorvastatin Calcium (Lipitor)  40 mg PO DIN [facility]


   Last Admin: 02/13/19 16:15 Dose:  Not Given


Carvedilol (Coreg)  12.5 mg PO BID [facility]


   Last Admin: 02/14/19 11:11 Dose:  12.5 mg


Dextrose (Dextrose 50% Inj)  0 ml IV STAT PRN; Protocol


   PRN Reason: Hypoglycemia Protocol


Docusate Sodium (Colace)  100 mg PO TID [facility]


   Last Admin: 02/14/19 10:58 Dose:  100 mg


Famotidine (Pepcid)  40 mg PO HS [facility]


   Last Admin: 02/14/19 00:46 Dose:  40 mg


Furosemide (Lasix)  40 mg IVP DAILY [facility]


   Last Admin: 02/14/19 11:12 Dose:  40 mg


Doxycycline Hyclate 100 mg/ (Sodium Chloride)  100 mls @ 100 mls/hr IVPB Q12 

[facility]; Protocol


   Last Admin: 02/14/19 00:45 Dose:  100 mls/hr


Ceftriaxone Sodium (Rocephin 1 Gram Ivpb)  1 gm in 100 mls @ 100 mls/hr IVPB 

DAILY [facility]; Protocol


   Last Admin: 02/14/19 11:25 Dose:  100 mls/hr


Dextrose (Dextrose 5% In Water 1000 Ml)  1,000 mls @ 0 mls/hr IV .Q0M PRN; 

Protocol


   PRN Reason: Hypoglycemia Protocol


Metronidazole (Flagyl)  500 mg in 100 mls @ 100 mls/hr IVPB Q8 [facility]; Protocol


   Last Admin: 02/14/19 05:13 Dose:  100 mls/hr


Milrinone Lactate/Dextrose (Primacor 20mg/100ml D5w)  100 mls @ 8.93 mls/hr IV 

[id number] PRN; Protocol


   PRN Reason: TITRATE PER MD ORDER


   Last Admin: 02/14/19 03:12 Dose:  0.375 mcg/kg/min, 8.93 mls/hr


Potassium Chloride (Potassium Chloride 10 Meq/100 Ml)  10 meq in 100 mls @ 50 

mls/hr IVPB Q2H [facility]


   Stop: 02/14/19 16:14


   Last Admin: 02/14/19 10:56 Dose:  50 mls/hr


Insulin Human Regular (Humulin R Med)  0 units SC ACHS [facility]; Protocol


   Last Admin: 02/14/19 07:30 Dose:  Not Given


Levalbuterol HCl (Xopenex)  0.63 mg IH [id number] PRN


   PRN Reason: Shortness of Breath


Levalbuterol HCl (Xopenex)  0.63 mg IH Q8 [facility]


   Last Admin: 02/14/19 08:21 Dose:  0.63 mg


Levothyroxine Sodium (Synthroid)  25 mcg PO 0600 [facility]


   Last Admin: 02/14/19 05:13 Dose:  25 mcg


Magnesium Citrate (Citrate Of Mag)  300 ml PO ONCE ONE


   Stop: 02/14/19 16:01


Magnesium Oxide (Mag-Ox)  400 mg PO BID [facility]


   Last Admin: 02/14/19 10:58 Dose:  400 mg


Ondansetron HCl (Zofran Inj)  4 mg IVP Q4H PRN


   PRN Reason: Nausea/Vomiting


Pantoprazole Sodium (Protonix Inj)  40 mg IVP Q12 [facility]


   Last Admin: 02/14/19 11:24 Dose:  40 mg


Potassium Chloride (Klor-Con 10)  10 meq PO BRK [facility]


Ramipril (Altace)  10 mg PO DAILY [facility]


   Last Admin: 02/14/19 11:32 Dose:  10 mg


Tramadol HCl (Ultram)  50 mg PO Q6 PRN


   PRN Reason: Pain, moderate (4-7)











- Labs


Labs: 


                                        





                                 02/14/19 11:10 





                                 02/14/19 07:25 





                                        











PT  17.2 SECONDS (9.4-12.5)  H  02/13/19  21:45    


 


INR  1.55   02/13/19  21:45    


 


APTT  37.8 Seconds (26.9-38.3)   02/13/19  21:45

## 2019-02-14 NOTE — CARD
--------------- APPROVED REPORT --------------





Date of service: 02/13/2019



EKG Measurement

Heart [id number]

ID [id number]

[id number]

[id number]

[id number]



<Conclusion>

Sinus bradycardia

Left ventricular hypertrophy with QRS widening

T wave abnormality, consider lateral ischemia

Prolonged QT

Abnormal ECG

## 2019-02-14 NOTE — PN
DATE:  02/14/2019



SUBJECTIVE:  The patient is seen lying in the bed in room 277, bed 2. 

Overnight nurse's notes and events were reviewed.  The patient had rectal

bleeding early this morning.  The patient underwent a stat bleeding scan,

lab work were done.  The bleeding scan shows active bleeding site in the

rectosigmoid area.  The patient is now at present examined in room 277, bed

2.  At present, the patient does not have any rectal bleeding.



PHYSICAL EXAMINATION:

GENERAL:  The patient is seen lying in the bed.

VITAL SIGNS:  T-max, the patient is afebrile.  Telemetry shows sinus

rhythm, heart rate 84, 76, 68, respirations 20, O2 sat in mid to high 90%,

blood pressure is 128/78 and 130/84.

HEENT:  Head:  Normocephalic, atraumatic.  HEENT examination shows pinkish

pale conjunctivae.  Anicteric sclerae.  No oropharyngeal lesion.

NECK:  No neck rigidity.

CHEST:  Shows median sternotomy surgical scar.

LUNGS:  Does not reveal any crackles, rales or wheezing.  Questionable

decreased breath sounds at the left base and positive rhonchi, left more

than the right.

CARDIOVASCULAR:  S1, S2, regular rhythm.  Questionable soft systolic

murmur, left sternal border, right second intercostal space, left second

intercostal space.

ABDOMEN:  Soft.  Positive bowel sounds.

GENITALIA:  Male.

RECTAL:  Guaiac positive.

EXTREMITIES:  Shows no pitting edema, no calf tenderness, no swelling.  The

swelling and edema of the lower extremity has resolved.

NEUROLOGIC:  Gait examination, not tested.

MUSCULOSKELETAL:  As per the body mass index.



DIAGNOSTIC DATA:  Labs from today.  The patient's baseline hemoglobin on

admission was 13-14 g.  A repeat hemoglobin this morning is 11.3 and 11.4,

hematocrit 33.  The patient's PT is slightly elevated at 17.  Chemistry;

CMP and LFTs were reviewed which show potassium was low at 3.2, magnesium

was 1.6.  The patient's bleeding scan report preliminary shows active

bleeding from the rectosigmoid area.



The patient has been consulted with Gastroenterology.



IMPRESSION:

1.  Lower gastrointestinal bleeding with positive bleeding scan showing

active rectosigmoid bleeding.

2.  Acute blood loss anemia with decreasing hemoglobin and hematocrit.

3.  Mild coagulopathy with prothrombin time of 17.2.

4.  Acute non-ST-elevation myocardial function with elevated troponin.

5.  Status post angioplasty and stent placement of right coronary artery

with multiple bare-metal stents.

6.  Congestive heart failure with elevated pro b-type natriuretic peptide.

7.  Left sided multilobar pneumonia.

8.  Anemia with decreasing hemoglobin and hematocrit secondary to acute

blood loss anemia secondary to more gastrointestinal bleeding.

9.  Hypokalemia.

10.  Hypomagnesemia.

11.  Ischemic dilated cardiomyopathy.

12.  Systolic congestive heart failure with elevated pro b-type natriuretic

peptide.

13.  Hypothyroidism.

14.  History of hypertension.



PLAN:  At this time while I finished examining the patient, the patient had

a recurrent lower gastrointestinal bleeding with passing of bright red

blood per rectum.  At this time, a stat ICU consult was requested.  The

patient had a repeat lab work ordered again.  GI was notified.  Cardiology

evaluation was requested right away.  Intensivist consults were ordered. 

While all this was going on, the patient was evaluated by the intensivist. 

The patient has been accepted to ICU.  The patient will be transferred to

ICU for further management.  Later on, I have seen the patient in ICU with

the patient's next of kin, Geovanni at bedside.  I have explained to the

patient's next of kin and the family about the patient's condition,

diagnosis, changes in the patient's clinical condition.  All the above

details and recommendation by all the physicians involved in the care of

the patient were explained to the patient's family and the next of kin,

Geovanni which he acknowledged and understood.  All risks and consequences were

explained to the patient's next of kin and the family at length.



The patient will be continued on the therapeutic intervention as per the

MAR of today.  The patient may need packed red blood cell transfusion

depending upon serial monitoring of the hemoglobin and hematocrit.  The

patient has been ordered potassium supplementation and magnesium

supplementation.  The patient will be continued on non-pharmacological DVT

prophylaxis.  The patient will be continued on high-dose proton pump

inhibitor.  The patient will be monitored in ICU until the patient is

stabilized and the rectal bleeding has stopped.  Depending upon the

patient's GI evaluation, the patient may need colonoscopy and probably

endoscopy.  At present, the patient will be transferred and admitted to

ICU.



The patient's medications will be as per the MAR of today.  The patient's

Lasix is decreased to 40 mg IV daily.  The patient is also started on

maintenance potassium, 10 mEq K-Dur daily from tomorrow.  The patient will

be continued on non-pharmacological DVT prophylaxis and pharmacological GI

prophylaxis.



Time spent is more than 35 minutes.



Dictated and electronically signed, not read.







__________________________________________

Сергей Harris MD





DD:  02/14/2019 15:31:44

DT:  02/14/2019 15:37:19

Job # [id number]

## 2019-02-14 NOTE — CP.PCM.PCO
Physician Communication Note





- Physician Communication Note


Physician Communication Note: s/p cardiac stent x 3, EF 20-25% milrinone, GIB GI

rec, continue antibiotic

## 2019-02-14 NOTE — CP.PCM.PN
Subjective





- Date & Time of Evaluation


Date of Evaluation: 02/14/19


Time of Evaluation: 11:03





- Subjective


Subjective: 





PGY-3 for Dr Harris





Pt has a daughter, pending brother to get more info. Require ativan x 1 last 

night. OFf Levo. Only on dubutamin and prexedex





Objective





- Vital Signs/Intake and Output


Vital Signs (last 24 hours): 


                                        











Temp Pulse Resp BP Pulse Ox


 


 97.9 F   68   18   137/85   98 


 


 02/14/19 09:00  02/14/19 09:00  02/14/19 09:00  02/14/19 09:00  02/14/19 05:40








Intake and Output: 


                                        











 02/14/19 02/14/19





 06:59 18:59


 


Intake Total 1506 


 


Balance 1506 














- Medications


Medications: 


                               Current Medications





Acetaminophen (Tylenol 325mg Tab)  650 mg PO Q6 PRN


   PRN Reason: TEMP>=99.5F


Acetaminophen (Tylenol 650 Mg Supp)  650 mg RC Q6H PRN


   PRN Reason: TEMP>=99.5F


Acetylcysteine (Acetylcysteine 20%)  4 ml IH Q8 [facility]


   Last Admin: 02/14/19 08:21 Dose:  4 ml


Atorvastatin Calcium (Lipitor)  40 mg PO DIN [facility]


   Last Admin: 02/13/19 16:15 Dose:  Not Given


Carvedilol (Coreg)  12.5 mg PO BID [facility]


   Last Admin: 02/13/19 17:35 Dose:  Not Given


Dextrose (Dextrose 50% Inj)  0 ml IV STAT PRN; Protocol


   PRN Reason: Hypoglycemia Protocol


Docusate Sodium (Colace)  100 mg PO TID [facility]


   Last Admin: 02/13/19 17:35 Dose:  Not Given


Famotidine (Pepcid)  40 mg PO HS [facility]


   Last Admin: 02/14/19 00:46 Dose:  40 mg


Furosemide (Lasix)  40 mg IVP DAILY [facility]


Doxycycline Hyclate 100 mg/ (Sodium Chloride)  100 mls @ 100 mls/hr IVPB Q12 

[facility]; Protocol


   Last Admin: 02/14/19 00:45 Dose:  100 mls/hr


Ceftriaxone Sodium (Rocephin 1 Gram Ivpb)  1 gm in 100 mls @ 100 mls/hr IVPB 

DAILY [facility]; Protocol


   Last Admin: 02/13/19 20:25 Dose:  100 mls/hr


Dextrose (Dextrose 5% In Water 1000 Ml)  1,000 mls @ 0 mls/hr IV .Q0M PRN; 

Protocol


   PRN Reason: Hypoglycemia Protocol


Metronidazole (Flagyl)  500 mg in 100 mls @ 100 mls/hr IVPB Q8 [facility]; Protocol


   Last Admin: 02/14/19 05:13 Dose:  100 mls/hr


Milrinone Lactate/Dextrose (Primacor 20mg/100ml D5w)  100 mls @ 8.93 mls/hr IV 

[id number] PRN; Protocol


   PRN Reason: TITRATE PER MD ORDER


   Last Admin: 02/14/19 03:12 Dose:  0.375 mcg/kg/min, 8.93 mls/hr


Potassium Chloride (Potassium Chloride 10 Meq/100 Ml)  10 meq in 100 mls @ 50 

mls/hr IVPB Q2H [facility]


   Stop: 02/14/19 12:14


Potassium Chloride (Potassium Chloride 20 Meq/100 Ml)  20 meq in 100 mls @ 50 

mls/hr IVPB ONCE ONE


   Stop: 02/14/19 12:59


Insulin Human Regular (Humulin R Med)  0 units SC ACHS [facility]; Protocol


   Last Admin: 02/14/19 07:30 Dose:  Not Given


Levalbuterol HCl (Xopenex)  0.63 mg IH [id number] PRN


   PRN Reason: Shortness of Breath


Levalbuterol HCl (Xopenex)  0.63 mg IH Q8 [facility]


   Last Admin: 02/14/19 08:21 Dose:  0.63 mg


Levothyroxine Sodium (Synthroid)  25 mcg PO 0600 [facility]


   Last Admin: 02/14/19 05:13 Dose:  25 mcg


Magnesium Citrate (Citrate Of Mag)  300 ml PO ONCE ONE


   Stop: 02/14/19 16:01


Magnesium Oxide (Mag-Ox)  400 mg PO BID [facility]


Ondansetron HCl (Zofran Inj)  4 mg IVP Q4H PRN


   PRN Reason: Nausea/Vomiting


Pantoprazole Sodium (Protonix Inj)  40 mg IVP Q12 [facility]


Potassium Chloride (Klor-Con 10)  10 meq PO BRK [facility]


Ramipril (Altace)  10 mg PO DAILY [facility]


   Last Admin: 02/13/19 09:46 Dose:  10 mg


Tramadol HCl (Ultram)  50 mg PO Q6 PRN


   PRN Reason: Pain, moderate (4-7)











- Labs


Labs: 


                                        





                                 02/14/19 09:25 





                                 02/14/19 07:25 





                                        











PT  17.2 SECONDS (9.4-12.5)  H  02/13/19  21:45    


 


INR  1.55   02/13/19  21:45    


 


APTT  37.8 Seconds (26.9-38.3)   02/13/19  21:45    














- Constitutional


Appears: Other (intubated)





- Head Exam


Head Exam: ATRAUMATIC, NORMAL INSPECTION, NORMOCEPHALIC





- Eye Exam


Eye Exam: EOMI, Normal appearance, PERRL.  absent: Scleral icterus


Pupil Exam: NORMAL ACCOMODATION





- ENT Exam


ENT Exam: Mucous Membranes Moist





- Neck Exam


Additional comments: 





No JVD





- Respiratory Exam


Respiratory Exam: Decreased Breath Sounds





- Cardiovascular Exam


Cardiovascular Exam: REGULAR RHYTHM, +S1, +S2





- GI/Abdominal Exam


GI & Abdominal Exam: Soft, Hypoactive Bowel Sounds





- Extremities Exam


Extremities Exam: Pedal Edema





- Neurological Exam


Additional comments: 





intubated





- Psychiatric Exam


Psychiatric exam: Flat Affect





- Skin


Skin Exam: Dry, Warm





Assessment and Plan





- Assessment and Plan (Free Text)


Plan: 





Mr Cui, 70M, with PMhx penicillin allergy, chronic back pain and vertebral 

disc disease admitted for b/l foot cellulitis r/o osteomyelitis and L thigh dec

ubitus. During the hospital stay, he developes hypoxemic hypercapnic respiratory

distress requiring high flow and BIPAP. He deveople acute CHF complicated by 

large b/l pleural effusion and suspected pneumonia with sepsis and high 

procalcitonin. He refused thoracentesis (2/11) He is upgrades to ICU on 12/22 

for AMS with increased lethargy and low BP requirng levophed and dobutamin. He 

received thoracentesis by bedside (2/12) 1700cc drained from R side. He was 

intubated on 2/12 for Hypercapnic hypoxemic respiratory distress. He had 

oliguria from low BP, now U/O improves on pressor, but developed NINI. His pulm 

status improves on lasix





Hypercapnic hypoxemic respiratory distress, intubated day 3


- decreased FIO2 requirment to 60%





Cardiogenic Shock dobutamin gtt


- Septic vs cardiogenic. Had rule out obstructive from PE (negative CTA-chest)





Possible Septic shock due to HAP with b/l foot cellulitis which grew MRSA. PLUS 

multiple decubitus ulcers


- Linezolid/Merem (day 4) 


- Taper stress dose steroid


- REGINO was only mildly abnormal at rest, possible PAD.


- For sacral decubitus stage 3, L thigh, he is on santyl and local wound care. 

No osteomyelitis


- pt refuses MRI to r/o osteomyelitis.


- 





CHF, b/l pleural effusion large, s/p R thoracentesis (POD #2)


RBBB (questionable new onset vs chronic) with prolong Qtc 496


- Echo (2/12): EF 50s (on pressor). RVSP 62. RV hypokinetic/dilated - 

Questionable RHF


- Hold beta blocker due to acute heart failure





NINI, likely ATN from hypoperfusion/low BP - strict i/o. trend cre/bun





He is anemic with Hb 8.4 on 2/6. He got 1u pRBC, s/p 5 days of Fe IV





Hyponatermia, mild, asymptomatci, s/p NS@50. Stop IVF for now due to deSat.





For anxiety, he is on ativan PRN





For tobacco addition,  for cessation and he is on nicoderm





dispo plan: PT recommends AIDA. [ ] Antibiotics. [ ] cytology from pleural 

effusion [ ] pending daughter contact. Now brother is main contact and consent





s/r/d/w Dr Harris

## 2019-02-14 NOTE — CON
DATE OF CONSULTATION:  02/14/2019



GASTROENTEROLOGY CONSULTATION



REQUESTING PHYSICIAN:  Dr. Сергей Harris.



REASON FOR CONSULTATION:  I have been asked to see this 85-year-old male

with a history of coronary artery disease status post coronary artery

bypass surgery, hypertension, diabetes mellitus, hyperlipidemia, asthma,

who comes to the hospital with chest pain and shortness of breath.  This

was associated with nausea, vomiting and diaphoresis.  Routine blood work

in the emergency room showed the patient to have elevated troponins.  The

patient underwent a cardiac catheterization yesterday, which revealed a

critical RCA lesion with subsequent bare metal stent placement.  I have

been asked to see this patient for rectal bleeding, which began yesterday,

but bleeding scan shows activity in the pelvic area.  He denies any

abdominal pain or currently with hematemesis, nausea, vomiting.



PAST MEDICAL HISTORY:  As above.  Again, he has a history of coronary

artery disease, hypertension, type 2 diabetes mellitus, hyperlipidemia,

asthma.



PAST SURGICAL HISTORY:  Coronary artery bypass surgery.



SOCIAL HISTORY:  Denies cigarette smoking or alcohol use.



FAMILY HISTORY:  Noncontributory.



REVIEW OF SYSTEMS:  Fourteen-point review of systems is notable for rectal

bleeding.



MEDICATIONS AT HOME:  Include tramadol, simvastatin, Starlix, ramipril,

Synthroid, glimepiride, Lasix, Plavix and baby aspirin.



PHYSICAL EXAMINATION:

GENERAL:  Elderly male, lying in bed, in no acute distress.

VITAL SIGNS:  Reveal a temperature of 97.8, blood pressure of 146/74, heart

rate is 67.

HEENT:  Revealed sclerae to be white.  Conjunctivae pink.

NECK:  Supple.

CHEST:  Lungs are clear.

CARDIAC:  Heart exam reveals a regular rate and rhythm.

ABDOMEN:  Soft, nontender.  He has a right inguinal hernia.

EXTREMITIES:  Showed no edema.



LABORATORY DATA:  Revealed white blood cell count 4.8, hemoglobin 11.8

which has been stable over the last 24 hours.  Chemistries reveal a

potassium of 3.4, chloride of 109, BUN 23, creatinine 1.  BNP 3320.  PT/INR

reveal 17.2 and 1.55 with a PTT of 37.8.



IMPRESSION:  An 85-year-old male with recent acute on non-ST-elevation

myocardial infarction, status post cardiac catheterization with placement

of a bare-metal stent in his right coronary artery now with lower

gastrointestinal bleeding.  Bleeding scan shows activity in the pelvis.



RECOMMENDATIONS:

1.  We will hold aspirin and Plavix for now.

2.  Give vitamin K to correct coagulopathy.

3.  Follow serial hematocrits.

4.  I will schedule the patient for a colonoscopy for the morning.







__________________________________________

Se Hill MD





DD:  02/14/2019 9:23:39

DT:  02/14/2019 9:27:01

Job # [id number]



MTDD

## 2019-02-14 NOTE — CARD
--------------- APPROVED REPORT --------------





Date of service: 02/13/2019



EXAM: Two-dimensional and M-mode echocardiogram with Doppler and 

color Doppler.



INDICATION

ACUTE CHF,NSTEMI



2D DIMENSIONS 

Left Atrium (2D)5.2   (1.6-4.0cm)IVSd1.3   (0.7-1.1cm)

LVDd5.2   (3.9-5.9cm)PWd1.2   (0.7-1.1cm)

LVDs4.6   (2.5-4.0cm)FS (%) 10.7   %

LVEF (%)23.4   (>50%)



M-Mode DIMENSIONS 

Aortic Root4.00   (2.2-3.7cm)Aortic Cusp Exc.1.80   (1.5-2.0cm)



Aortic Valve

AoV Peak Jkpberqm980.0cm/Isha Peak GR.9mmHgAI P 1/2 Hltq137gk



Mitral Valve

MV E Tsxxzkdg22.4cm/sMV A Xgwyfgig10.0cm/sE/A ratio2.4



TDI

E/Lateral E'0.0E/Medial E'0.0



Pulmonary Valve

PV Peak Ttdqlvbg60.4cm/sPV Peak Grad.1mmHg



Tricuspid Valve

TR Peak Drsefokq283tg/sRAP IKYLEJOS29oaRjTI Peak Gr.60mmHg

[id number]



 LEFT VENTRICLE 

The left ventricle is normal size. There is mild concentric left 

ventricular hypertrophy. The ejection fraction is severely impaired. 

There is global hypokinesis of the left ventricle.



 RIGHT VENTRICLE 

The right ventricle is mildly dilated.



 ATRIA 

The left atrium is severely dilated. The right atrium is severely 

dilated.



 AORTIC VALVE 

The aortic valve is mildly sclerotic. There is moderate aortic 

regurgitation. There is no aortic valvular stenosis. 



 MITRAL VALVE 

The reduced mitral leaflet separation suggests decreased flow through 

the mitral valve and poor cardiac output. The mitral valve is mildly 

thickened. Mitral regurgitation is severe.



 TRICUSPID VALVE 

The tricuspid valve is normal in structure. There is severe tricuspid 

regurgitation. There is severe pulmonary hypertension.



 PULMONIC VALVE 

The pulmonary valve is normal in structure. There is mild pulmonic 

valvular regurgitation. 



 GREAT VESSELS 

The aortic root is normal in size. Dilated IVC with poor inspiration 

collapse is consistent with elevated right atrial pressure.



 PERICARDIAL EFFUSION 

There is large left pleural effusion. There is no pericardial 

effusion.



<Conclusion>

Four chamber enlargement.

Severe LV systolic dysfunction with global hypokinesis.

Mild concentric LVH.

Moderate AI.

Severe MR.

Severe TR.

Severe pulmonary HTN.

Left pleural effusion present.

## 2019-02-14 NOTE — NM
Date of service: 



02/13/2019



PROCEDURE:  Nuclear medicine gastrointestinal bleeding scan.



HISTORY:

rectal bleeding



COMPARISON:

None available. 



TECHNIQUE:

4ccof patient blood was withdrawn and mixed with 33.0 mCi of 

technetium ultra tagged. Images of the abdomen and pelvis were 

obtained in the anterior and posterior projection at 1 min intervals 

over a period of 45 min.



FINDINGS:

Extravasation of contrast was observed at the level of the 

rectosigmoid junction. 



Physiologic activity was seen in the heart, liver, spleen and blood 

vessels.



IMPRESSION:

Positive examination for active gastrointestinal bleeding.  The 

origin of the bleeding is in the rectosigmoid region.

## 2019-02-15 NOTE — CP.PCM.PN
Subjective





- Date & Time of Evaluation


Date of Evaluation: 02/15/19


Time of Evaluation: 08:05





- Subjective


Subjective: 





Surgery Progress Note for Dr. Randle





S/E at bedside with nephew in room. As per nursing pt had multiple dark loose 

stools, no bright red blood noted. Pending colonoscopy today. Denies abdominal 

pain, fevers, chills, chest pain, sob, n/v, constipation or diarrhea, and 

dysuria. 





Objective





- Vital Signs/Intake and Output


Vital Signs (last 24 hours): 


                                        











Temp Pulse Resp BP Pulse Ox


 


 97.4 F L  64   14   135/79   100 


 


 02/15/19 04:00  02/15/19 05:40  02/15/19 05:40  02/15/19 05:01  02/15/19 05:40








Intake and Output: 


                                        











 02/15/19 02/15/19





 06:59 18:59


 


Intake Total 390 


 


Output Total 750 


 


Balance -360 














- Medications


Medications: 


                               Current Medications





Acetaminophen (Tylenol 325mg Tab)  650 mg PO Q6 PRN


   PRN Reason: TEMP>=99.5F


Acetaminophen (Tylenol 650 Mg Supp)  650 mg RC Q6H PRN


   PRN Reason: TEMP>=99.5F


Acetylcysteine (Acetylcysteine 20%)  4 ml IH Q8 [facility]


   Last Admin: 02/15/19 07:32 Dose:  Not Given


Atorvastatin Calcium (Lipitor)  40 mg PO DIN [facility]


   Last Admin: 02/14/19 16:50 Dose:  Not Given


Carvedilol (Coreg)  12.5 mg PO BID [facility]


   Last Admin: 02/14/19 11:11 Dose:  12.5 mg


Dextrose (Dextrose 50% Inj)  0 ml IV STAT PRN; Protocol


   PRN Reason: Hypoglycemia Protocol


   Last Admin: 02/14/19 21:52 Dose:  50 ml


Docusate Sodium (Colace)  100 mg PO TID [facility]


   Last Admin: 02/14/19 16:43 Dose:  Not Given


Famotidine (Pepcid)  40 mg PO HS [facility]


   Last Admin: 02/14/19 21:36 Dose:  40 mg


Furosemide (Lasix)  40 mg IVP DAILY [facility]


   Last Admin: 02/14/19 11:12 Dose:  40 mg


Doxycycline Hyclate 100 mg/ (Sodium Chloride)  100 mls @ 100 mls/hr IVPB Q12 

[facility]; Protocol


   Last Admin: 02/14/19 21:36 Dose:  100 mls/hr


Ceftriaxone Sodium (Rocephin 1 Gram Ivpb)  1 gm in 100 mls @ 100 mls/hr IVPB 

DAILY [facility]; Protocol


   Last Admin: 02/14/19 11:25 Dose:  100 mls/hr


Dextrose (Dextrose 5% In Water 1000 Ml)  1,000 mls @ 0 mls/hr IV .Q0M PRN; 

Protocol


   PRN Reason: Hypoglycemia Protocol


Metronidazole (Flagyl)  500 mg in 100 mls @ 100 mls/hr IVPB Q8 [facility]; Protocol


   Last Admin: 02/15/19 05:20 Dose:  100 mls/hr


Milrinone Lactate/Dextrose (Primacor 20mg/100ml D5w)  100 mls @ 8.93 mls/hr IV 

[id number] PRN; Protocol


   PRN Reason: TITRATE PER MD ORDER


   Last Admin: 02/15/19 05:21 Dose:  0.375 mcg/kg/min, 8.93 mls/hr


Insulin Human Regular (Humulin R Med)  0 units SC ACHS [facility]; Protocol


   Last Admin: 02/14/19 21:46 Dose:  Not Given


Levalbuterol HCl (Xopenex)  0.63 mg IH [id number] PRN


   PRN Reason: Shortness of Breath


Levalbuterol HCl (Xopenex)  0.63 mg IH Q8 [facility]


   Last Admin: 02/15/19 07:32 Dose:  Not Given


Levothyroxine Sodium (Synthroid)  25 mcg PO 0600 [facility]


   Last Admin: 02/15/19 05:23 Dose:  25 mcg


Magnesium Oxide (Mag-Ox)  400 mg PO BID [facility]


   Last Admin: 02/14/19 17:11 Dose:  400 mg


Ondansetron HCl (Zofran Inj)  4 mg IVP Q4H PRN


   PRN Reason: Nausea/Vomiting


Pantoprazole Sodium (Protonix Inj)  40 mg IVP Q12 [facility]


   Last Admin: 02/14/19 21:36 Dose:  40 mg


Potassium Chloride (Klor-Con 10)  10 meq PO BRK [facility]


Ramipril (Altace)  10 mg PO DAILY [facility]


   Last Admin: 02/14/19 11:32 Dose:  10 mg


Tramadol HCl (Ultram)  50 mg PO Q6 PRN


   PRN Reason: Pain, moderate (4-7)











- Labs


Labs: 


                                        





                                 02/15/19 05:40 





                                 02/15/19 05:40 





                                        











PT  17.2 SECONDS (9.4-12.5)  H  02/13/19  21:45    


 


INR  1.55   02/13/19  21:45    


 


APTT  37.8 Seconds (26.9-38.3)   02/13/19  21:45    














- Additional Findings


Additional findings: 





- Constitutional


Appears: Non-toxic, No Acute Distress





- Head Exam


Head Exam: ATRAUMATIC, NORMOCEPHALIC





- Eye Exam


Eye Exam: EOMI, Normal appearance, PERRL





- ENT Exam


ENT Exam: Mucous Membranes Moist





- Neck Exam


Neck exam: Negative for: Lymphadenopathy





- Respiratory Exam


Respiratory Exam: Decreased Breath Sounds (on the left) .  absent: Accessory 

Muscle Use, Rhonchi, Wheezes, Respiratory Distress





- Cardiovascular Exam


Cardiovascular Exam: REGULAR RHYTHM, +S1, +S2.  absent: Systolic Murmur





- GI/Abdominal Exam


GI & Abdominal Exam: Soft, Tenderness.  absent: Distended, Firm, Guarding, 

Rebound, Rigid


Indirect inguinal hernia, nontender, reduced, nonbulging clinically 





- Extremities Exam


Extremities exam: Positive for: normal capillary refill, pedal pulses present.  

Negative for: tenderness


Additional comments: 





+1 pitting edema on LE b/l





- Neurological Exam


Neurological exam: Alert, CN II-XII Intact, Oriented x3





- Skin


Skin Exam: Dry, Intact, noted midline incision on chest wall





Assessment and Plan





- Assessment and Plan (Free Text)


Assessment: 





85 M with PMHx CAD s/p CABG 1990, asthma, who presented with NSTEMI. Presently, 

doubt small bowel obstruction in light of tolerance of diet.


2/12/19 CT abd/pelvis: There is a right-sided inguinal hernia that contains a 

loop of bowel. This measures 3.4 x 5.2 cm. There is no associated obstruction. 

Re-evaluated patient for GI bleed. Transfer to ICU as per primary team.





PLAN:





Colonsocopy planned for 2/15 per GI team


Patient VSS stable, no acute surgical intervention warranted currently


On recent labs hgb stable


Likely bleeding has stabilized in setting of stable hgb, hemodynamically stable,

and no more bright red blood


Continue IV protonix


All other medical management as per ICU and primary team





All further recs as per Dr. Randle





PGY-1 Nabila Luna

## 2019-02-15 NOTE — CP.PCM.PN
Subjective





- Date & Time of Evaluation


Date of Evaluation: 02/15/19


Time of Evaluation: 00:04





- Subjective


Subjective: 





# 22 angiocath was inserted in left hand.





Objective





- Vital Signs/Intake and Output


Vital Signs (last 24 hours): 


                                        











Temp Pulse Resp BP Pulse Ox


 


 97.8 F   59 L  20   80/40 L  99 


 


 02/14/19 20:00  02/14/19 21:20  02/14/19 21:20  02/14/19 21:00  02/14/19 21:20








Intake and Output: 


                                        











 02/14/19 02/15/19





 18:59 06:59


 


Intake Total 1875 


 


Output Total 500 


 


Balance 1375 














- Medications


Medications: 


                               Current Medications





Acetaminophen (Tylenol 325mg Tab)  650 mg PO Q6 PRN


   PRN Reason: TEMP>=99.5F


Acetaminophen (Tylenol 650 Mg Supp)  650 mg RC Q6H PRN


   PRN Reason: TEMP>=99.5F


Acetylcysteine (Acetylcysteine 20%)  4 ml IH Q8 [facility]


   Last Admin: 02/14/19 21:03 Dose:  4 ml


Atorvastatin Calcium (Lipitor)  40 mg PO DIN [facility]


   Last Admin: 02/14/19 16:50 Dose:  Not Given


Carvedilol (Coreg)  12.5 mg PO BID [facility]


   Last Admin: 02/14/19 11:11 Dose:  12.5 mg


Dextrose (Dextrose 50% Inj)  0 ml IV STAT PRN; Protocol


   PRN Reason: Hypoglycemia Protocol


   Last Admin: 02/14/19 21:52 Dose:  50 ml


Diltiazem HCl (Cardizem)  15 mg IVP ONCE ONE


   Stop: 02/15/19 00:05


Docusate Sodium (Colace)  100 mg PO TID [facility]


   Last Admin: 02/14/19 16:43 Dose:  Not Given


Famotidine (Pepcid)  40 mg PO HS [facility]


   Last Admin: 02/14/19 21:36 Dose:  40 mg


Furosemide (Lasix)  40 mg IVP DAILY [facility]


   Last Admin: 02/14/19 11:12 Dose:  40 mg


Doxycycline Hyclate 100 mg/ (Sodium Chloride)  100 mls @ 100 mls/hr IVPB Q12 

[facility]; Protocol


   Last Admin: 02/14/19 21:36 Dose:  100 mls/hr


Ceftriaxone Sodium (Rocephin 1 Gram Ivpb)  1 gm in 100 mls @ 100 mls/hr IVPB 

DAILY [facility]; Protocol


   Last Admin: 02/14/19 11:25 Dose:  100 mls/hr


Dextrose (Dextrose 5% In Water 1000 Ml)  1,000 mls @ 0 mls/hr IV .Q0M PRN; 

Protocol


   PRN Reason: Hypoglycemia Protocol


Metronidazole (Flagyl)  500 mg in 100 mls @ 100 mls/hr IVPB Q8 [facility]; Protocol


   Last Admin: 02/14/19 21:35 Dose:  100 mls/hr


Milrinone Lactate/Dextrose (Primacor 20mg/100ml D5w)  100 mls @ 8.93 mls/hr IV 

[id number] PRN; Protocol


   PRN Reason: TITRATE PER MD ORDER


   Last Titration: 02/14/19 14:28 Dose:  0.375 mcg/kg/min, 8.93 mls/hr


Insulin Human Regular (Humulin R Med)  0 units SC ACHS [facility]; Protocol


   Last Admin: 02/14/19 21:46 Dose:  Not Given


Levalbuterol HCl (Xopenex)  0.63 mg IH [id number] PRN


   PRN Reason: Shortness of Breath


Levalbuterol HCl (Xopenex)  0.63 mg IH Q8 [facility]


   Last Admin: 02/14/19 21:03 Dose:  0.63 mg


Levothyroxine Sodium (Synthroid)  25 mcg PO 0600 [facility]


   Last Admin: 02/14/19 05:13 Dose:  25 mcg


Magnesium Oxide (Mag-Ox)  400 mg PO BID [facility]


   Last Admin: 02/14/19 17:11 Dose:  400 mg


Ondansetron HCl (Zofran Inj)  4 mg IVP Q4H PRN


   PRN Reason: Nausea/Vomiting


Pantoprazole Sodium (Protonix Inj)  40 mg IVP Q12 [facility]


   Last Admin: 02/14/19 21:36 Dose:  40 mg


Potassium Chloride (Klor-Con 10)  10 meq PO BRK [facility]


Ramipril (Altace)  10 mg PO DAILY [facility]


   Last Admin: 02/14/19 11:32 Dose:  10 mg


Tramadol HCl (Ultram)  50 mg PO Q6 PRN


   PRN Reason: Pain, moderate (4-7)











- Labs


Labs: 


                                        





                                 02/14/19 19:11 





                                 02/14/19 07:25 





                                        











PT  17.2 SECONDS (9.4-12.5)  H  02/13/19  21:45    


 


INR  1.55   02/13/19  21:45    


 


APTT  37.8 Seconds (26.9-38.3)   02/13/19  21:45

## 2019-02-15 NOTE — PN
DATE:  02/15/2019



SUBJECTIVE:  The patient is an 85-year-old female.  The patient is in the

Intensive Care Unit.  The patient was admitted after being examined in the

emergency room a couple of days ago.  She presented with shortness of

breath, abdominal pain.  The patient has history of coronary artery

disease, shows evidence of CABG with a incision.  The patient also has

chronic lung disease and non-insulin-dependent diabetes.  The patient is

seen this morning in the ICU awaiting to go for a colonoscopy by Dr. Hill.



PHYSICAL EXAMINATION

VITAL SIGNS:  Pulse is 64, blood pressure 110/74 and his respirations 15

per minute.

HEENT:  The patient's head is normocephalic.

NECK:  Thyroid is not enlarged.  JVP is flat.

LUNGS:  Trachea is central.  Breath sounds are diminished bilaterally.

HEART:  Normal sinus rhythm.  S1 and S2 present.

ABDOMEN:  Soft, there is no localizing signs.  The patient had bleeding

scan that shows evidence of bleeding in the rectosigmoid area.

CENTRAL NERVOUS SYSTEM:  He has signs of dementia.  No focal neurological

deficit.



MEDICATIONS:  The patient's medications consist of respiratory treatment. 

The patient also gets Mucomyst with treatment.  The patient is also taking

Altace 10 mg daily, aspirin 81 mg daily, Coreg 12.5 mg b.i.d.  The patient

is on IV drip, doxycycline 100 mg IV every 12 hours.  The patient is on

ferrous sulfate p.o., metronidazole 500 mg IV every 8 hours, insulin

coverage for elevated blood sugar.  The patient is on Lasix 40 mg IV daily,

Lipitor 40 mg daily, potassium chloride 10 mEq daily, magnesium oxide 400

mg b.i.d.



LABORATORY DATA:  The patient's blood work, the hemoglobin is 11.9 and

white count is normal.  Chemistry, the patient's BUN is 21, creatinine 1.2

and potassium is 3.5.  The patient's blood sugar was 117 this morning.  His

BNP is elevated at 2330 consistent with congestive heart failure.



The patient's condition is seem to be unstable.  The patient is getting

active treatment in the critical care unit and he is going to be examined

by Dr. Hill endoscopically and may be the patient needs sigmoidoscopy or

colonoscopy to evaluate the bleeding site.  Prognosis is guarded. 

Condition is _____.







__________________________________________

Santos Chaney MD





DD:  02/15/2019 8:58:25

DT:  02/15/2019 12:31:31

Psychiatric # [id number]

## 2019-02-15 NOTE — CP.PCM.PN
Subjective





- Date & Time of Evaluation


Date of Evaluation: 02/15/19


Time of Evaluation: 07:00





- Subjective


Subjective: 





PGY-3 for Dr Kimberly Tan Mr Tucker by bedside, help translation. 1 dark BM last night about 

200-250cc. No other acute complaint per 12 pt ROS





Objective





- Vital Signs/Intake and Output


Vital Signs (last 24 hours): 


                                        











Temp Pulse Resp BP Pulse Ox


 


 97.4 F L  64   14   135/79   100 


 


 02/15/19 04:00  02/15/19 05:40  02/15/19 05:40  02/15/19 05:01  02/15/19 08:11








Intake and Output: 


                                        











 02/15/19 02/15/19





 06:59 18:59


 


Intake Total 390 


 


Output Total 750 


 


Balance -360 














- Medications


Medications: 


                               Current Medications





Acetaminophen (Tylenol 325mg Tab)  650 mg PO Q6 PRN


   PRN Reason: TEMP>=99.5F


Acetaminophen (Tylenol 650 Mg Supp)  650 mg RC Q6H PRN


   PRN Reason: TEMP>=99.5F


Acetylcysteine (Acetylcysteine 20%)  4 ml IH Q8 [facility]


   Last Admin: 02/15/19 07:32 Dose:  Not Given


Aspirin (Aspirin Chewable)  81 mg PO DAILY [facility]


Atorvastatin Calcium (Lipitor)  40 mg PO DIN [facility]


   Last Admin: 02/14/19 16:50 Dose:  Not Given


Carvedilol (Coreg)  12.5 mg PO BID [facility]


   Last Admin: 02/14/19 11:11 Dose:  12.5 mg


Dextrose (Dextrose 50% Inj)  0 ml IV STAT PRN; Protocol


   PRN Reason: Hypoglycemia Protocol


   Last Admin: 02/14/19 21:52 Dose:  50 ml


Docusate Sodium (Colace)  100 mg PO TID [facility]


   Last Admin: 02/14/19 16:43 Dose:  Not Given


Famotidine (Pepcid)  40 mg PO HS [facility]


   Last Admin: 02/14/19 21:36 Dose:  40 mg


Ferrous Sulfate (Feosol)  324 mg PO TID [facility]


Furosemide (Lasix)  40 mg IVP DAILY [facility]


   Last Admin: 02/14/19 11:12 Dose:  40 mg


Doxycycline Hyclate 100 mg/ (Sodium Chloride)  100 mls @ 100 mls/hr IVPB Q12 

[facility]; Protocol


   Last Admin: 02/14/19 21:36 Dose:  100 mls/hr


Ceftriaxone Sodium (Rocephin 1 Gram Ivpb)  1 gm in 100 mls @ 100 mls/hr IVPB 

DAILY [facility]; Protocol


   Last Admin: 02/14/19 11:25 Dose:  100 mls/hr


Dextrose (Dextrose 5% In Water 1000 Ml)  1,000 mls @ 0 mls/hr IV .Q0M PRN; 

Protocol


   PRN Reason: Hypoglycemia Protocol


Metronidazole (Flagyl)  500 mg in 100 mls @ 100 mls/hr IVPB Q8 [facility]; Protocol


   Last Admin: 02/15/19 05:20 Dose:  100 mls/hr


Milrinone Lactate/Dextrose (Primacor 20mg/100ml D5w)  100 mls @ 8.93 mls/hr IV 

.[id number] PRN; Protocol


   PRN Reason: TITRATE PER MD ORDER


   Last Admin: 02/15/19 05:21 Dose:  0.375 mcg/kg/min, 8.93 mls/hr


Insulin Human Regular (Humulin R Med)  0 units SC ACHS [facility]; Protocol


   Last Admin: 02/14/19 21:46 Dose:  Not Given


Levalbuterol HCl (Xopenex)  0.63 mg IH [id number] PRN


   PRN Reason: Shortness of Breath


Levalbuterol HCl (Xopenex)  0.63 mg IH Q8 [facility]


   Last Admin: 02/15/19 07:32 Dose:  Not Given


Levothyroxine Sodium (Synthroid)  25 mcg PO 0600 [facility]


   Last Admin: 02/15/19 05:23 Dose:  25 mcg


Magnesium Oxide (Mag-Ox)  400 mg PO BID [facility]


   Last Admin: 02/14/19 17:11 Dose:  400 mg


Ondansetron HCl (Zofran Inj)  4 mg IVP Q4H PRN


   PRN Reason: Nausea/Vomiting


Pantoprazole Sodium (Protonix Inj)  40 mg IVP Q12 [facility]


   Last Admin: 02/14/19 21:36 Dose:  40 mg


Potassium Chloride (Klor-Con 10)  10 meq PO BRK [facility]


Ramipril (Altace)  10 mg PO DAILY [facility]


   Last Admin: 02/14/19 11:32 Dose:  10 mg


Tramadol HCl (Ultram)  50 mg PO Q6 PRN


   PRN Reason: Pain, moderate (4-7)











- Labs


Labs: 


                                        





                                 02/15/19 05:40 





                                 02/15/19 05:40 





                                        











PT  17.2 SECONDS (9.4-12.5)  H  02/13/19  21:45    


 


INR  1.55   02/13/19  21:45    


 


APTT  37.8 Seconds (26.9-38.3)   02/13/19  21:45    














- Constitutional


Appears: No Acute Distress





- Head Exam


Head Exam: ATRAUMATIC, NORMAL INSPECTION, NORMOCEPHALIC





- Eye Exam


Eye Exam: EOMI, Normal appearance, PERRL.  absent: Scleral icterus


Pupil Exam: NORMAL ACCOMODATION





- ENT Exam


ENT Exam: Mucous Membranes Moist





- Neck Exam


Additional comments: 





supple





- Respiratory Exam


Respiratory Exam: Decreased Breath Sounds (b/l lung bases), Clear to Ausculation

Bilateral, NORMAL BREATHING PATTERN.  absent: Rales, Rhonchi, Wheezes





- Cardiovascular Exam


Cardiovascular Exam: REGULAR RHYTHM, +S1, +S2





- GI/Abdominal Exam


GI & Abdominal Exam: Soft, Normal Bowel Sounds.  absent: Tenderness





- Extremities Exam


Extremities Exam: Normal Capillary Refill.  absent: Calf Tenderness





- Back Exam


Back Exam: absent: CVA tenderness (L), CVA tenderness (R), paraspinal tenderness





- Neurological Exam


Neurological Exam: Alert, Awake





- Psychiatric Exam


Psychiatric exam: Normal Affect, Normal Mood





- Skin


Skin Exam: Dry, Warm





Assessment and Plan





- Assessment and Plan (Free Text)


Plan: 





Mr Galindo, 85M, with PMHx CAD s/p CABG in 1990 on ASA/Plavix, HTN/HLD, DM2 (A1C 7

.1), asthma c/o Chest pain onset at rest associated with nausea, vomiting, 

diaphoresis. Last bowel movement 6 days ago. He was found to have NSTEMI. Cath 

(2/13) revealed EF 20-25. Triple vessel disease (1) SMYTH to LAD patent. (2) L 

Cir closed (3) RCA 99% block (4) bypass graft to RCA close. He receive PCI with 

3 bare metal stent in RCA and started milrinone gtt. Overnight, he was found to 

have BRBPR. Colonoscopy (2/15) showed several non-bleeding diverticulosis and 

angiodysplasia. 





NSTEMI, Unstable angina


Chest Pain - resolved


Triple vessel disease s/p CABG, s/p PCI with  3 bare metal stents


- ASA/Plavix/lipitor/coreg/ramipril. zofran prn


- Milrinone gtt


- Per cardio, no need for long term milrinone, no need for long term access for 

now


- No need for life vest for now





CHF acute on chronic


Pleural effusion b/l (L > R)


Hx CABG


- lasix 40 iv QD





Aspiration pnemonitis vs CAP


Hx Asthma


- Procalc is low 0.06


- Rocephin, doxy, flagyl (day 4)


- Mucomyst/Xopenex [facility]





Had ruled out UTI - Antibiotics covers possible UTI; urine cx negative


Constipation - Colace TID, Dulcolax suppository. Surgery had ruled out 

obstruction or bowel incarceration. no plan for surgery


Dysphagia - [ ] Pending swallow study/barium swallow. Aspiration precaution. 

pepsid





Anemia - stable. work up outpatient


DM, A1C 7.1 - ISSS


hypothyrodism - synthroid 


Pain - tramadol PRN





Imaging:


CT abd/pelvis (2/12): There is a right-sided inguinal hernia that contains a 

loop of bowel. This measures 3.4 x 5.2 cm. There is no associated obstruction.


CT chest (2/12): ADILSON infiltrate, patchy





POA: Mr Geovanni Degroot [place]ph[place] [phone]


Dispo: [  ] PT ariadne





s/r/d/w Dr Harris

## 2019-02-15 NOTE — CP.[email or web address]
<Carter Clifton - Last Filed: 02/15/19 11:38>





CCU Subjective





- Physician Review


Subjective (Free Text): 





CRITICAL CARE PROGRESS NOTE FOR DR. JUANA Clifton PGY1





Pt seen and examined at bedside in ICU today after return from endoscopy suite 

post-colonoscopy. He is somnolent post-procedure. He is arousable, however does 

not provide ROS upon interview. 











CCU Objective





- Vital Signs / Intake & Output


Vital Signs (Last 4 hours): 


Vital Signs











  Pulse Ox


 


 02/15/19 08:11  100











Intake and Output (Last 8hrs): 


                                 Intake & Output











 02/14/19 02/15/19 02/15/19





 22:59 06:59 14:59


 


Intake Total 1785 390 


 


Output Total 500 750 


 


Balance 1285 -360 


 


Weight  63.548 kg 


 


Intake:   


 


  IV 5 390 


 


    Left Antecubital  300 


 


    Right Forearm  90 


 


  Oral 480  


 


  Blood Product 650  


 


    Red Blood Cells Cpd As1 325  





    Lr  Unit [id number]   


 


  Other 650  


 


Output:   


 


  Urine 500 750 


 


    2-way Urethral  750 


 


    Urine, Voided 500  


 


Other:   


 


  # Bowel Movements 5 4 














- Physical Exam


Head: Positive for: Atraumatic, Normocephalic


Pupils: Positive for: PERRL


Extroacular Muscles: Positive for: EOMI


Conjunctiva: Positive for: Normal


Mouth: Positive for: Moist Mucous Membranes


Neck: Positive for: Normal Range of Motion


Respiratory/Chest: Positive for: Clear to Auscultation, Good Air Exchange.  

Negative for: Respiratory Distress, Accessory Muscle Use


Cardiovascular: Positive for: Regular Rate and Rhythm, Normal S1, S2.  Negative 

for: Murmurs


Abdomen: Positive for: Tenderness (LLQ tenderness), Distention.  Negative for: 

Rebound, Guarding


Back: Positive for: Normal Inspection


Upper Extremity: Positive for: Normal Inspection.  Negative for: Cyanosis, Edema


Lower Extremity: Positive for: Edema (Pitting edema)


Neurological: Positive for: GCS=15, Speech Normal


Skin: Positive for: Warm, Dry, Normal Color.  Negative for: Rashes


Psychiatric: Positive for: Alert, Oriented x 3, Normal Insight, Normal 

Concentration





- Medications


Active Medications: 


Active Medications











Generic Name Dose Route Start Last Admin





  Trade Name Freq  PRN Reason Stop Dose Admin


 


Acetaminophen  650 mg  02/12/19 11:06  





  Tylenol 325mg Tab  PO   





  Q6 PRN   





  TEMP>=99.5F   





     





     





     


 


Acetaminophen  650 mg  02/12/19 11:06  





  Tylenol 650 Mg Supp  RC   





  Q6H PRN   





  TEMP>=99.5F   





     





     





     


 


Acetylcysteine  4 ml  02/12/19 09:30  02/15/19 07:32





  Acetylcysteine 20%  IH   Not Given





  Q8 GRISELDA   





     





     





     





     


 


Aspirin  81 mg  02/16/19 10:00  





  Aspirin Chewable  PO   





  DAILY GRISELDA   





     





     





     





     


 


Atorvastatin Calcium  40 mg  02/12/19 17:00  02/14/19 16:50





  Lipitor  PO   Not Given





  DIN GRISELDA   





     





     





     





     


 


Carvedilol  12.5 mg  02/12/19 10:00  02/14/19 11:11





  Coreg  PO   12.5 mg





  BID GRISELDA   Administration





     





     





     





     


 


Dextrose  0 ml  02/12/19 11:06  02/14/19 21:52





  Dextrose 50% Inj  IV   50 ml





  STAT PRN   Administration





  Hypoglycemia Protocol   





     





  Protocol   





     


 


Docusate Sodium  100 mg  02/12/19 14:00  02/14/19 16:43





  Colace  PO   Not Given





  TID GRISELDA   





     





     





     





     


 


Famotidine  40 mg  02/12/19 22:00  02/14/19 21:36





  Pepcid  PO   40 mg





  HS GRISELDA   Administration





     





     





     





     


 


Ferrous Sulfate  324 mg  02/15/19 10:00  





  Feosol  PO   





  TID GRISELDA   





     





     





     





     


 


Furosemide  40 mg  02/14/19 10:00  02/14/19 11:12





  Lasix  IVP   40 mg





  DAILY GRISEDLA   Administration





     





     





     





     


 


Doxycycline Hyclate 100 mg/  100 mls @ 100 mls/hr  02/12/19 10:00  02/14/19 

21:36





  Sodium Chloride  IVPB   100 mls/hr





  Q12 GRISELDA   Administration





     





     





  Protocol   





     


 


Ceftriaxone Sodium  1 gm in 100 mls @ 100 mls/hr  02/12/19 10:00  02/14/19 11:25





  Rocephin 1 Gram Ivpb  IVPB   100 mls/hr





  DAILY GRISELDA   Administration





     





     





  Protocol   





     


 


Dextrose  1,000 mls @ 0 mls/hr  02/12/19 11:06  





  Dextrose 5% In Water 1000 Ml  IV   





  .Q0M PRN   





  Hypoglycemia Protocol   





     





  Protocol   





  Per Protocol   


 


Metronidazole  500 mg in 100 mls @ 100 mls/hr  02/12/19 14:00  02/15/19 05:20





  Flagyl  IVPB   100 mls/hr





  Q8 GRISELDA   Administration





     





     





  Protocol   





     


 


Milrinone Lactate/Dextrose  100 mls @ 8.93 mls/hr  02/13/19 14:33  02/15/19 

05:21





  Primacor 20mg/100ml D5w  IV   0.375 mcg/kg/min





  .[id number] PRN   8.93 mls/hr





  TITRATE PER MD ORDER   Administration





     





  Protocol   





  0.375 MCG/KG/MIN   


 


Insulin Human Regular  0 units  02/12/19 07:30  02/14/19 21:46





  Humulin R Med  SC   Not Given





  [facility]   





     





     





  Protocol   





     


 


Levalbuterol HCl  0.63 mg  02/11/19 23:24  





  Xopenex  IH   





  [id number] PRN   





  Shortness of Breath   





     





     





     


 


Levalbuterol HCl  0.63 mg  02/12/19 09:30  02/15/19 07:32





  Xopenex  IH   Not Given





  Q8 GRISELDA   





     





     





     





     


 


Levothyroxine Sodium  25 mcg  02/12/19 06:00  02/15/19 05:23





  Synthroid  PO   25 mcg





  0600 GRISELDA   Administration





     





     





     





     


 


Magnesium Oxide  400 mg  02/14/19 10:00  02/14/19 17:11





  Mag-Ox  PO   400 mg





  BID GRISELDA   Administration





     





     





     





     


 


Ondansetron HCl  4 mg  02/12/19 11:06  





  Zofran Inj  IVP   





  Q4H PRN   





  Nausea/Vomiting   





     





     





     


 


Pantoprazole Sodium  40 mg  02/14/19 10:00  02/14/19 21:36





  Protonix Inj  IVP   40 mg





  Q12 GRISELDA   Administration





     





     





     





     


 


Potassium Chloride  10 meq  02/15/19 08:00  





  Klor-Con 10  PO   





  BRK GRISELDA   





     





     





     





     


 


Ramipril  10 mg  02/12/19 10:00  02/14/19 11:32





  Altace  PO   10 mg





  DAILY GRISELDA   Administration





     





     





     





     


 


Tramadol HCl  50 mg  02/11/19 23:39  





  Ultram  PO   





  Q6 PRN   





  Pain, moderate (4-7)   





     





     





     














- Patient Studies


Lab Studies: 


                              Microbiology Studies











 02/12/19 10:30 Blood Culture - Preliminary





 Blood-Venous    NO GROWTH AFTER 48 HOURS


 


 02/12/19 10:00 Blood Culture - Preliminary





 Blood-Venous    NO GROWTH AFTER 48 HOURS








                                   Lab Studies











  02/15/19 02/15/19 02/15/19 Range/Units





  10:00 07:06 05:40 


 


WBC    4.3 L  (4.5-11.0)  10^3/uL


 


RBC    3.87  (3.5-6.1)  10^6/uL


 


Hgb    11.9 L  (14.0-18.0)  g/dL


 


Hct    36.0 L  (42.0-52.0)  %


 


MCV    93.0  (80.0-105.0)  fl


 


MCH    30.7  (25.0-35.0)  pg


 


MCHC    33.1  (31.0-37.0)  g/dl


 


RDW    14.8 H  (11.5-14.5)  %


 


Plt Count    123  (120.0-450.0)  10^3/uL


 


MPV    10.0  (7.0-11.0)  fl


 


Neut % (Auto)    71.2 H  (50.0-68.0)  %


 


Lymph % (Auto)    15.7 L  (22.0-35.0)  %


 


Mono % (Auto)    8.3 H  (1.0-6.0)  %


 


Eos % (Auto)    4.6  (1.5-5.0)  %


 


Baso % (Auto)    0.2  (0.0-3.0)  %


 


Lymph # (Auto)    0.7 L  (1.2-3.4)  


 


Mono # (Auto)    0.4  (0.1-0.6)  


 


Eos # (Auto)    0.2  (0.0-0.7)  


 


Baso # (Auto)    0.01  (0.0-2.0)  K/mm3


 


Absolute Neuts (auto)    3.08  (1.4-6.5)  


 


PT  15.8 H    (9.4-12.5)  SECONDS


 


INR  1.40    


 


APTT  31.6    (26.9-38.3)  Seconds


 


Sodium     (132-148)  mmol/L


 


Potassium     (3.6-5.0)  mmol/L


 


Chloride     ()  mmol/L


 


Carbon Dioxide     (21-33)  mmol/L


 


Anion Gap     (10-20)  


 


BUN     (7-21)  mg/dL


 


Creatinine     (0.8-1.5)  mg/dl


 


Est GFR (African Amer)     


 


Est GFR (Non-Af Amer)     


 


POC Glucose (mg/dL)   117 H   ()  mg/dL


 


Random Glucose     ()  mg/dL


 


Calcium     (8.4-10.5)  mg/dL


 


Phosphorus     (2.5-4.5)  mg/dL


 


Magnesium     (1.7-2.2)  mg/dL


 


Total Bilirubin     (0.2-1.3)  mg/dL


 


Direct Bilirubin     (0.0-0.4)  mg/dL


 


AST     (17-59)  U/L


 


ALT     (7-56)  U/L


 


Alkaline Phosphatase     ()  U/L


 


NT-Pro-B Natriuret Pep     (0-450)  pg/mL


 


Total Protein     (5.8-8.3)  g/dL


 


Albumin     (3.0-4.8)  g/dL


 


Globulin     gm/dL


 


Albumin/Globulin Ratio     (1.1-1.8)  


 


Mycoplasma pneumon IgG     (<=0.90)  


 


Mycoplasma pneumon IgM     (<770)  U/mL


 


Ur Strep pneumoniae Ag     (Not Detected)  


 


Blood Type     


 


Antibody Screen     


 


Crossmatch     


 


BBK History Checked     














  02/15/19 02/14/19 02/14/19 Range/Units





  05:40 23:55 22:43 


 


WBC     (4.5-11.0)  10^3/uL


 


RBC     (3.5-6.1)  10^6/uL


 


Hgb   11.6 L   (14.0-18.0)  g/dL


 


Hct   35.4 L   (42.0-52.0)  %


 


MCV     (80.0-105.0)  fl


 


MCH     (25.0-35.0)  pg


 


MCHC     (31.0-37.0)  g/dl


 


RDW     (11.5-14.5)  %


 


Plt Count     (120.0-450.0)  10^3/uL


 


MPV     (7.0-11.0)  fl


 


Neut % (Auto)     (50.0-68.0)  %


 


Lymph % (Auto)     (22.0-35.0)  %


 


Mono % (Auto)     (1.0-6.0)  %


 


Eos % (Auto)     (1.5-5.0)  %


 


Baso % (Auto)     (0.0-3.0)  %


 


Lymph # (Auto)     (1.2-3.4)  


 


Mono # (Auto)     (0.1-0.6)  


 


Eos # (Auto)     (0.0-0.7)  


 


Baso # (Auto)     (0.0-2.0)  K/mm3


 


Absolute Neuts (auto)     (1.4-6.5)  


 


PT     (9.4-12.5)  SECONDS


 


INR     


 


APTT     (26.9-38.3)  Seconds


 


Sodium  144    (132-148)  mmol/L


 


Potassium  3.5 L    (3.6-5.0)  mmol/L


 


Chloride  110 H    ()  mmol/L


 


Carbon Dioxide  27    (21-33)  mmol/L


 


Anion Gap  11    (10-20)  


 


BUN  21    (7-21)  mg/dL


 


Creatinine  1.2    (0.8-1.5)  mg/dl


 


Est GFR (African Amer)  > 60    


 


Est GFR (Non-Af Amer)  58    


 


POC Glucose (mg/dL)    139 H  ()  mg/dL


 


Random Glucose  119 H    ()  mg/dL


 


Calcium  8.4    (8.4-10.5)  mg/dL


 


Phosphorus  3.1    (2.5-4.5)  mg/dL


 


Magnesium  2.7 H    (1.7-2.2)  mg/dL


 


Total Bilirubin  0.8    (0.2-1.3)  mg/dL


 


Direct Bilirubin  0.4    (0.0-0.4)  mg/dL


 


AST  23    (17-59)  U/L


 


ALT  23    (7-56)  U/L


 


Alkaline Phosphatase  55    ()  U/L


 


NT-Pro-B Natriuret Pep  2330 H    (0-450)  pg/mL


 


Total Protein  5.5 L    (5.8-8.3)  g/dL


 


Albumin  2.9 L    (3.0-4.8)  g/dL


 


Globulin  2.6    gm/dL


 


Albumin/Globulin Ratio  1.1    (1.1-1.8)  


 


Mycoplasma pneumon IgG     (<=0.90)  


 


Mycoplasma pneumon IgM     (<770)  U/mL


 


Ur Strep pneumoniae Ag     (Not Detected)  


 


Blood Type     


 


Antibody Screen     


 


Crossmatch     


 


BBK History Checked     














  02/14/19 02/14/19 02/14/19 Range/Units





  21:39 19:11 16:03 


 


WBC     (4.5-11.0)  10^3/uL


 


RBC     (3.5-6.1)  10^6/uL


 


Hgb   12.1 L   (14.0-18.0)  g/dL


 


Hct   36.2 L   (42.0-52.0)  %


 


MCV     (80.0-105.0)  fl


 


MCH     (25.0-35.0)  pg


 


MCHC     (31.0-37.0)  g/dl


 


RDW     (11.5-14.5)  %


 


Plt Count     (120.0-450.0)  10^3/uL


 


MPV     (7.0-11.0)  fl


 


Neut % (Auto)     (50.0-68.0)  %


 


Lymph % (Auto)     (22.0-35.0)  %


 


Mono % (Auto)     (1.0-6.0)  %


 


Eos % (Auto)     (1.5-5.0)  %


 


Baso % (Auto)     (0.0-3.0)  %


 


Lymph # (Auto)     (1.2-3.4)  


 


Mono # (Auto)     (0.1-0.6)  


 


Eos # (Auto)     (0.0-0.7)  


 


Baso # (Auto)     (0.0-2.0)  K/mm3


 


Absolute Neuts (auto)     (1.4-6.5)  


 


PT     (9.4-12.5)  SECONDS


 


INR     


 


APTT     (26.9-38.3)  Seconds


 


Sodium     (132-148)  mmol/L


 


Potassium     (3.6-5.0)  mmol/L


 


Chloride     ()  mmol/L


 


Carbon Dioxide     (21-33)  mmol/L


 


Anion Gap     (10-20)  


 


BUN     (7-21)  mg/dL


 


Creatinine     (0.8-1.5)  mg/dl


 


Est GFR (African Amer)     


 


Est GFR (Non-Af Amer)     


 


POC Glucose (mg/dL)  57 L   211 H  ()  mg/dL


 


Random Glucose     ()  mg/dL


 


Calcium     (8.4-10.5)  mg/dL


 


Phosphorus     (2.5-4.5)  mg/dL


 


Magnesium     (1.7-2.2)  mg/dL


 


Total Bilirubin     (0.2-1.3)  mg/dL


 


Direct Bilirubin     (0.0-0.4)  mg/dL


 


AST     (17-59)  U/L


 


ALT     (7-56)  U/L


 


Alkaline Phosphatase     ()  U/L


 


NT-Pro-B Natriuret Pep     (0-450)  pg/mL


 


Total Protein     (5.8-8.3)  g/dL


 


Albumin     (3.0-4.8)  g/dL


 


Globulin     gm/dL


 


Albumin/Globulin Ratio     (1.1-1.8)  


 


Mycoplasma pneumon IgG     (<=0.90)  


 


Mycoplasma pneumon IgM     (<770)  U/mL


 


Ur Strep pneumoniae Ag     (Not Detected)  


 


Blood Type     


 


Antibody Screen     


 


Crossmatch     


 


BBK History Checked     














  02/14/19 02/14/19 02/13/19 Range/Units





  11:20 11:10 21:45 


 


WBC     (4.5-11.0)  10^3/uL


 


RBC     (3.5-6.1)  10^6/uL


 


Hgb   10.9 L   (14.0-18.0)  g/dL


 


Hct   33.0 L   (42.0-52.0)  %


 


MCV     (80.0-105.0)  fl


 


MCH     (25.0-35.0)  pg


 


MCHC     (31.0-37.0)  g/dl


 


RDW     (11.5-14.5)  %


 


Plt Count     (120.0-450.0)  10^3/uL


 


MPV     (7.0-11.0)  fl


 


Neut % (Auto)     (50.0-68.0)  %


 


Lymph % (Auto)     (22.0-35.0)  %


 


Mono % (Auto)     (1.0-6.0)  %


 


Eos % (Auto)     (1.5-5.0)  %


 


Baso % (Auto)     (0.0-3.0)  %


 


Lymph # (Auto)     (1.2-3.4)  


 


Mono # (Auto)     (0.1-0.6)  


 


Eos # (Auto)     (0.0-0.7)  


 


Baso # (Auto)     (0.0-2.0)  K/mm3


 


Absolute Neuts (auto)     (1.4-6.5)  


 


PT     (9.4-12.5)  SECONDS


 


INR     


 


APTT     (26.9-38.3)  Seconds


 


Sodium     (132-148)  mmol/L


 


Potassium     (3.6-5.0)  mmol/L


 


Chloride     ()  mmol/L


 


Carbon Dioxide     (21-33)  mmol/L


 


Anion Gap     (10-20)  


 


BUN     (7-21)  mg/dL


 


Creatinine     (0.8-1.5)  mg/dl


 


Est GFR (African Amer)     


 


Est GFR (Non-Af Amer)     


 


POC Glucose (mg/dL)  165 H    ()  mg/dL


 


Random Glucose     ()  mg/dL


 


Calcium     (8.4-10.5)  mg/dL


 


Phosphorus     (2.5-4.5)  mg/dL


 


Magnesium     (1.7-2.2)  mg/dL


 


Total Bilirubin     (0.2-1.3)  mg/dL


 


Direct Bilirubin     (0.0-0.4)  mg/dL


 


AST     (17-59)  U/L


 


ALT     (7-56)  U/L


 


Alkaline Phosphatase     ()  U/L


 


NT-Pro-B Natriuret Pep     (0-450)  pg/mL


 


Total Protein     (5.8-8.3)  g/dL


 


Albumin     (3.0-4.8)  g/dL


 


Globulin     gm/dL


 


Albumin/Globulin Ratio     (1.1-1.8)  


 


Mycoplasma pneumon IgG     (<=0.90)  


 


Mycoplasma pneumon IgM     (<770)  U/mL


 


Ur Strep pneumoniae Ag     (Not Detected)  


 


Blood Type    B POSITIVE  


 


Antibody Screen    Negative  


 


Crossmatch    See Detail  


 


BBK History Checked    No verified bt  














  02/12/19 02/12/19 Range/Units





  13:30 10:30 


 


WBC    (4.5-11.0)  10^3/uL


 


RBC    (3.5-6.1)  10^6/uL


 


Hgb    (14.0-18.0)  g/dL


 


Hct    (42.0-52.0)  %


 


MCV    (80.0-105.0)  fl


 


MCH    (25.0-35.0)  pg


 


MCHC    (31.0-37.0)  g/dl


 


RDW    (11.5-14.5)  %


 


Plt Count    (120.0-450.0)  10^3/uL


 


MPV    (7.0-11.0)  fl


 


Neut % (Auto)    (50.0-68.0)  %


 


Lymph % (Auto)    (22.0-35.0)  %


 


Mono % (Auto)    (1.0-6.0)  %


 


Eos % (Auto)    (1.5-5.0)  %


 


Baso % (Auto)    (0.0-3.0)  %


 


Lymph # (Auto)    (1.2-3.4)  


 


Mono # (Auto)    (0.1-0.6)  


 


Eos # (Auto)    (0.0-0.7)  


 


Baso # (Auto)    (0.0-2.0)  K/mm3


 


Absolute Neuts (auto)    (1.4-6.5)  


 


PT    (9.4-12.5)  SECONDS


 


INR    


 


APTT    (26.9-38.3)  Seconds


 


Sodium    (132-148)  mmol/L


 


Potassium    (3.6-5.0)  mmol/L


 


Chloride    ()  mmol/L


 


Carbon Dioxide    (21-33)  mmol/L


 


Anion Gap    (10-20)  


 


BUN    (7-21)  mg/dL


 


Creatinine    (0.8-1.5)  mg/dl


 


Est GFR (African Amer)    


 


Est GFR (Non-Af Amer)    


 


POC Glucose (mg/dL)    ()  mg/dL


 


Random Glucose    ()  mg/dL


 


Calcium    (8.4-10.5)  mg/dL


 


Phosphorus    (2.5-4.5)  mg/dL


 


Magnesium    (1.7-2.2)  mg/dL


 


Total Bilirubin    (0.2-1.3)  mg/dL


 


Direct Bilirubin    (0.0-0.4)  mg/dL


 


AST    (17-59)  U/L


 


ALT    (7-56)  U/L


 


Alkaline Phosphatase    ()  U/L


 


NT-Pro-B Natriuret Pep    (0-450)  pg/mL


 


Total Protein    (5.8-8.3)  g/dL


 


Albumin    (3.0-4.8)  g/dL


 


Globulin    gm/dL


 


Albumin/Globulin Ratio    (1.1-1.8)  


 


Mycoplasma pneumon IgG   >5.00 H  (<=0.90)  


 


Mycoplasma pneumon IgM   230  (<770)  U/mL


 


Ur Strep pneumoniae Ag  Not detected   (Not Detected)  


 


Blood Type    


 


Antibody Screen    


 


Crossmatch    


 


BBK History Checked    








                         Laboratory Results - last 24 hr











  02/12/19 02/12/19 02/13/19





  10:30 13:30 21:45


 


WBC   


 


RBC   


 


Hgb   


 


Hct   


 


MCV   


 


MCH   


 


MCHC   


 


RDW   


 


Plt Count   


 


MPV   


 


Neut % (Auto)   


 


Lymph % (Auto)   


 


Mono % (Auto)   


 


Eos % (Auto)   


 


Baso % (Auto)   


 


Lymph # (Auto)   


 


Mono # (Auto)   


 


Eos # (Auto)   


 


Baso # (Auto)   


 


Absolute Neuts (auto)   


 


PT   


 


INR   


 


APTT   


 


Sodium   


 


Potassium   


 


Chloride   


 


Carbon Dioxide   


 


Anion Gap   


 


BUN   


 


Creatinine   


 


Est GFR ( Amer)   


 


Est GFR (Non-Af Amer)   


 


POC Glucose (mg/dL)   


 


Random Glucose   


 


Calcium   


 


Phosphorus   


 


Magnesium   


 


Total Bilirubin   


 


Direct Bilirubin   


 


AST   


 


ALT   


 


Alkaline Phosphatase   


 


NT-Pro-B Natriuret Pep   


 


Total Protein   


 


Albumin   


 


Globulin   


 


Albumin/Globulin Ratio   


 


Mycoplasma pneumon IgG  >5.00 H  


 


Mycoplasma pneumon IgM  230  


 


Ur Strep pneumoniae Ag   Not detected 


 


Blood Type    B POSITIVE


 


Antibody Screen    Negative


 


Crossmatch    See Detail


 


BBK History Checked    No verified bt














  02/14/19 02/14/19 02/14/19





  11:10 11:20 16:03


 


WBC   


 


RBC   


 


Hgb  10.9 L  


 


Hct  33.0 L  


 


MCV   


 


MCH   


 


MCHC   


 


RDW   


 


Plt Count   


 


MPV   


 


Neut % (Auto)   


 


Lymph % (Auto)   


 


Mono % (Auto)   


 


Eos % (Auto)   


 


Baso % (Auto)   


 


Lymph # (Auto)   


 


Mono # (Auto)   


 


Eos # (Auto)   


 


Baso # (Auto)   


 


Absolute Neuts (auto)   


 


PT   


 


INR   


 


APTT   


 


Sodium   


 


Potassium   


 


Chloride   


 


Carbon Dioxide   


 


Anion Gap   


 


BUN   


 


Creatinine   


 


Est GFR ( Amer)   


 


Est GFR (Non-Af Amer)   


 


POC Glucose (mg/dL)   165 H  211 H


 


Random Glucose   


 


Calcium   


 


Phosphorus   


 


Magnesium   


 


Total Bilirubin   


 


Direct Bilirubin   


 


AST   


 


ALT   


 


Alkaline Phosphatase   


 


NT-Pro-B Natriuret Pep   


 


Total Protein   


 


Albumin   


 


Globulin   


 


Albumin/Globulin Ratio   


 


Mycoplasma pneumon IgG   


 


Mycoplasma pneumon IgM   


 


Ur Strep pneumoniae Ag   


 


Blood Type   


 


Antibody Screen   


 


Crossmatch   


 


BBK History Checked   














  02/14/19 02/14/19 02/14/19





  19:11 21:39 22:43


 


WBC   


 


RBC   


 


Hgb  12.1 L  


 


Hct  36.2 L  


 


MCV   


 


MCH   


 


MCHC   


 


RDW   


 


Plt Count   


 


MPV   


 


Neut % (Auto)   


 


Lymph % (Auto)   


 


Mono % (Auto)   


 


Eos % (Auto)   


 


Baso % (Auto)   


 


Lymph # (Auto)   


 


Mono # (Auto)   


 


Eos # (Auto)   


 


Baso # (Auto)   


 


Absolute Neuts (auto)   


 


PT   


 


INR   


 


APTT   


 


Sodium   


 


Potassium   


 


Chloride   


 


Carbon Dioxide   


 


Anion Gap   


 


BUN   


 


Creatinine   


 


Est GFR ( Amer)   


 


Est GFR (Non-Af Amer)   


 


POC Glucose (mg/dL)   57 L  139 H


 


Random Glucose   


 


Calcium   


 


Phosphorus   


 


Magnesium   


 


Total Bilirubin   


 


Direct Bilirubin   


 


AST   


 


ALT   


 


Alkaline Phosphatase   


 


NT-Pro-B Natriuret Pep   


 


Total Protein   


 


Albumin   


 


Globulin   


 


Albumin/Globulin Ratio   


 


Mycoplasma pneumon IgG   


 


Mycoplasma pneumon IgM   


 


Ur Strep pneumoniae Ag   


 


Blood Type   


 


Antibody Screen   


 


Crossmatch   


 


BBK History Checked   














  02/14/19 02/15/19 02/15/19





  23:55 05:40 05:40


 


WBC    4.3 L


 


RBC    3.87


 


Hgb  11.6 L   11.9 L


 


Hct  35.4 L   36.0 L


 


MCV    93.0


 


MCH    30.7


 


MCHC    33.1


 


RDW    14.8 H


 


Plt Count    123


 


MPV    10.0


 


Neut % (Auto)    71.2 H


 


Lymph % (Auto)    15.7 L


 


Mono % (Auto)    8.3 H


 


Eos % (Auto)    4.6


 


Baso % (Auto)    0.2


 


Lymph # (Auto)    0.7 L


 


Mono # (Auto)    0.4


 


Eos # (Auto)    0.2


 


Baso # (Auto)    0.01


 


Absolute Neuts (auto)    3.08


 


PT   


 


INR   


 


APTT   


 


Sodium   144 


 


Potassium   3.5 L 


 


Chloride   110 H 


 


Carbon Dioxide   27 


 


Anion Gap   11 


 


BUN   21 


 


Creatinine   1.2 


 


Est GFR ( Amer)   > 60 


 


Est GFR (Non-Af Amer)   58 


 


POC Glucose (mg/dL)   


 


Random Glucose   119 H 


 


Calcium   8.4 


 


Phosphorus   3.1 


 


Magnesium   2.7 H 


 


Total Bilirubin   0.8 


 


Direct Bilirubin   0.4 


 


AST   23 


 


ALT   23 


 


Alkaline Phosphatase   55 


 


NT-Pro-B Natriuret Pep   2330 H 


 


Total Protein   5.5 L 


 


Albumin   2.9 L 


 


Globulin   2.6 


 


Albumin/Globulin Ratio   1.1 


 


Mycoplasma pneumon IgG   


 


Mycoplasma pneumon IgM   


 


Ur Strep pneumoniae Ag   


 


Blood Type   


 


Antibody Screen   


 


Crossmatch   


 


BBK History Checked   














  02/15/19 02/15/19





  07:06 10:00


 


WBC  


 


RBC  


 


Hgb  


 


Hct  


 


MCV  


 


MCH  


 


MCHC  


 


RDW  


 


Plt Count  


 


MPV  


 


Neut % (Auto)  


 


Lymph % (Auto)  


 


Mono % (Auto)  


 


Eos % (Auto)  


 


Baso % (Auto)  


 


Lymph # (Auto)  


 


Mono # (Auto)  


 


Eos # (Auto)  


 


Baso # (Auto)  


 


Absolute Neuts (auto)  


 


PT   15.8 H


 


INR   1.40


 


APTT   31.6


 


Sodium  


 


Potassium  


 


Chloride  


 


Carbon Dioxide  


 


Anion Gap  


 


BUN  


 


Creatinine  


 


Est GFR ( Amer)  


 


Est GFR (Non-Af Amer)  


 


POC Glucose (mg/dL)  117 H 


 


Random Glucose  


 


Calcium  


 


Phosphorus  


 


Magnesium  


 


Total Bilirubin  


 


Direct Bilirubin  


 


AST  


 


ALT  


 


Alkaline Phosphatase  


 


NT-Pro-B Natriuret Pep  


 


Total Protein  


 


Albumin  


 


Globulin  


 


Albumin/Globulin Ratio  


 


Mycoplasma pneumon IgG  


 


Mycoplasma pneumon IgM  


 


Ur Strep pneumoniae Ag  


 


Blood Type  


 


Antibody Screen  


 


Crossmatch  


 


BBK History Checked  











Radiology Impressions: 


                              Radiology Impressions





GI Bleed Scan Nuclear Medicine  02/13/19 21:28


IMPRESSION:


Positive examination for active gastrointestinal bleeding.  The 


origin of the bleeding is in the rectosigmoid region. 


 


 











EKG/Cardiology Studies: 


Cardiology / EKG Studies





02/15/19 07:00


EKG [ELECTROCARDIOGRAM] DAILY 


   Comment: 


   Reason For Exam: ACS











Fingerstick Blood Sugar Results: 139





Review of Systems





- Review of Systems


Review of Systems: 





per HPI





Critical Care Progress Note





- Nutrition


Nutrition: 


                                    Nutrition











 Category Date Time Status


 


 Liquid Diet [DIET] Diets  02/15/19 Lunch Ordered


 


 NPO Diet [DIET] Diets  02/14/19 Breakfast Ordered














Assessment/Plan





- Assessment and Plan (Free Text)


Assessment: 





84 yo M with PMH of HTN, CAD s/p CABG, T2DM, HLD, and asthma admitted to [facility] for

 NSTEMI and underwent cardiac catherization with 3 BMS placed in the mid and 

proximal RCA. Patient is transferred to the ICU due to active GI bleed. Patient 

currently hemodynamically stable, will continue to monitor.





Plan: 





Neuro


AOx3


Maintain normothermia





CV


HFrEF


EF noted to be 20-25% during cath


continue lasix 


CAD s/p stents


Currently holding ACEi & b-blocker d/t hypotension


Hold DAPT due to GI bleed. Resume ASA 2/16, resume plavix 2/17 & 2/18


Pulmonary HTN


Milrinone gtt


Maintain MAP > 65


Cardiology following





Pulm


Maintain O2 sat > 92%





GI


Blood per rectum


s/p colonoscopy revealing diverticula, internal hemorrhoids and AVMs with no 

active


Hold DAPT. Resume ASA 2/16, resume plavix 2/17 / 2/18 per GI recs


Protonix BID


Clear liquid diet





Renal


Replete K


Cont to monitor electrolytes, replete as needed


Maintain euvolemia





Heme


Blood per rectum


s/p 1u pRBC with goal Hgb >9


recheck coags


monitor BP





ID


ADILSON pneumonia


IV abx per primary





Endo


DM2


ISS-Low


Accuchecks


Maintain euglycemia





Case seen, examined and discussed with attending physician, Dr. Juana Clifton PGY1





<Jesus Arias - Last Filed: 02/15/19 18:20>





CCU Objective





- Vital Signs / Intake & Output


Intake and Output (Last 8hrs): 


                                 Intake & Output











 02/15/19 02/15/19 02/15/19





 06:59 14:59 22:59


 


Intake Total 390  


 


Output Total 750  


 


Balance -360  


 


Weight 63.548 kg  


 


Intake:   


 


    


 


    Left Antecubital 300  


 


    Right Forearm 90  


 


Output:   


 


  Urine 750  


 


    2-way Urethral 750  


 


Other:   


 


  # Bowel Movements 4  














- Medications


Active Medications: 


Active Medications











Generic Name Dose Route Start Last Admin





  Trade Name Freq  PRN Reason Stop Dose Admin


 


Acetaminophen  650 mg  02/12/19 11:06  





  Tylenol 325mg Tab  PO   





  Q6 PRN   





  TEMP>=99.5F   





     





     





     


 


Acetaminophen  650 mg  02/12/19 11:06  





  Tylenol 650 Mg Supp  RC   





  Q6H PRN   





  TEMP>=99.5F   





     





     





     


 


Acetylcysteine  4 ml  02/12/19 09:30  02/15/19 13:51





  Acetylcysteine 20%  IH   4 ml





  Q8 GRISELDA   Administration





     





     





     





     


 


Aspirin  81 mg  02/16/19 10:00  





  Aspirin Chewable  PO   





  DAILY GRISELDA   





     





     





     





     


 


Atorvastatin Calcium  40 mg  02/12/19 17:00  02/15/19 17:07





  Lipitor  PO   40 mg





  DIN GRISELDA   Administration





     





     





     





     


 


Carvedilol  12.5 mg  02/12/19 10:00  02/14/19 11:11





  Coreg  PO   12.5 mg





  BID GRISELDA   Administration





     





     





     





     


 


Dextrose  0 ml  02/12/19 11:06  02/14/19 21:52





  Dextrose 50% Inj  IV   50 ml





  STAT PRN   Administration





  Hypoglycemia Protocol   





     





  Protocol   





     


 


Docusate Sodium  100 mg  02/12/19 14:00  02/15/19 16:59





  Colace  PO   Not Given





  TID GRISELDA   





     





     





     





     


 


Famotidine  40 mg  02/12/19 22:00  02/14/19 21:36





  Pepcid  PO   40 mg





  HS GRISELDA   Administration





     





     





     





     


 


Ferrous Sulfate  324 mg  02/15/19 10:00  02/15/19 17:08





  Feosol  PO   324 mg





  TID GRISELDA   Administration





     





     





     





     


 


Furosemide  40 mg  02/14/19 10:00  02/15/19 10:28





  Lasix  IVP   40 mg





  DAILY GRISELDA   Administration





     





     





     





     


 


Doxycycline Hyclate 100 mg/  100 mls @ 100 mls/hr  02/12/19 10:00  02/15/19 

10:31





  Sodium Chloride  IVPB   100 mls/hr





  Q12 GRISELDA   Administration





     





     





  Protocol   





     


 


Ceftriaxone Sodium  1 gm in 100 mls @ 100 mls/hr  02/12/19 10:00  02/15/19 10:30





  Rocephin 1 Gram Ivpb  IVPB   100 mls/hr





  DAILY GRISELDA   Administration





     





     





  Protocol   





     


 


Dextrose  1,000 mls @ 0 mls/hr  02/12/19 11:06  





  Dextrose 5% In Water 1000 Ml  IV   





  .Q0M PRN   





  Hypoglycemia Protocol   





     





  Protocol   





  Per Protocol   


 


Metronidazole  500 mg in 100 mls @ 100 mls/hr  02/12/19 14:00  02/15/19 14:20





  Flagyl  IVPB   100 mls/hr





  Q8 GRISELDA   Administration





     





     





  Protocol   





     


 


Milrinone Lactate/Dextrose  100 mls @ 8.93 mls/hr  02/13/19 14:33  02/15/19 

05:21





  Primacor 20mg/100ml D5w  IV   0.375 mcg/kg/min





  .[id number] PRN   8.93 mls/hr





  TITRATE PER MD ORDER   Administration





     





  Protocol   





  0.375 MCG/KG/MIN   


 


Insulin Human Regular  0 units  02/15/19 11:30  02/15/19 17:01





  Humulin R Low  SC   7 u





  ACHS GRISELDA   Administration





     





     





  Protocol   





     


 


Levalbuterol HCl  0.63 mg  02/11/19 23:24  





  Xopenex  IH   





  [id number] PRN   





  Shortness of Breath   





     





     





     


 


Levalbuterol HCl  0.63 mg  02/12/19 09:30  02/15/19 13:51





  Xopenex  IH   0.63 mg





  Q8 GRISELDA   Administration





     





     





     





     


 


Levothyroxine Sodium  25 mcg  02/12/19 06:00  02/15/19 05:23





  Synthroid  PO   25 mcg





  0600 GRISELDA   Administration





     





     





     





     


 


Magnesium Oxide  400 mg  02/14/19 10:00  02/15/19 17:07





  Mag-Ox  PO   400 mg





  BID GRISELDA   Administration





     





     





     





     


 


Ondansetron HCl  4 mg  02/12/19 11:06  





  Zofran Inj  IVP   





  Q4H PRN   





  Nausea/Vomiting   





     





     





     


 


Pantoprazole Sodium  40 mg  02/14/19 10:00  02/15/19 10:30





  Protonix Inj  IVP   40 mg





  Q12 GRISELDA   Administration





     





     





     





     


 


Potassium Chloride  10 meq  02/15/19 08:00  02/15/19 10:28





  Klor-Con 10  PO   10 meq





  BRK GRISELDA   Administration





     





     





     





     


 


Ramipril  10 mg  02/12/19 10:00  02/14/19 11:32





  Altace  PO   10 mg





  DAILY GRISELDA   Administration





     





     





     





     


 


Tramadol HCl  50 mg  02/11/19 23:39  





  Ultram  PO   





  Q6 PRN   





  Pain, moderate (4-7)   





     





     





     














- Patient Studies


Lab Studies: 


                              Microbiology Studies











 02/12/19 10:30 Blood Culture - Preliminary





 Blood-Venous    NO GROWTH AFTER 3 DAYS


 


 02/12/19 10:00 Blood Culture - Preliminary





 Blood-Venous    NO GROWTH AFTER 3 DAYS








                                   Lab Studies











  02/15/19 02/15/19 02/15/19 Range/Units





  16:49 11:31 10:00 


 


WBC     (4.5-11.0)  10^3/uL


 


RBC     (3.5-6.1)  10^6/uL


 


Hgb     (14.0-18.0)  g/dL


 


Hct     (42.0-52.0)  %


 


MCV     (80.0-105.0)  fl


 


MCH     (25.0-35.0)  pg


 


MCHC     (31.0-37.0)  g/dl


 


RDW     (11.5-14.5)  %


 


Plt Count     (120.0-450.0)  10^3/uL


 


MPV     (7.0-11.0)  fl


 


Neut % (Auto)     (50.0-68.0)  %


 


Lymph % (Auto)     (22.0-35.0)  %


 


Mono % (Auto)     (1.0-6.0)  %


 


Eos % (Auto)     (1.5-5.0)  %


 


Baso % (Auto)     (0.0-3.0)  %


 


Lymph # (Auto)     (1.2-3.4)  


 


Mono # (Auto)     (0.1-0.6)  


 


Eos # (Auto)     (0.0-0.7)  


 


Baso # (Auto)     (0.0-2.0)  K/mm3


 


Absolute Neuts (auto)     (1.4-6.5)  


 


PT    15.8 H  (9.4-12.5)  SECONDS


 


INR    1.40  


 


APTT    31.6  (26.9-38.3)  Seconds


 


Sodium     (132-148)  mmol/L


 


Potassium     (3.6-5.0)  mmol/L


 


Chloride     ()  mmol/L


 


Carbon Dioxide     (21-33)  mmol/L


 


Anion Gap     (10-20)  


 


BUN     (7-21)  mg/dL


 


Creatinine     (0.8-1.5)  mg/dl


 


Est GFR (African Amer)     


 


Est GFR (Non-Af Amer)     


 


POC Glucose (mg/dL)  304 H  129 H   ()  mg/dL


 


Random Glucose     ()  mg/dL


 


Calcium     (8.4-10.5)  mg/dL


 


Phosphorus     (2.5-4.5)  mg/dL


 


Magnesium     (1.7-2.2)  mg/dL


 


Total Bilirubin     (0.2-1.3)  mg/dL


 


Direct Bilirubin     (0.0-0.4)  mg/dL


 


AST     (17-59)  U/L


 


ALT     (7-56)  U/L


 


Alkaline Phosphatase     ()  U/L


 


NT-Pro-B Natriuret Pep     (0-450)  pg/mL


 


Total Protein     (5.8-8.3)  g/dL


 


Albumin     (3.0-4.8)  g/dL


 


Globulin     gm/dL


 


Albumin/Globulin Ratio     (1.1-1.8)  


 


Mycoplasma pneumon IgM     (<770)  U/mL


 


Ur Strep pneumoniae Ag     (Not Detected)  














  02/15/19 02/15/19 02/15/19 Range/Units





  07:06 05:40 05:40 


 


WBC   4.3 L   (4.5-11.0)  10^3/uL


 


RBC   3.87   (3.5-6.1)  10^6/uL


 


Hgb   11.9 L   (14.0-18.0)  g/dL


 


Hct   36.0 L   (42.0-52.0)  %


 


MCV   93.0   (80.0-105.0)  fl


 


MCH   30.7   (25.0-35.0)  pg


 


MCHC   33.1   (31.0-37.0)  g/dl


 


RDW   14.8 H   (11.5-14.5)  %


 


Plt Count   123   (120.0-450.0)  10^3/uL


 


MPV   10.0   (7.0-11.0)  fl


 


Neut % (Auto)   71.2 H   (50.0-68.0)  %


 


Lymph % (Auto)   15.7 L   (22.0-35.0)  %


 


Mono % (Auto)   8.3 H   (1.0-6.0)  %


 


Eos % (Auto)   4.6   (1.5-5.0)  %


 


Baso % (Auto)   0.2   (0.0-3.0)  %


 


Lymph # (Auto)   0.7 L   (1.2-3.4)  


 


Mono # (Auto)   0.4   (0.1-0.6)  


 


Eos # (Auto)   0.2   (0.0-0.7)  


 


Baso # (Auto)   0.01   (0.0-2.0)  K/mm3


 


Absolute Neuts (auto)   3.08   (1.4-6.5)  


 


PT     (9.4-12.5)  SECONDS


 


INR     


 


APTT     (26.9-38.3)  Seconds


 


Sodium    144  (132-148)  mmol/L


 


Potassium    3.5 L  (3.6-5.0)  mmol/L


 


Chloride    110 H  ()  mmol/L


 


Carbon Dioxide    27  (21-33)  mmol/L


 


Anion Gap    11  (10-20)  


 


BUN    21  (7-21)  mg/dL


 


Creatinine    1.2  (0.8-1.5)  mg/dl


 


Est GFR (African Amer)    > 60  


 


Est GFR (Non-Af Amer)    58  


 


POC Glucose (mg/dL)  117 H    ()  mg/dL


 


Random Glucose    119 H  ()  mg/dL


 


Calcium    8.4  (8.4-10.5)  mg/dL


 


Phosphorus    3.1  (2.5-4.5)  mg/dL


 


Magnesium    2.7 H  (1.7-2.2)  mg/dL


 


Total Bilirubin    0.8  (0.2-1.3)  mg/dL


 


Direct Bilirubin    0.4  (0.0-0.4)  mg/dL


 


AST    23  (17-59)  U/L


 


ALT    23  (7-56)  U/L


 


Alkaline Phosphatase    55  ()  U/L


 


NT-Pro-B Natriuret Pep    2330 H  (0-450)  pg/mL


 


Total Protein    5.5 L  (5.8-8.3)  g/dL


 


Albumin    2.9 L  (3.0-4.8)  g/dL


 


Globulin    2.6  gm/dL


 


Albumin/Globulin Ratio    1.1  (1.1-1.8)  


 


Mycoplasma pneumon IgM     (<770)  U/mL


 


Ur Strep pneumoniae Ag     (Not Detected)  














  02/14/19 02/14/19 02/14/19 Range/Units





  23:55 22:43 21:39 


 


WBC     (4.5-11.0)  10^3/uL


 


RBC     (3.5-6.1)  10^6/uL


 


Hgb  11.6 L    (14.0-18.0)  g/dL


 


Hct  35.4 L    (42.0-52.0)  %


 


MCV     (80.0-105.0)  fl


 


MCH     (25.0-35.0)  pg


 


MCHC     (31.0-37.0)  g/dl


 


RDW     (11.5-14.5)  %


 


Plt Count     (120.0-450.0)  10^3/uL


 


MPV     (7.0-11.0)  fl


 


Neut % (Auto)     (50.0-68.0)  %


 


Lymph % (Auto)     (22.0-35.0)  %


 


Mono % (Auto)     (1.0-6.0)  %


 


Eos % (Auto)     (1.5-5.0)  %


 


Baso % (Auto)     (0.0-3.0)  %


 


Lymph # (Auto)     (1.2-3.4)  


 


Mono # (Auto)     (0.1-0.6)  


 


Eos # (Auto)     (0.0-0.7)  


 


Baso # (Auto)     (0.0-2.0)  K/mm3


 


Absolute Neuts (auto)     (1.4-6.5)  


 


PT     (9.4-12.5)  SECONDS


 


INR     


 


APTT     (26.9-38.3)  Seconds


 


Sodium     (132-148)  mmol/L


 


Potassium     (3.6-5.0)  mmol/L


 


Chloride     ()  mmol/L


 


Carbon Dioxide     (21-33)  mmol/L


 


Anion Gap     (10-20)  


 


BUN     (7-21)  mg/dL


 


Creatinine     (0.8-1.5)  mg/dl


 


Est GFR (African Amer)     


 


Est GFR (Non-Af Amer)     


 


POC Glucose (mg/dL)   139 H  57 L  ()  mg/dL


 


Random Glucose     ()  mg/dL


 


Calcium     (8.4-10.5)  mg/dL


 


Phosphorus     (2.5-4.5)  mg/dL


 


Magnesium     (1.7-2.2)  mg/dL


 


Total Bilirubin     (0.2-1.3)  mg/dL


 


Direct Bilirubin     (0.0-0.4)  mg/dL


 


AST     (17-59)  U/L


 


ALT     (7-56)  U/L


 


Alkaline Phosphatase     ()  U/L


 


NT-Pro-B Natriuret Pep     (0-450)  pg/mL


 


Total Protein     (5.8-8.3)  g/dL


 


Albumin     (3.0-4.8)  g/dL


 


Globulin     gm/dL


 


Albumin/Globulin Ratio     (1.1-1.8)  


 


Mycoplasma pneumon IgM     (<770)  U/mL


 


Ur Strep pneumoniae Ag     (Not Detected)  














  02/14/19 02/12/19 02/12/19 Range/Units





  19:11 13:30 10:30 


 


WBC     (4.5-11.0)  10^3/uL


 


RBC     (3.5-6.1)  10^6/uL


 


Hgb  12.1 L    (14.0-18.0)  g/dL


 


Hct  36.2 L    (42.0-52.0)  %


 


MCV     (80.0-105.0)  fl


 


MCH     (25.0-35.0)  pg


 


MCHC     (31.0-37.0)  g/dl


 


RDW     (11.5-14.5)  %


 


Plt Count     (120.0-450.0)  10^3/uL


 


MPV     (7.0-11.0)  fl


 


Neut % (Auto)     (50.0-68.0)  %


 


Lymph % (Auto)     (22.0-35.0)  %


 


Mono % (Auto)     (1.0-6.0)  %


 


Eos % (Auto)     (1.5-5.0)  %


 


Baso % (Auto)     (0.0-3.0)  %


 


Lymph # (Auto)     (1.2-3.4)  


 


Mono # (Auto)     (0.1-0.6)  


 


Eos # (Auto)     (0.0-0.7)  


 


Baso # (Auto)     (0.0-2.0)  K/mm3


 


Absolute Neuts (auto)     (1.4-6.5)  


 


PT     (9.4-12.5)  SECONDS


 


INR     


 


APTT     (26.9-38.3)  Seconds


 


Sodium     (132-148)  mmol/L


 


Potassium     (3.6-5.0)  mmol/L


 


Chloride     ()  mmol/L


 


Carbon Dioxide     (21-33)  mmol/L


 


Anion Gap     (10-20)  


 


BUN     (7-21)  mg/dL


 


Creatinine     (0.8-1.5)  mg/dl


 


Est GFR (African Amer)     


 


Est GFR (Non-Af Amer)     


 


POC Glucose (mg/dL)     ()  mg/dL


 


Random Glucose     ()  mg/dL


 


Calcium     (8.4-10.5)  mg/dL


 


Phosphorus     (2.5-4.5)  mg/dL


 


Magnesium     (1.7-2.2)  mg/dL


 


Total Bilirubin     (0.2-1.3)  mg/dL


 


Direct Bilirubin     (0.0-0.4)  mg/dL


 


AST     (17-59)  U/L


 


ALT     (7-56)  U/L


 


Alkaline Phosphatase     ()  U/L


 


NT-Pro-B Natriuret Pep     (0-450)  pg/mL


 


Total Protein     (5.8-8.3)  g/dL


 


Albumin     (3.0-4.8)  g/dL


 


Globulin     gm/dL


 


Albumin/Globulin Ratio     (1.1-1.8)  


 


Mycoplasma pneumon IgM    230  (<770)  U/mL


 


Ur Strep pneumoniae Ag   Not detected   (Not Detected)  








                         Laboratory Results - last 24 hr











  02/12/19 02/12/19 02/14/19





  10:30 13:30 19:11


 


WBC   


 


RBC   


 


Hgb    12.1 L


 


Hct    36.2 L


 


MCV   


 


MCH   


 


MCHC   


 


RDW   


 


Plt Count   


 


MPV   


 


Neut % (Auto)   


 


Lymph % (Auto)   


 


Mono % (Auto)   


 


Eos % (Auto)   


 


Baso % (Auto)   


 


Lymph # (Auto)   


 


Mono # (Auto)   


 


Eos # (Auto)   


 


Baso # (Auto)   


 


Absolute Neuts (auto)   


 


PT   


 


INR   


 


APTT   


 


Sodium   


 


Potassium   


 


Chloride   


 


Carbon Dioxide   


 


Anion Gap   


 


BUN   


 


Creatinine   


 


Est GFR ( Amer)   


 


Est GFR (Non-Af Amer)   


 


POC Glucose (mg/dL)   


 


Random Glucose   


 


Calcium   


 


Phosphorus   


 


Magnesium   


 


Total Bilirubin   


 


Direct Bilirubin   


 


AST   


 


ALT   


 


Alkaline Phosphatase   


 


NT-Pro-B Natriuret Pep   


 


Total Protein   


 


Albumin   


 


Globulin   


 


Albumin/Globulin Ratio   


 


Mycoplasma pneumon IgM  230  


 


Ur Strep pneumoniae Ag   Not detected 














  02/14/19 02/14/19 02/14/19





  21:39 22:43 23:55


 


WBC   


 


RBC   


 


Hgb    11.6 L


 


Hct    35.4 L


 


MCV   


 


MCH   


 


MCHC   


 


RDW   


 


Plt Count   


 


MPV   


 


Neut % (Auto)   


 


Lymph % (Auto)   


 


Mono % (Auto)   


 


Eos % (Auto)   


 


Baso % (Auto)   


 


Lymph # (Auto)   


 


Mono # (Auto)   


 


Eos # (Auto)   


 


Baso # (Auto)   


 


Absolute Neuts (auto)   


 


PT   


 


INR   


 


APTT   


 


Sodium   


 


Potassium   


 


Chloride   


 


Carbon Dioxide   


 


Anion Gap   


 


BUN   


 


Creatinine   


 


Est GFR ( Amer)   


 


Est GFR (Non-Af Amer)   


 


POC Glucose (mg/dL)  57 L  139 H 


 


Random Glucose   


 


Calcium   


 


Phosphorus   


 


Magnesium   


 


Total Bilirubin   


 


Direct Bilirubin   


 


AST   


 


ALT   


 


Alkaline Phosphatase   


 


NT-Pro-B Natriuret Pep   


 


Total Protein   


 


Albumin   


 


Globulin   


 


Albumin/Globulin Ratio   


 


Mycoplasma pneumon IgM   


 


Ur Strep pneumoniae Ag   














  02/15/19 02/15/19 02/15/19





  05:40 05:40 07:06


 


WBC   4.3 L 


 


RBC   3.87 


 


Hgb   11.9 L 


 


Hct   36.0 L 


 


MCV   93.0 


 


MCH   30.7 


 


MCHC   33.1 


 


RDW   14.8 H 


 


Plt Count   123 


 


MPV   10.0 


 


Neut % (Auto)   71.2 H 


 


Lymph % (Auto)   15.7 L 


 


Mono % (Auto)   8.3 H 


 


Eos % (Auto)   4.6 


 


Baso % (Auto)   0.2 


 


Lymph # (Auto)   0.7 L 


 


Mono # (Auto)   0.4 


 


Eos # (Auto)   0.2 


 


Baso # (Auto)   0.01 


 


Absolute Neuts (auto)   3.08 


 


PT   


 


INR   


 


APTT   


 


Sodium  144  


 


Potassium  3.5 L  


 


Chloride  110 H  


 


Carbon Dioxide  27  


 


Anion Gap  11  


 


BUN  21  


 


Creatinine  1.2  


 


Est GFR ( Amer)  > 60  


 


Est GFR (Non-Af Amer)  58  


 


POC Glucose (mg/dL)    117 H


 


Random Glucose  119 H  


 


Calcium  8.4  


 


Phosphorus  3.1  


 


Magnesium  2.7 H  


 


Total Bilirubin  0.8  


 


Direct Bilirubin  0.4  


 


AST  23  


 


ALT  23  


 


Alkaline Phosphatase  55  


 


NT-Pro-B Natriuret Pep  2330 H  


 


Total Protein  5.5 L  


 


Albumin  2.9 L  


 


Globulin  2.6  


 


Albumin/Globulin Ratio  1.1  


 


Mycoplasma pneumon IgM   


 


Ur Strep pneumoniae Ag   














  02/15/19 02/15/19 02/15/19





  10:00 11:31 16:49


 


WBC   


 


RBC   


 


Hgb   


 


Hct   


 


MCV   


 


MCH   


 


MCHC   


 


RDW   


 


Plt Count   


 


MPV   


 


Neut % (Auto)   


 


Lymph % (Auto)   


 


Mono % (Auto)   


 


Eos % (Auto)   


 


Baso % (Auto)   


 


Lymph # (Auto)   


 


Mono # (Auto)   


 


Eos # (Auto)   


 


Baso # (Auto)   


 


Absolute Neuts (auto)   


 


PT  15.8 H  


 


INR  1.40  


 


APTT  31.6  


 


Sodium   


 


Potassium   


 


Chloride   


 


Carbon Dioxide   


 


Anion Gap   


 


BUN   


 


Creatinine   


 


Est GFR ( Amer)   


 


Est GFR (Non-Af Amer)   


 


POC Glucose (mg/dL)   129 H  304 H


 


Random Glucose   


 


Calcium   


 


Phosphorus   


 


Magnesium   


 


Total Bilirubin   


 


Direct Bilirubin   


 


AST   


 


ALT   


 


Alkaline Phosphatase   


 


NT-Pro-B Natriuret Pep   


 


Total Protein   


 


Albumin   


 


Globulin   


 


Albumin/Globulin Ratio   


 


Mycoplasma pneumon IgM   


 


Ur Strep pneumoniae Ag   











EKG/Cardiology Studies: 


Cardiology / EKG Studies





02/15/19 07:00


EKG [ELECTROCARDIOGRAM] DAILY 


   Comment: 


   Reason For Exam: ACS














Critical Care Progress Note





- Nutrition


Nutrition: 


                                    Nutrition











 Category Date Time Status


 


 Liquid Diet [DIET] Diets  02/15/19 Lunch Ordered














Addendum


Addendum: 





02/15/19 18:20


ICU Attending Addendum 


Patient seen and examined. Case reviewed on round with housestaff.  Agree with 

resident note above with the following additions/exceptions





85M PMHx HTN, CAD s/p CABG, T2DM, HLD, and asthma found to have NSTEMI s/p  

placement of three bare-metal stent in the mid- and proximal RCA


Complicated by lower gi bleed


s/p colonscopy this AM





CAD


CABG


s/p PCI


DMII


HLD


Acute GIB, likely Lower





hemodynamically stable


holding ASA, Plavix 


GI consulted, Dr Dobbs


Cardiology follow up





doubt infection, woudl stop abx 


abx per ID





Rest of care as above in housestaff note





Jesus Arias MD


Pulmonary Critical Care Attending

## 2019-02-15 NOTE — PN
DATE:  02/15/2019



SUBJECTIVE:  The patient tolerated endoscopy, remains lethargic.



PHYSICAL EXAMINATION:

VITAL SIGNS:  Blood pressure 118/47, heart rate in the 60s.

NECK:  Negative JVD.

LUNGS:  No rales noted.

HEART:  Reveal S1, S2.

EXTREMITIES:  Without edema.



LABORATORY DATA:  Hemoglobin is 11.9.  Chemistries, BUN and creatinine

unremarkable.  The glucose is 119.



IMPRESSION:

1.  Status post percutaneous transluminal coronary angioplasty and stent of

a 90% right coronary artery stenoses.

2.  Ischemic dilated cardiomyopathy.

3.  Status post coronary artery bypass surgery.

4.  Gastrointestinal lower bleed, likely from his diverticula.

5.  Diabetes mellitus.



PLAN:  I had an extensive discussion with Dr. Hill, the gastroenterologist,

about the urgency of placing the patient back on antiplatelet drug.  The

risks, benefit, analysis of bleeding versus acute stent thrombosis were

reviewed.  We need to restart the patient back on his antiplatelet

medication as soon as possible.







__________________________________________

Onel Ann MD





DD:  02/15/2019 13:45:13

DT:  02/15/2019 13:48:19

Job # [id number]

## 2019-02-16 NOTE — PN
DATE:  02/16/2019



SUBJECTIVE:  The patient's hemoglobin remains stable.



PHYSICAL EXAMINATION:

VITAL SIGNS:  Blood pressure is 129/68, heart rate is in the 70s.

NECK:  Negative JVD.

LUNGS:  Without rales.

HEART:  S1, S2.

EXTREMITIES:  Without edema.



LABORATORY DATA:  Hemoglobin is 11.4.  Chemistries, BUN and creatinine is

20 and 1.2.



IMPRESSION:

1.  Recent gastrointestinal bleed.

2.  Recent percutaneous transluminal coronary angioplasty and stent with

bare-metal stent of the right coronary artery.

3.   Anemia which is stable.

4.  Diverticuli.

5.  Diabetes mellitus.



PLAN:  Given these findings, it will be necessary to start the patient on

antiplatelet therapy with to avoid _____ thrombosis, which would lead to an

acute MI.



Aspirin will be started today.  We would like to add Plavix as soon as

possible.  We will discuss with GI.







__________________________________________

Onel Ann MD





DD:  02/16/2019 10:10:12

DT:  02/16/2019 10:13:36

Job # [id number]

## 2019-02-16 NOTE — CP.PCM.PN
Subjective





- Date & Time of Evaluation


Date of Evaluation: 02/16/19


Time of Evaluation: 06:55





- Subjective


Subjective: 





Surgery Progress note. Dr. Randle





Pt seen and examined at bedside this morning. Nephew at bedside. Patient denies 

any complaints. Denies any more bloody or dark bowel movements. Had colonoscopy 

yesterday with evidence of angiodysplastic lesions, diverticuli, and 

hemorrhoids. No evidence of active bleeding. 





Objective





- Vital Signs/Intake and Output


Vital Signs (last 24 hours): 


                                        











Temp Pulse Resp BP Pulse Ox


 


 98 F   70   29 H  129/68   100 


 


 02/16/19 04:00  02/16/19 07:31  02/16/19 02:40  02/16/19 07:31  02/16/19 02:40








Intake and Output: 


                                        











 02/16/19 02/16/19





 06:59 18:59


 


Intake Total 390 


 


Balance 390 














- Medications


Medications: 


                               Current Medications





Acetaminophen (Tylenol 325mg Tab)  650 mg PO Q6 PRN


   PRN Reason: TEMP>=99.5F


Acetaminophen (Tylenol 650 Mg Supp)  650 mg RC Q6H PRN


   PRN Reason: TEMP>=99.5F


Acetylcysteine (Acetylcysteine 20%)  4 ml IH Q8 [facility]


   Last Admin: 02/16/19 07:55 Dose:  4 ml


Aspirin (Aspirin Chewable)  81 mg PO DAILY [facility]


Atorvastatin Calcium (Lipitor)  40 mg PO DIN [facility]


   Last Admin: 02/15/19 17:07 Dose:  40 mg


Carvedilol (Coreg)  12.5 mg PO BID [facility]


   Last Admin: 02/14/19 11:11 Dose:  12.5 mg


Dextrose (Dextrose 50% Inj)  0 ml IV STAT PRN; Protocol


   PRN Reason: Hypoglycemia Protocol


   Last Admin: 02/15/19 21:50 Dose:  50 ml


Docusate Sodium (Colace)  100 mg PO TID [facility]


   Last Admin: 02/15/19 16:59 Dose:  Not Given


Famotidine (Pepcid)  40 mg PO HS [facility]


   Last Admin: 02/15/19 21:16 Dose:  40 mg


Ferrous Sulfate (Feosol)  324 mg PO TID [facility]


   Last Admin: 02/15/19 17:08 Dose:  324 mg


Furosemide (Lasix)  40 mg IVP DAILY [facility]


   Last Admin: 02/15/19 10:28 Dose:  40 mg


Doxycycline Hyclate 100 mg/ (Sodium Chloride)  100 mls @ 100 mls/hr IVPB Q12 

[facility]; Protocol


   Last Admin: 02/15/19 21:17 Dose:  100 mls/hr


Ceftriaxone Sodium (Rocephin 1 Gram Ivpb)  1 gm in 100 mls @ 100 mls/hr IVPB 

DAILY [facility]; Protocol


   Last Admin: 02/15/19 10:30 Dose:  100 mls/hr


Dextrose (Dextrose 5% In Water 1000 Ml)  1,000 mls @ 0 mls/hr IV .Q0M PRN; 

Protocol


   PRN Reason: Hypoglycemia Protocol


Metronidazole (Flagyl)  500 mg in 100 mls @ 100 mls/hr IVPB Q8 [facility]; Protocol


   Last Admin: 02/16/19 05:26 Dose:  100 mls/hr


Milrinone Lactate/Dextrose (Primacor 20mg/100ml D5w)  100 mls @ 8.93 mls/hr IV 

[id number] PRN; Protocol


   PRN Reason: TITRATE PER MD ORDER


   Last Admin: 02/16/19 07:31 Dose:  0.375 mcg/kg/min, 8.93 mls/hr


Insulin Human Regular (Humulin R Low)  0 units SC ACHS [facility]; Protocol


   Last Admin: 02/15/19 22:22 Dose:  Not Given


Levalbuterol HCl (Xopenex)  0.63 mg IH [id number] PRN


   PRN Reason: Shortness of Breath


Levalbuterol HCl (Xopenex)  0.63 mg IH Q8 [facility]


   Last Admin: 02/16/19 07:55 Dose:  0.63 mg


Levothyroxine Sodium (Synthroid)  25 mcg PO 0600 [facility]


   Last Admin: 02/16/19 05:27 Dose:  25 mcg


Magnesium Oxide (Mag-Ox)  400 mg PO BID [facility]


   Last Admin: 02/15/19 17:07 Dose:  400 mg


Ondansetron HCl (Zofran Inj)  4 mg IVP Q4H PRN


   PRN Reason: Nausea/Vomiting


Pantoprazole Sodium (Protonix Inj)  40 mg IVP Q12 [facility]


   Last Admin: 02/15/19 21:15 Dose:  40 mg


Potassium Chloride (Klor-Con 10)  10 meq PO BRK [facility]


   Last Admin: 02/15/19 10:28 Dose:  10 meq


Ramipril (Altace)  10 mg PO DAILY [facility]


   Last Admin: 02/14/19 11:32 Dose:  10 mg


Tramadol HCl (Ultram)  50 mg PO Q6 PRN


   PRN Reason: Pain, moderate (4-7)











- Labs


Labs: 


                                        





                                 02/16/19 05:30 





                                 02/16/19 05:30 





                                        











PT  15.8 SECONDS (9.4-12.5)  H  02/15/19  10:00    


 


INR  1.40   02/15/19  10:00    


 


APTT  31.6 Seconds (26.9-38.3)   02/15/19  10:00    














- Constitutional


Appears: Non-toxic, No Acute Distress





- Head Exam


Head Exam: ATRAUMATIC, NORMAL INSPECTION, NORMOCEPHALIC





- Eye Exam


Eye Exam: EOMI, Normal appearance.  absent: Scleral icterus





- ENT Exam


ENT Exam: Mucous Membranes Moist





- Respiratory Exam


Respiratory Exam: NORMAL BREATHING PATTERN.  absent: Accessory Muscle Use, 

Respiratory Distress





- Cardiovascular Exam


Cardiovascular Exam: absent: JVD





- GI/Abdominal Exam


GI & Abdominal Exam: Soft.  absent: Distended, Firm, Guarding, Tenderness, 

Rebound





- Extremities Exam


Extremities Exam: Normal Inspection





- Neurological Exam


Neurological Exam: Alert, Awake





- Skin


Skin Exam: Dry, Intact, Normal Color, Warm





Assessment and Plan





- Assessment and Plan (Free Text)


Assessment: 





86yo M w GIB. No further episodes noted. s/p colonoscopy by GI on 2/15/19 and 

findings of angiodysplastic lesions, divertiulosis, and hemorrhoids noted. 

Patient remains hemodynamically stable. 


Plan: 





- No indication for any acute surgical intervention at this time


- Monitor H/h


- Continue GI recs


- Medical management as per Primary and ICU teams





Further recs as per Dr. Jer Magana PGY2


surgery

## 2019-02-16 NOTE — CP.CCUPN
<Carter Clifton - Last Filed: 02/16/19 15:14>





CCU Subjective





- Physician Review


Subjective (Free Text): 





CRITICAL CARE PROGRESS NOTE FOR DR. JUANA Clifton PGY1





Pt seen and examined at bedside in ICU this am. Pt is resting comfortably. 

Denies 12 point ROS





Currently on Milrinone drip 0.3731 mcg/kg/min


Rocephin 














CCU Objective





- Vital Signs / Intake & Output


Vital Signs (Last 4 hours): 


Vital Signs











  Pulse BP


 


 02/16/19 10:01   141/64


 


 02/16/19 07:31  70  129/68











Intake and Output (Last 8hrs): 


                                 Intake & Output











 02/15/19 02/16/19 02/16/19





 22:59 06:59 14:59


 


Intake Total 1040 390 


 


Output Total 750  


 


Balance 290 390 


 


Weight  63.503 kg 


 


Intake:   


 


   390 


 


    Left Antecubital 300 300 


 


    Right Forearm  90 


 


  Oral 640  


 


Output:   


 


  Urine 750  


 


    2-way Urethral 750  


 


Other:   


 


  # Bowel Movements 1  














- Physical Exam


Head: Positive for: Atraumatic, Normocephalic


Pupils: Positive for: PERRL


Extroacular Muscles: Positive for: EOMI


Conjunctiva: Positive for: Normal


Mouth: Positive for: Moist Mucous Membranes


Neck: Positive for: Normal Range of Motion


Respiratory/Chest: Positive for: Clear to Auscultation, Good Air Exchange.  

Negative for: Respiratory Distress, Accessory Muscle Use


Cardiovascular: Positive for: Regular Rate and Rhythm, Normal S1, S2, Other.  

Negative for: Murmurs


Abdomen: Positive for: Tenderness (LLQ tenderness), Distention.  Negative for: 

Rebound, Guarding


Back: Positive for: Normal Inspection


Upper Extremity: Positive for: Normal Inspection.  Negative for: Cyanosis, Edema


Lower Extremity: Positive for: Edema (Pitting edema)


Neurological: Positive for: GCS=15, Speech Normal


Skin: Positive for: Warm, Dry, Normal Color.  Negative for: Rashes


Psychiatric: Positive for: Alert, Oriented x 3, Normal Insight, Normal 

Concentration





- Medications


Active Medications: 


Active Medications











Generic Name Dose Route Start Last Admin





  Trade Name Freq  PRN Reason Stop Dose Admin


 


Acetaminophen  650 mg  02/12/19 11:06  





  Tylenol 325mg Tab  PO   





  Q6 PRN   





  TEMP>=99.5F   





     





     





     


 


Acetaminophen  650 mg  02/12/19 11:06  





  Tylenol 650 Mg Supp  RC   





  Q6H PRN   





  TEMP>=99.5F   





     





     





     


 


Acetylcysteine  4 ml  02/12/19 09:30  02/16/19 07:55





  Acetylcysteine 20%  IH   4 ml





  Q8 GRISELDA   Administration





     





     





     





     


 


Aspirin  81 mg  02/16/19 10:00  02/16/19 10:02





  Aspirin Chewable  PO   81 mg





  DAILY GRISELDA   Administration





     





     





     





     


 


Atorvastatin Calcium  40 mg  02/12/19 17:00  02/15/19 17:07





  Lipitor  PO   40 mg





  DIN GRISELDA   Administration





     





     





     





     


 


Carvedilol  12.5 mg  02/12/19 10:00  02/14/19 11:11





  Coreg  PO   12.5 mg





  BID GRISELDA   Administration





     





     





     





     


 


Dextrose  0 ml  02/12/19 11:06  02/15/19 21:50





  Dextrose 50% Inj  IV   50 ml





  STAT PRN   Administration





  Hypoglycemia Protocol   





     





  Protocol   





     


 


Docusate Sodium  100 mg  02/12/19 14:00  02/16/19 10:02





  Colace  PO   100 mg





  TID GRISELDA   Administration





     





     





     





     


 


Famotidine  40 mg  02/12/19 22:00  02/15/19 21:16





  Pepcid  PO   40 mg





  HS GRISELDA   Administration





     





     





     





     


 


Ferrous Sulfate  324 mg  02/15/19 10:00  02/16/19 10:02





  Feosol  PO   324 mg





  TID GRISELDA   Administration





     





     





     





     


 


Furosemide  40 mg  02/14/19 10:00  02/16/19 10:01





  Lasix  IVP   40 mg





  DAILY GRISELDA   Administration





     





     





     





     


 


Doxycycline Hyclate 100 mg/  100 mls @ 100 mls/hr  02/12/19 10:00  02/16/19 

10:04





  Sodium Chloride  IVPB   100 mls/hr





  Q12 GRISELDA   Administration





     





     





  Protocol   





     


 


Ceftriaxone Sodium  1 gm in 100 mls @ 100 mls/hr  02/12/19 10:00  02/16/19 10:03





  Rocephin 1 Gram Ivpb  IVPB   100 mls/hr





  DAILY GRISELDA   Administration





     





     





  Protocol   





     


 


Dextrose  1,000 mls @ 0 mls/hr  02/12/19 11:06  





  Dextrose 5% In Water 1000 Ml  IV   





  .Q0M PRN   





  Hypoglycemia Protocol   





     





  Protocol   





  Per Protocol   


 


Metronidazole  500 mg in 100 mls @ 100 mls/hr  02/12/19 14:00  02/16/19 05:26





  Flagyl  IVPB   100 mls/hr





  Q8 GRISELDA   Administration





     





     





  Protocol   





     


 


Milrinone Lactate/Dextrose  100 mls @ 8.93 mls/hr  02/13/19 14:33  02/16/19 

07:31





  Primacor 20mg/100ml D5w  IV   0.375 mcg/kg/min





  .[id number] PRN   8.93 mls/hr





  TITRATE PER MD ORDER   Administration





     





  Protocol   





  0.375 MCG/KG/MIN   


 


Insulin Human Regular  0 units  02/15/19 11:30  02/16/19 08:04





  Humulin R Low  SC   Not Given





  ACHS GRISELDA   





     





     





  Protocol   





     


 


Levalbuterol HCl  0.63 mg  02/11/19 23:24  





  Xopenex  IH   





  [id number] PRN   





  Shortness of Breath   





     





     





     


 


Levalbuterol HCl  0.63 mg  02/12/19 09:30  02/16/19 07:55





  Xopenex  IH   0.63 mg





  Q8 GRISELDA   Administration





     





     





     





     


 


Levothyroxine Sodium  25 mcg  02/12/19 06:00  02/16/19 05:27





  Synthroid  PO   25 mcg





  0600 GRISELDA   Administration





     





     





     





     


 


Magnesium Oxide  400 mg  02/14/19 10:00  02/16/19 10:02





  Mag-Ox  PO   400 mg





  BID GRISELDA   Administration





     





     





     





     


 


Ondansetron HCl  4 mg  02/12/19 11:06  





  Zofran Inj  IVP   





  Q4H PRN   





  Nausea/Vomiting   





     





     





     


 


Pantoprazole Sodium  40 mg  02/14/19 10:00  02/16/19 10:01





  Protonix Inj  IVP   40 mg





  Q12 GRISELDA   Administration





     





     





     





     


 


Potassium Chloride  10 meq  02/15/19 08:00  02/16/19 08:09





  Klor-Con 10  PO   10 meq





  BRK GRISELDA   Administration





     





     





     





     


 


Ramipril  10 mg  02/12/19 10:00  02/14/19 11:32





  Altace  PO   10 mg





  DAILY GRISELDA   Administration





     





     





     





     


 


Tramadol HCl  50 mg  02/11/19 23:39  





  Ultram  PO   





  Q6 PRN   





  Pain, moderate (4-7)   





     





     





     














- Patient Studies


Lab Studies: 


                              Microbiology Studies











 02/12/19 10:30 Blood Culture - Preliminary





 Blood-Venous    NO GROWTH AFTER 4 DAYS


 


 02/12/19 10:00 Blood Culture - Preliminary





 Blood-Venous    NO GROWTH AFTER 4 DAYS


 


 02/14/19 11:15 MRSA Culture (Admit) - Final





 Naris    MRSA NOT DETECTED








                                   Lab Studies











  02/16/19 02/16/19 02/16/19 Range/Units





  05:30 05:30 00:08 


 


WBC  4.8    (4.5-11.0)  10^3/uL


 


RBC  3.72    (3.5-6.1)  10^6/uL


 


Hgb  11.4 L    (14.0-18.0)  g/dL


 


Hct  35.0 L    (42.0-52.0)  %


 


MCV  94.1    (80.0-105.0)  fl


 


MCH  30.6    (25.0-35.0)  pg


 


MCHC  32.6    (31.0-37.0)  g/dl


 


RDW  14.7 H    (11.5-14.5)  %


 


Plt Count  131    (120.0-450.0)  10^3/uL


 


MPV  9.6    (7.0-11.0)  fl


 


Neut % (Auto)  69.5 H    (50.0-68.0)  %


 


Lymph % (Auto)  16.4 L    (22.0-35.0)  %


 


Mono % (Auto)  8.3 H    (1.0-6.0)  %


 


Eos % (Auto)  5.6 H    (1.5-5.0)  %


 


Baso % (Auto)  0.2    (0.0-3.0)  %


 


Lymph # (Auto)  0.8 L    (1.2-3.4)  


 


Mono # (Auto)  0.4    (0.1-0.6)  


 


Eos # (Auto)  0.3    (0.0-0.7)  


 


Baso # (Auto)  0.01    (0.0-2.0)  K/mm3


 


Absolute Neuts (auto)  3.35    (1.4-6.5)  


 


Sodium   141   (132-148)  mmol/L


 


Potassium   3.7   (3.6-5.0)  mmol/L


 


Chloride   110 H   ()  mmol/L


 


Carbon Dioxide   28   (21-33)  mmol/L


 


Anion Gap   7 L   (10-20)  


 


BUN   20   (7-21)  mg/dL


 


Creatinine   1.2   (0.8-1.5)  mg/dl


 


Est GFR (African Amer)   > 60   


 


Est GFR (Non-Af Amer)   58   


 


POC Glucose (mg/dL)    109  ()  mg/dL


 


Random Glucose   99   ()  mg/dL


 


Calcium   8.5   (8.4-10.5)  mg/dL


 


Phosphorus   2.6   (2.5-4.5)  mg/dL


 


Magnesium   2.3 H   (1.7-2.2)  mg/dL


 


Total Bilirubin   0.6   (0.2-1.3)  mg/dL


 


Direct Bilirubin   0.4   (0.0-0.4)  mg/dL


 


AST   27   (17-59)  U/L


 


ALT   28   (7-56)  U/L


 


Alkaline Phosphatase   54   ()  U/L


 


NT-Pro-B Natriuret Pep   1580 H   (0-450)  pg/mL


 


Total Protein   5.5 L   (5.8-8.3)  g/dL


 


Albumin   2.8 L   (3.0-4.8)  g/dL


 


Globulin   2.6   gm/dL


 


Albumin/Globulin Ratio   1.1   (1.1-1.8)  














  02/15/19 02/15/19 02/15/19 Range/Units





  21:46 16:49 11:31 


 


WBC     (4.5-11.0)  10^3/uL


 


RBC     (3.5-6.1)  10^6/uL


 


Hgb     (14.0-18.0)  g/dL


 


Hct     (42.0-52.0)  %


 


MCV     (80.0-105.0)  fl


 


MCH     (25.0-35.0)  pg


 


MCHC     (31.0-37.0)  g/dl


 


RDW     (11.5-14.5)  %


 


Plt Count     (120.0-450.0)  10^3/uL


 


MPV     (7.0-11.0)  fl


 


Neut % (Auto)     (50.0-68.0)  %


 


Lymph % (Auto)     (22.0-35.0)  %


 


Mono % (Auto)     (1.0-6.0)  %


 


Eos % (Auto)     (1.5-5.0)  %


 


Baso % (Auto)     (0.0-3.0)  %


 


Lymph # (Auto)     (1.2-3.4)  


 


Mono # (Auto)     (0.1-0.6)  


 


Eos # (Auto)     (0.0-0.7)  


 


Baso # (Auto)     (0.0-2.0)  K/mm3


 


Absolute Neuts (auto)     (1.4-6.5)  


 


Sodium     (132-148)  mmol/L


 


Potassium     (3.6-5.0)  mmol/L


 


Chloride     ()  mmol/L


 


Carbon Dioxide     (21-33)  mmol/L


 


Anion Gap     (10-20)  


 


BUN     (7-21)  mg/dL


 


Creatinine     (0.8-1.5)  mg/dl


 


Est GFR (African Amer)     


 


Est GFR (Non-Af Amer)     


 


POC Glucose (mg/dL)  68  304 H  129 H  ()  mg/dL


 


Random Glucose     ()  mg/dL


 


Calcium     (8.4-10.5)  mg/dL


 


Phosphorus     (2.5-4.5)  mg/dL


 


Magnesium     (1.7-2.2)  mg/dL


 


Total Bilirubin     (0.2-1.3)  mg/dL


 


Direct Bilirubin     (0.0-0.4)  mg/dL


 


AST     (17-59)  U/L


 


ALT     (7-56)  U/L


 


Alkaline Phosphatase     ()  U/L


 


NT-Pro-B Natriuret Pep     (0-450)  pg/mL


 


Total Protein     (5.8-8.3)  g/dL


 


Albumin     (3.0-4.8)  g/dL


 


Globulin     gm/dL


 


Albumin/Globulin Ratio     (1.1-1.8)  








                         Laboratory Results - last 24 hr











  02/15/19 02/15/19 02/15/19





  11:31 16:49 21:46


 


WBC   


 


RBC   


 


Hgb   


 


Hct   


 


MCV   


 


MCH   


 


MCHC   


 


RDW   


 


Plt Count   


 


MPV   


 


Neut % (Auto)   


 


Lymph % (Auto)   


 


Mono % (Auto)   


 


Eos % (Auto)   


 


Baso % (Auto)   


 


Lymph # (Auto)   


 


Mono # (Auto)   


 


Eos # (Auto)   


 


Baso # (Auto)   


 


Absolute Neuts (auto)   


 


Sodium   


 


Potassium   


 


Chloride   


 


Carbon Dioxide   


 


Anion Gap   


 


BUN   


 


Creatinine   


 


Est GFR ( Amer)   


 


Est GFR (Non-Af Amer)   


 


POC Glucose (mg/dL)  129 H  304 H  68


 


Random Glucose   


 


Calcium   


 


Phosphorus   


 


Magnesium   


 


Total Bilirubin   


 


Direct Bilirubin   


 


AST   


 


ALT   


 


Alkaline Phosphatase   


 


NT-Pro-B Natriuret Pep   


 


Total Protein   


 


Albumin   


 


Globulin   


 


Albumin/Globulin Ratio   














  02/16/19 02/16/19 02/16/19





  00:08 05:30 05:30


 


WBC    4.8


 


RBC    3.72


 


Hgb    11.4 L


 


Hct    35.0 L


 


MCV    94.1


 


MCH    30.6


 


MCHC    32.6


 


RDW    14.7 H


 


Plt Count    131


 


MPV    9.6


 


Neut % (Auto)    69.5 H


 


Lymph % (Auto)    16.4 L


 


Mono % (Auto)    8.3 H


 


Eos % (Auto)    5.6 H


 


Baso % (Auto)    0.2


 


Lymph # (Auto)    0.8 L


 


Mono # (Auto)    0.4


 


Eos # (Auto)    0.3


 


Baso # (Auto)    0.01


 


Absolute Neuts (auto)    3.35


 


Sodium   141 


 


Potassium   3.7 


 


Chloride   110 H 


 


Carbon Dioxide   28 


 


Anion Gap   7 L 


 


BUN   20 


 


Creatinine   1.2 


 


Est GFR ( Amer)   > 60 


 


Est GFR (Non-Af Amer)   58 


 


POC Glucose (mg/dL)  109  


 


Random Glucose   99 


 


Calcium   8.5 


 


Phosphorus   2.6 


 


Magnesium   2.3 H 


 


Total Bilirubin   0.6 


 


Direct Bilirubin   0.4 


 


AST   27 


 


ALT   28 


 


Alkaline Phosphatase   54 


 


NT-Pro-B Natriuret Pep   1580 H 


 


Total Protein   5.5 L 


 


Albumin   2.8 L 


 


Globulin   2.6 


 


Albumin/Globulin Ratio   1.1 











Fingerstick Blood Sugar Results: 107





Critical Care Progress Note





- Nutrition


Nutrition: 


                                    Nutrition











 Category Date Time Status


 


 Liquid Diet [DIET] Diets  02/15/19 Lunch Ordered














Assessment/Plan





- Assessment and Plan (Free Text)


Assessment: 





84 yo M with PMH of HTN, CAD s/p CABG, T2DM, HLD, and asthma admitted to [facility] for

 NSTEMI and underwent cardiac catherization with 3 BMS placed in the mid and 

proximal RCA. Patient is transferred to the ICU due to active GI bleed. Patient 

currently hemodynamically stable, will continue to monitor.





Plan: 


Neuro


AOx3


Maintain normothermia





CV


HFrEF


EF noted to be 20-25% during cath


continue lasix 


CAD s/p stents


Will restart b-blocker today and restart Ace-i at 


Will resume ASA today, hold plavix for today, resume tomorrow 2/17


resume plavix 2/17 & 2/18 (per GI recs)


Continue to monitor closely for signs of STEMI as pt is not on DAPT currently


Pulmonary HTN


currently on Milrinone gtt


Maintain MAP > 65


Cardiology following





Pulm


Maintain O2 sat > 92%





GI


Blood per rectum


s/p colonoscopy revealing diverticula, internal hemorrhoids and AVMs with no 

active


per GI, bleeding may be 2/2 DAPT initially


Hold DAPT. Resume ASA 2/16, resume plavix 2/17 / 2/18 per GI recs


Protonix BID


Clear liquid diet





Renal


Replete K


Cont to monitor electrolytes, replete as needed


Maintain euvolemia





Heme


Blood per rectum


hemodynamically stable


recheck coags


monitor BP





ID


ADILSON pneumonia


IV abx per primary





Endo


DM2


ISS-Low


Accuchecks


Maintain euglycemia





Case seen, examined and discussed with attending physician, Dr. Juana Clifton PGY1








<Jesus Arias - Last Filed: 02/16/19 16:49>





CCU Objective





- Vital Signs / Intake & Output


Vital Signs (Last 4 hours): 


Vital Signs











  Pulse Resp BP Pulse Ox


 


 02/16/19 14:40    142/65 


 


 02/16/19 14:10  77  46 H   100


 


 02/16/19 14:00  78  52 H  142/65  100


 


 02/16/19 13:50  71  33 H   100


 


 02/16/19 13:40  79  35 H   100


 


 02/16/19 13:30  79  40 H   100


 


 02/16/19 13:20  79  42 H   100


 


 02/16/19 13:10  78  43 H   100


 


 02/16/19 13:00  91 H  42 H  136/86  100


 


 02/16/19 12:50  80  22   100











Intake and Output (Last 8hrs): 


                                 Intake & Output











 02/16/19 02/16/19 02/16/19





 06:59 14:59 22:59


 


Intake Total 390  


 


Balance 390  


 


Weight 63.503 kg  


 


Intake:   


 


    


 


    Left Antecubital 300  


 


    Right Forearm 90  














- Medications


Active Medications: 


Active Medications











Generic Name Dose Route Start Last Admin





  Trade Name Freq  PRN Reason Stop Dose Admin


 


Acetaminophen  650 mg  02/12/19 11:06  





  Tylenol 325mg Tab  PO   





  Q6 PRN   





  TEMP>=99.5F   





     





     





     


 


Acetaminophen  650 mg  02/12/19 11:06  





  Tylenol 650 Mg Supp  RC   





  Q6H PRN   





  TEMP>=99.5F   





     





     





     


 


Acetylcysteine  4 ml  02/12/19 09:30  02/16/19 14:34





  Acetylcysteine 20%  IH   4 ml





  Q8 GRISELDA   Administration





     





     





     





     


 


Aspirin  81 mg  02/16/19 10:00  02/16/19 10:02





  Aspirin Chewable  PO   81 mg





  DAILY GRISELDA   Administration





     





     





     





     


 


Atorvastatin Calcium  40 mg  02/12/19 17:00  02/15/19 17:07





  Lipitor  PO   40 mg





  DIN GRISELDA   Administration





     





     





     





     


 


Carvedilol  12.5 mg  02/12/19 10:00  02/14/19 11:11





  Coreg  PO   12.5 mg





  BID GRISELDA   Administration





     





     





     





     


 


Dextrose  0 ml  02/12/19 11:06  02/15/19 21:50





  Dextrose 50% Inj  IV   50 ml





  STAT PRN   Administration





  Hypoglycemia Protocol   





     





  Protocol   





     


 


Docusate Sodium  100 mg  02/12/19 14:00  02/16/19 14:36





  Colace  PO   100 mg





  TID GRISELDA   Administration





     





     





     





     


 


Famotidine  40 mg  02/12/19 22:00  02/15/19 21:16





  Pepcid  PO   40 mg





  HS GRISELDA   Administration





     





     





     





     


 


Ferrous Sulfate  324 mg  02/15/19 10:00  02/16/19 14:35





  Feosol  PO   324 mg





  TID GRISELDA   Administration





     





     





     





     


 


Furosemide  40 mg  02/14/19 10:00  02/16/19 10:01





  Lasix  IVP   40 mg





  DAILY GRISELDA   Administration





     





     





     





     


 


Doxycycline Hyclate 100 mg/  100 mls @ 100 mls/hr  02/12/19 10:00  02/16/19 1

0:04





  Sodium Chloride  IVPB   100 mls/hr





  Q12 GRISELDA   Administration





     





     





  Protocol   





     


 


Ceftriaxone Sodium  1 gm in 100 mls @ 100 mls/hr  02/12/19 10:00  02/16/19 10:03





  Rocephin 1 Gram Ivpb  IVPB   100 mls/hr





  DAILY GRISELDA   Administration





     





     





  Protocol   





     


 


Dextrose  1,000 mls @ 0 mls/hr  02/12/19 11:06  





  Dextrose 5% In Water 1000 Ml  IV   





  .Q0M PRN   





  Hypoglycemia Protocol   





     





  Protocol   





  Per Protocol   


 


Metronidazole  500 mg in 100 mls @ 100 mls/hr  02/12/19 14:00  02/16/19 14:35





  Flagyl  IVPB   100 mls/hr





  Q8 GRISELDA   Administration





     





     





  Protocol   





     


 


Milrinone Lactate/Dextrose  100 mls @ 8.93 mls/hr  02/13/19 14:33  02/16/19 

07:31





  Primacor 20mg/100ml D5w  IV   0.375 mcg/kg/min





  .[id number] PRN   8.93 mls/hr





  TITRATE PER MD ORDER   Administration





     





  Protocol   





  0.375 MCG/KG/MIN   


 


Insulin Human Regular  0 units  02/15/19 11:30  02/16/19 11:45





  Humulin R Low  SC   Not Given





  ACHS GRISELDA   





     





     





  Protocol   





     


 


Levalbuterol HCl  0.63 mg  02/11/19 23:24  





  Xopenex  IH   





  [id number] PRN   





  Shortness of Breath   





     





     





     


 


Levalbuterol HCl  0.63 mg  02/12/19 09:30  02/16/19 14:34





  Xopenex  IH   0.63 mg





  Q8 GRISELDA   Administration





     





     





     





     


 


Levothyroxine Sodium  25 mcg  02/12/19 06:00  02/16/19 05:27





  Synthroid  PO   25 mcg





  0600 GRISELDA   Administration





     





     





     





     


 


Magnesium Oxide  400 mg  02/14/19 10:00  02/16/19 10:02





  Mag-Ox  PO   400 mg





  BID GRISELDA   Administration





     





     





     





     


 


Ondansetron HCl  4 mg  02/12/19 11:06  





  Zofran Inj  IVP   





  Q4H PRN   





  Nausea/Vomiting   





     





     





     


 


Pantoprazole Sodium  40 mg  02/14/19 10:00  02/16/19 10:01





  Protonix Inj  IVP   40 mg





  Q12 GRISELDA   Administration





     





     





     





     


 


Potassium Chloride  10 meq  02/15/19 08:00  02/16/19 08:09





  Klor-Con 10  PO   10 meq





  BRK GRISELDA   Administration





     





     





     





     


 


Ramipril  5 mg  02/16/19 14:00  02/16/19 14:40





  Altace  PO   5 mg





  DAILY GRISELDA   Administration





     





     





     





     


 


Tramadol HCl  50 mg  02/11/19 23:39  





  Ultram  PO   





  Q6 PRN   





  Pain, moderate (4-7)   





     





     





     














- Patient Studies


Lab Studies: 


                              Microbiology Studies











 02/12/19 10:30 Blood Culture - Preliminary





 Blood-Venous    NO GROWTH AFTER 4 DAYS


 


 02/12/19 10:00 Blood Culture - Preliminary





 Blood-Venous    NO GROWTH AFTER 4 DAYS


 


 02/14/19 11:15 MRSA Culture (Admit) - Final





 Naris    MRSA NOT DETECTED








                                   Lab Studies











  02/16/19 02/16/19 02/16/19 Range/Units





  11:28 07:42 05:30 


 


WBC    4.8  (4.5-11.0)  10^3/uL


 


RBC    3.72  (3.5-6.1)  10^6/uL


 


Hgb    11.4 L  (14.0-18.0)  g/dL


 


Hct    35.0 L  (42.0-52.0)  %


 


MCV    94.1  (80.0-105.0)  fl


 


MCH    30.6  (25.0-35.0)  pg


 


MCHC    32.6  (31.0-37.0)  g/dl


 


RDW    14.7 H  (11.5-14.5)  %


 


Plt Count    131  (120.0-450.0)  10^3/uL


 


MPV    9.6  (7.0-11.0)  fl


 


Neut % (Auto)    69.5 H  (50.0-68.0)  %


 


Lymph % (Auto)    16.4 L  (22.0-35.0)  %


 


Mono % (Auto)    8.3 H  (1.0-6.0)  %


 


Eos % (Auto)    5.6 H  (1.5-5.0)  %


 


Baso % (Auto)    0.2  (0.0-3.0)  %


 


Lymph # (Auto)    0.8 L  (1.2-3.4)  


 


Mono # (Auto)    0.4  (0.1-0.6)  


 


Eos # (Auto)    0.3  (0.0-0.7)  


 


Baso # (Auto)    0.01  (0.0-2.0)  K/mm3


 


Absolute Neuts (auto)    3.35  (1.4-6.5)  


 


Sodium     (132-148)  mmol/L


 


Potassium     (3.6-5.0)  mmol/L


 


Chloride     ()  mmol/L


 


Carbon Dioxide     (21-33)  mmol/L


 


Anion Gap     (10-20)  


 


BUN     (7-21)  mg/dL


 


Creatinine     (0.8-1.5)  mg/dl


 


Est GFR (African Amer)     


 


Est GFR (Non-Af Amer)     


 


POC Glucose (mg/dL)  122 H  107   ()  mg/dL


 


Random Glucose     ()  mg/dL


 


Calcium     (8.4-10.5)  mg/dL


 


Phosphorus     (2.5-4.5)  mg/dL


 


Magnesium     (1.7-2.2)  mg/dL


 


Total Bilirubin     (0.2-1.3)  mg/dL


 


Direct Bilirubin     (0.0-0.4)  mg/dL


 


AST     (17-59)  U/L


 


ALT     (7-56)  U/L


 


Alkaline Phosphatase     ()  U/L


 


NT-Pro-B Natriuret Pep     (0-450)  pg/mL


 


Total Protein     (5.8-8.3)  g/dL


 


Albumin     (3.0-4.8)  g/dL


 


Globulin     gm/dL


 


Albumin/Globulin Ratio     (1.1-1.8)  














  02/16/19 02/16/19 02/15/19 Range/Units





  05:30 00:08 21:46 


 


WBC     (4.5-11.0)  10^3/uL


 


RBC     (3.5-6.1)  10^6/uL


 


Hgb     (14.0-18.0)  g/dL


 


Hct     (42.0-52.0)  %


 


MCV     (80.0-105.0)  fl


 


MCH     (25.0-35.0)  pg


 


MCHC     (31.0-37.0)  g/dl


 


RDW     (11.5-14.5)  %


 


Plt Count     (120.0-450.0)  10^3/uL


 


MPV     (7.0-11.0)  fl


 


Neut % (Auto)     (50.0-68.0)  %


 


Lymph % (Auto)     (22.0-35.0)  %


 


Mono % (Auto)     (1.0-6.0)  %


 


Eos % (Auto)     (1.5-5.0)  %


 


Baso % (Auto)     (0.0-3.0)  %


 


Lymph # (Auto)     (1.2-3.4)  


 


Mono # (Auto)     (0.1-0.6)  


 


Eos # (Auto)     (0.0-0.7)  


 


Baso # (Auto)     (0.0-2.0)  K/mm3


 


Absolute Neuts (auto)     (1.4-6.5)  


 


Sodium  141    (132-148)  mmol/L


 


Potassium  3.7    (3.6-5.0)  mmol/L


 


Chloride  110 H    ()  mmol/L


 


Carbon Dioxide  28    (21-33)  mmol/L


 


Anion Gap  7 L    (10-20)  


 


BUN  20    (7-21)  mg/dL


 


Creatinine  1.2    (0.8-1.5)  mg/dl


 


Est GFR (African Amer)  > 60    


 


Est GFR (Non-Af Amer)  58    


 


POC Glucose (mg/dL)   109  68  ()  mg/dL


 


Random Glucose  99    ()  mg/dL


 


Calcium  8.5    (8.4-10.5)  mg/dL


 


Phosphorus  2.6    (2.5-4.5)  mg/dL


 


Magnesium  2.3 H    (1.7-2.2)  mg/dL


 


Total Bilirubin  0.6    (0.2-1.3)  mg/dL


 


Direct Bilirubin  0.4    (0.0-0.4)  mg/dL


 


AST  27    (17-59)  U/L


 


ALT  28    (7-56)  U/L


 


Alkaline Phosphatase  54    ()  U/L


 


NT-Pro-B Natriuret Pep  1580 H    (0-450)  pg/mL


 


Total Protein  5.5 L    (5.8-8.3)  g/dL


 


Albumin  2.8 L    (3.0-4.8)  g/dL


 


Globulin  2.6    gm/dL


 


Albumin/Globulin Ratio  1.1    (1.1-1.8)  














  02/15/19 Range/Units





  16:49 


 


WBC   (4.5-11.0)  10^3/uL


 


RBC   (3.5-6.1)  10^6/uL


 


Hgb   (14.0-18.0)  g/dL


 


Hct   (42.0-52.0)  %


 


MCV   (80.0-105.0)  fl


 


MCH   (25.0-35.0)  pg


 


MCHC   (31.0-37.0)  g/dl


 


RDW   (11.5-14.5)  %


 


Plt Count   (120.0-450.0)  10^3/uL


 


MPV   (7.0-11.0)  fl


 


Neut % (Auto)   (50.0-68.0)  %


 


Lymph % (Auto)   (22.0-35.0)  %


 


Mono % (Auto)   (1.0-6.0)  %


 


Eos % (Auto)   (1.5-5.0)  %


 


Baso % (Auto)   (0.0-3.0)  %


 


Lymph # (Auto)   (1.2-3.4)  


 


Mono # (Auto)   (0.1-0.6)  


 


Eos # (Auto)   (0.0-0.7)  


 


Baso # (Auto)   (0.0-2.0)  K/mm3


 


Absolute Neuts (auto)   (1.4-6.5)  


 


Sodium   (132-148)  mmol/L


 


Potassium   (3.6-5.0)  mmol/L


 


Chloride   ()  mmol/L


 


Carbon Dioxide   (21-33)  mmol/L


 


Anion Gap   (10-20)  


 


BUN   (7-21)  mg/dL


 


Creatinine   (0.8-1.5)  mg/dl


 


Est GFR (African Amer)   


 


Est GFR (Non-Af Amer)   


 


POC Glucose (mg/dL)  304 H  ()  mg/dL


 


Random Glucose   ()  mg/dL


 


Calcium   (8.4-10.5)  mg/dL


 


Phosphorus   (2.5-4.5)  mg/dL


 


Magnesium   (1.7-2.2)  mg/dL


 


Total Bilirubin   (0.2-1.3)  mg/dL


 


Direct Bilirubin   (0.0-0.4)  mg/dL


 


AST   (17-59)  U/L


 


ALT   (7-56)  U/L


 


Alkaline Phosphatase   ()  U/L


 


NT-Pro-B Natriuret Pep   (0-450)  pg/mL


 


Total Protein   (5.8-8.3)  g/dL


 


Albumin   (3.0-4.8)  g/dL


 


Globulin   gm/dL


 


Albumin/Globulin Ratio   (1.1-1.8)  








                         Laboratory Results - last 24 hr











  02/15/19 02/15/19 02/16/19





  16:49 21:46 00:08


 


WBC   


 


RBC   


 


Hgb   


 


Hct   


 


MCV   


 


MCH   


 


MCHC   


 


RDW   


 


Plt Count   


 


MPV   


 


Neut % (Auto)   


 


Lymph % (Auto)   


 


Mono % (Auto)   


 


Eos % (Auto)   


 


Baso % (Auto)   


 


Lymph # (Auto)   


 


Mono # (Auto)   


 


Eos # (Auto)   


 


Baso # (Auto)   


 


Absolute Neuts (auto)   


 


Sodium   


 


Potassium   


 


Chloride   


 


Carbon Dioxide   


 


Anion Gap   


 


BUN   


 


Creatinine   


 


Est GFR ( Amer)   


 


Est GFR (Non-Af Amer)   


 


POC Glucose (mg/dL)  304 H  68  109


 


Random Glucose   


 


Calcium   


 


Phosphorus   


 


Magnesium   


 


Total Bilirubin   


 


Direct Bilirubin   


 


AST   


 


ALT   


 


Alkaline Phosphatase   


 


NT-Pro-B Natriuret Pep   


 


Total Protein   


 


Albumin   


 


Globulin   


 


Albumin/Globulin Ratio   














  02/16/19 02/16/19 02/16/19





  05:30 05:30 07:42


 


WBC   4.8 


 


RBC   3.72 


 


Hgb   11.4 L 


 


Hct   35.0 L 


 


MCV   94.1 


 


MCH   30.6 


 


MCHC   32.6 


 


RDW   14.7 H 


 


Plt Count   131 


 


MPV   9.6 


 


Neut % (Auto)   69.5 H 


 


Lymph % (Auto)   16.4 L 


 


Mono % (Auto)   8.3 H 


 


Eos % (Auto)   5.6 H 


 


Baso % (Auto)   0.2 


 


Lymph # (Auto)   0.8 L 


 


Mono # (Auto)   0.4 


 


Eos # (Auto)   0.3 


 


Baso # (Auto)   0.01 


 


Absolute Neuts (auto)   3.35 


 


Sodium  141  


 


Potassium  3.7  


 


Chloride  110 H  


 


Carbon Dioxide  28  


 


Anion Gap  7 L  


 


BUN  20  


 


Creatinine  1.2  


 


Est GFR ( Amer)  > 60  


 


Est GFR (Non-Af Amer)  58  


 


POC Glucose (mg/dL)    107


 


Random Glucose  99  


 


Calcium  8.5  


 


Phosphorus  2.6  


 


Magnesium  2.3 H  


 


Total Bilirubin  0.6  


 


Direct Bilirubin  0.4  


 


AST  27  


 


ALT  28  


 


Alkaline Phosphatase  54  


 


NT-Pro-B Natriuret Pep  1580 H  


 


Total Protein  5.5 L  


 


Albumin  2.8 L  


 


Globulin  2.6  


 


Albumin/Globulin Ratio  1.1  














  02/16/19





  11:28


 


WBC 


 


RBC 


 


Hgb 


 


Hct 


 


MCV 


 


MCH 


 


MCHC 


 


RDW 


 


Plt Count 


 


MPV 


 


Neut % (Auto) 


 


Lymph % (Auto) 


 


Mono % (Auto) 


 


Eos % (Auto) 


 


Baso % (Auto) 


 


Lymph # (Auto) 


 


Mono # (Auto) 


 


Eos # (Auto) 


 


Baso # (Auto) 


 


Absolute Neuts (auto) 


 


Sodium 


 


Potassium 


 


Chloride 


 


Carbon Dioxide 


 


Anion Gap 


 


BUN 


 


Creatinine 


 


Est GFR ( Amer) 


 


Est GFR (Non-Af Amer) 


 


POC Glucose (mg/dL)  122 H


 


Random Glucose 


 


Calcium 


 


Phosphorus 


 


Magnesium 


 


Total Bilirubin 


 


Direct Bilirubin 


 


AST 


 


ALT 


 


Alkaline Phosphatase 


 


NT-Pro-B Natriuret Pep 


 


Total Protein 


 


Albumin 


 


Globulin 


 


Albumin/Globulin Ratio 














Critical Care Progress Note





- Nutrition


Nutrition: 


                                    Nutrition











 Category Date Time Status


 


 Liquid Diet [DIET] Diets  02/15/19 Lunch Ordered














Addendum


Addendum: 





02/16/19 16:47


ICU Attending Addendum 


Patient seen and examined. Case reviewed on round with housestaff.  Agree with 

resident note above with the following additions/exceptions





85M PMHx HTN, CAD s/p CABG, T2DM, HLD, and asthma found to have NSTEMI s/p  

placement of three bare-metal stent in the mid- and proximal RCA


Complicated by lower gi bleed


s/p colonscopy yesterday AM 2/15 showing diverticulosis and AVM





CAD


CABG


s/p PCI


DMII


HLD


lower gibleed





hemodynamically stable


resume asa today


holding Plavix 


resume BB coreg 12.5 BID


resume ramoirpil at lower dose 5mg for now





GI consulted, Dr Dobbs


Cardiology follow up





doubt infection, woudl stop abx 


abx per ID





Rest of care as above in housestaff note





Jesus Arias MD


Pulmonary Critical Care Attending

## 2019-02-17 NOTE — PN
DATE:  02/17/2019



COVERING FOR:  Onel Ann MD.



SUBJECTIVE:  No report _____ bleeding today.  The patient is noted to have

frequent PVCs.



PHYSICAL EXAMINATION:

VITAL SIGNS:  Blood pressure 154/84, heart rate 91, temperature 97.4,

respirations 18.

HEENT:  Normocephalic.

CHEST:  Clear.

HEART:  S1, S2.  Regular.

ABDOMEN:  Soft.

EXTREMITIES:  Trace leg edema.



LABORATORY DATA:  EKG on the 13th of this month revealed sinus bradycardia

at a rate of 57, consider bilateral ischemia.



The patient's most recent cardiac catheterization on the 13th four days ago

revealed patent LIMA to LAD, occluded saphenous vein graft to RCA, occluded

circumflex artery, ejection fraction 25.  The patient underwent PCI with

bare metal stent to the mid RCA and proximal RCA.



ASSESSMENT:

1.  Recent gastrointestinal bleeding.

2.  Status post recent percutaneous coronary intervention to the right

coronary artery with bare metal stent.

3.  Diabetes mellitus.

4.  Systolic heart failure.



RECOMMENDATIONS:  Continue Altace 5 mg in a.m. and 10 mg in p.m., continue

aspirin 81 mg once a day, Coreg 12.5 mg once a day, doxycycline 100 mg

intravenously every 12 hours, IV Flagyl 500 mg every 8 hours, K-Dur 20 mEq

daily, Lipitor 40 mg once a day, Synthroid 25 mcg once a day, milrinone

infusion.  Resume Plavix if the patient is cleared from the GI point of

view.







__________________________________________

Mark Markham MD





DD:  02/17/2019 11:38:48

DT:  02/17/2019 13:43:51

Job # [id number]

## 2019-02-17 NOTE — PN
DATE:  02/16/2019SUBJECTIVE:  The patient is seen lying in the CCU bed 1. 

Overnight nurse's notes were reviewed.  The patient yesterday was found to

have a large amount of black bowel movement.  The patient had episode of

hypoglycemia.  The patient slept well overnight.



PHYSICAL EXAMINATION:

GENERAL:  The patient was seen lying in the bed.  The patient was not in

any distress.

VITAL SIGNS:  T-max in the last 24-48 hours 98.6 and 98.7.  Telemetry shows

sinus rhythm.  Heart rate 70s to 80s.  Blood pressure 124/54, 142/65,

136/86, 131/57, 138/64, 136/65, 141/64.  Respiration has been in mid 20s. 

O2 sat 100%.  Intake and output:  Today's output is 1250.

HEAD:  Normocephalic and atraumatic.

HEENT:  Shows pinkish pale conjunctivae, anicteric sclerae, dry oral

mucosa.

NECK:  No neck rigidity.

CHEST:  Kyphosis.

LUNGS:  Show positive rhonchi bilaterally.  Decreased breath sounds at the

bases.

CARDIOVASCULAR:  S1 and S2, regular rhythm.  Positive systolic murmur in

left sternal border, right second intercostal space, left second

intercostal space.

ABDOMEN:  Soft.  Positive bowel sounds.  No palpable hepatosplenomegaly.

GENITALIA:  Male.

RECTAL:  Deferred.

EXTREMITIES:  Show no pitting edema, no calf tenderness, no Homans' sign.

NEUROLOGICAL:  The patient is alert, awake, responsive.  He is able to move

upper and lower extremities without assistance.  Gait examination is not

tested.



DIAGNOSTIC DATA:  From 02/16/2019, hemoglobin and hematocrit 11.4 and 35,

platelet 131, granulocytes 69.  The patient's hemoglobin and hematocrit in

the last 24-48 hours have been averaging around 11.5 g and hematocrit

around 35 to 36.  Platelet count is also in 130s to 140s.  PT yesterday was

15.8.  Fingerstick blood sugars 122, 107, 99, 109, 68, 304.  Chemistry

shows sodium of 141, potassium 3.7, chloride 110, CO2 of 28, anion gap 7,

BUN 20, creatinine 1.2, GFR greater than 60, glucose 99, calcium 8.5,

phosphorus 2.6, magnesium 2.3.  LFTs are normal.  ProBNP is down to 1580. 

Highest ProBNP was 13,700, down to 1580.  Total protein 5.5, albumin 2.8. 

Blood, urine, MRSA cultures are negative.  The patient received 1 unit of

PRBC.  The patient had an endoscopy result which was reviewed.



IMPRESSION AND PLAN:

1.  Acute non-ST elevation myocardial infarction with elevated troponin.

2.  Status post cardiac catheterization, percutaneous transluminal coronary

angioplasty, and bare-metal stent of the right coronary artery.

3.  Dilated ischemic cardiomyopathy with left ventricular ejection fraction

of 25% with diffusely dilated and diffusely hypokinetic left ventricle.

4.  A 70% to 80% stenosis of the proximal right coronary artery extending

into the midportion.

5.  A 99% stenosis of the mid right coronary artery.

6.  Long critical 80% to 90% stenosis of the diffusely diseased posterior

descending artery.

7.  Proximal occlusion of left anterior descending artery.

8.  Subtotal proximal occlusion of left circumflex artery.

9.  Left internal mammary artery to left anterior descending artery patent

and antegrade flow to the mid and distal left anterior descending artery.

10.  Diffuse atherosclerosis and long 50% to 60% stenosis of the midportion

of the left anterior descending artery after the anastomotic site.

11.  Saphenous vein graft to right coronary artery occluded.

12.  Status post successful angioplasty and three bare-metal stent

placements of the mid right coronary artery and proximal right coronary

artery.

13.  Status post successful angioplasty and stent placement of the multiple

lesions of the right coronary artery with bare-metal stent placement.

14.  Severe triple-vessel coronary artery disease.

15.  Acute systolic and diastolic congestive heart failure with elevated

ProBNP.

16.  Acute blood loss anemia with lower gastrointestinal bleeding and

bright red blood per rectum and hypotension.

17.  Transient hypovolemic hypotensive shock secondary to acute blood loss

anemia secondary to gastrointestinal bleeding.

18.  Leukopenia.

19.  Acute blood loss anemia with decreasing hemoglobin from 14.4 to 11.4.

20.  Mild coagulopathy.

21.  Lactic acidosis.

22.  Hypokalemia.

23.  Uncontrolled non-insulin requiring diabetes mellitus with hemoglobin

A1c of 7.1 and elevated fructosamine of 290.

24.  Mild protein malnutrition and hypoalbuminemia.

25.  Uncontrolled non-insulin requiring diabetes mellitus with elevated

hemoglobin A1c .

26.  Microscopic hematuria and bacteriuria.

27.  Status post packed red blood cell transfusion x1.

28.  Rectosigmoid colon active gastrointestinal bleeding.

29.  Left sided multilobar pneumonia.

30.  Cardiomegaly with coronary artery calcification.

31.  Aortic calcification.

32.  Moderate bilateral pleural effusion, left more than the right.

33.  Degenerative changes and ossification of the anterior longitudinal

ligament.

34.  Questionable delirium.

35.  Cerebral cortical atrophy of the brain with microvascular white matter

ischemic disease of the brain.

36.  Gait dysfunction.

37.  Deconditioning.

38.  Right inguinal hernia containing bowel loop.

39.  Prostatomegaly.

40.  L4-L5 calcification.

41.  Concentric left ventricular hypertrophy.

42.  Hypertensive cardiovascular disease.

43.  Lateral coronary ischemic changes.

44.  Left ventricular ejection fraction of 20% to 25% with right

ventricular systolic pressure of 70 mmHg.

45.  Severe pulmonary hypertension with severe tricuspid regurgitation and

right ventricular systolic pressure of 70 mmHg.

46.  Severely impaired left ventricular ejection fraction and global left

ventricular hypokinesis.

47.  Mildly dilated right ventricle.

48.  Severely dilated left and right atrium.

49.  Moderate aortic regurgitation and mildly sclerotic aortic valve.

50.  Reduced mitral valve leaflet separation and decreased flow through the

mitral valve.

51.  Mildly thickened mitral valve.

52.  Severe mitral regurgitation.

53.  Mild pulmonic regurgitation.

54.  Large left pleural effusion.

55.  Dilated cardiomyopathy with four-chamber dilatation.

56.  Severe left ventricular systolic dysfunction and global hypokinesis.

57.  Status post colonoscopy.

58.  Sigmoid colonic diverticulosis.

59.  Descending colon and cecum multiple large angiodysplastic lesions

without bleeding.

60.  Internal hemorrhoid.

61.  History of coronary artery disease, coronary artery bypass graft.

62.  Neurosis.

63.  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

congestive heart failure.

64.  Hypothyroidism.



PLAN:  At this time, the patient is awaiting evaluation by Cardiology for

clearance for transfer out of the ICU.  Serial labs have been ordered for

the morning.  Current consultation:  Cardiology, Surgery, Gastroenterology.

The patient has been ordered Mucomyst and Xopenex nebulizers, Altace 5 mg

daily, aspirin 81 mg daily, Colace 100 mg three times a day, Coreg 12.5 mg

twice a day, doxycycline 100 mg IV every 12 hours, ferrous sulfate 324 mg

three times a day, Flagyl 500 mg IV every 8 hours, regular insulin sliding

scale coverage before meals and at bedtime, potassium increased to 20 mEq

daily because of low normal potassium, Lasix 40 mg IV daily, Lipitor 40 mg

daily, magnesium oxide 400 mg twice a day, Pepcid 40 mg at bedtime,

Rocephin 1 g IV daily, milrinone drip at 0.375 mcg/kg per minute, Protonix

40 mg IV every 12 hours, Rocephin 1 g IV daily, Synthroid 25 mcg daily,

Tylenol 650 mg p.o. suppository every 6 hours.  The patient's IV Protonix

will be stopped.  The patient will be continued on Pepcid 40 mg daily,

Rocephin 1 g IV daily, Synthroid 25 mcg daily.  The patient is on Xopenex

nebulizer, Zofran 4 mg IV every 4 hours.  The patient has been ordered

chest PT, incentive spirometry, oxygen nasal cannula.  The patient has been

ordered out of bed, physical therapy, occupational therapy.  At present,

the patient will be continued to be monitored very closely.  We will await

further cardiology recommendations regarding transferring of the patient to

the telemetry floor.



GI recommends holding Plavix for 72 hours.  We will leave the decision

about resuming Plavix up to Cardiology and Gastroenterology.  Overall, the

patient's prognosis is guarded secondary to advanced age and multiple

comorbidities and acute deterioration of the patient's medical condition. 

The patient's family and the next of kin, the patient's nephew, Geovanni Degroot, have been updated about the patient's condition and overall

guarded prognosis which he acknowledged understanding.  All questions

concerned were answered.



Dictated and electronically signed, not read.







__________________________________________

Сергей Harris MD





DD:  02/16/2019 19:57:32

DT:  02/16/2019 20:06:22

Job # [id number]

## 2019-02-17 NOTE — CP.CCUPN
<Azra Lew - Last Filed: 02/17/19 13:27>





CCU Subjective





- Physician Review


Subjective (Free Text): 





02/17/19 09:39


Azra Pena PGY1, ICU Progress Note for Dr. Arias:





Pt seen and examined at bedside in ICU this AM. Pt is resting comfortably. Pt 

had 1 BM yesterday and denies it being bloody or dark, confirmed with nursing. 

Pt denies any lightheadedness, dizziness, SOB, chest pain, hematemsis, 

hematochezia or melena. No other acute complaints at this time.








CCU Objective





- Vital Signs / Intake & Output


Vital Signs (Last 4 hours): 


Vital Signs











  Temp Pulse Resp BP Pulse Ox


 


 02/17/19 08:00  97.4 F L  73  18  122/48 L  95


 


 02/17/19 06:00   65   











Intake and Output (Last 8hrs): 


                                 Intake & Output











 02/16/19 02/17/19 02/17/19





 22:59 06:59 14:59


 


Intake Total 1130  


 


Output Total 500  


 


Balance 630  


 


Weight   138 lb 14.259 oz


 


Intake:   


 


    


 


    Left Antecubital 300  


 


    Right Forearm 90  


 


  Oral 640  


 


Output:   


 


  Urine 500  


 


    Urine, Voided 500  


 


Other:   


 


  # Bowel Movements 1  














- Physical Exam


Head: Positive for: Atraumatic, Normocephalic


Pupils: Positive for: PERRL


Extroacular Muscles: Positive for: EOMI


Conjunctiva: Positive for: Normal


Mouth: Positive for: Moist Mucous Membranes


Neck: Positive for: Normal Range of Motion


Respiratory/Chest: Positive for: Clear to Auscultation, Good Air Exchange.  

Negative for: Respiratory Distress, Accessory Muscle Use


Cardiovascular: Positive for: Regular Rate and Rhythm, Normal S1, S2, Other.  

Negative for: Murmurs


Abdomen: Positive for: Tenderness (LLQ tenderness), Distention.  Negative for: 

Rebound, Guarding


Back: Positive for: Normal Inspection


Upper Extremity: Positive for: Normal Inspection.  Negative for: Cyanosis, Edema


Lower Extremity: Positive for: Edema (Pitting edema)


Neurological: Positive for: GCS=15, Speech Normal


Skin: Positive for: Warm, Dry, Normal Color.  Negative for: Rashes


Psychiatric: Positive for: Alert, Oriented x 3, Normal Insight, Normal Con

centration





- Medications


Active Medications: 


Active Medications











Generic Name Dose Route Start Last Admin





  Trade Name Freq  PRN Reason Stop Dose Admin


 


Acetaminophen  650 mg  02/12/19 11:06  





  Tylenol 325mg Tab  PO   





  Q6 PRN   





  TEMP>=99.5F   





     





     





     


 


Acetaminophen  650 mg  02/12/19 11:06  





  Tylenol 650 Mg Supp  RC   





  Q6H PRN   





  TEMP>=99.5F   





     





     





     


 


Acetylcysteine  4 ml  02/12/19 09:30  02/17/19 07:51





  Acetylcysteine 20%  IH   4 ml





  Q8 GRISELDA   Administration





     





     





     





     


 


Aspirin  81 mg  02/16/19 10:00  02/16/19 10:02





  Aspirin Chewable  PO   81 mg





  DAILY GRISELDA   Administration





     





     





     





     


 


Atorvastatin Calcium  40 mg  02/12/19 17:00  02/16/19 17:12





  Lipitor  PO   40 mg





  DIN GRISELDA   Administration





     





     





     





     


 


Carvedilol  12.5 mg  02/12/19 10:00  02/16/19 17:14





  Coreg  PO   12.5 mg





  BID GRISELDA   Administration





     





     





     





     


 


Dextrose  0 ml  02/12/19 11:06  02/15/19 21:50





  Dextrose 50% Inj  IV   50 ml





  STAT PRN   Administration





  Hypoglycemia Protocol   





     





  Protocol   





     


 


Docusate Sodium  100 mg  02/12/19 14:00  02/16/19 17:12





  Colace  PO   100 mg





  TID GRISELDA   Administration





     





     





     





     


 


Famotidine  40 mg  02/12/19 22:00  02/16/19 22:25





  Pepcid  PO   40 mg





  HS GRISELDA   Administration





     





     





     





     


 


Ferrous Sulfate  324 mg  02/15/19 10:00  02/16/19 17:13





  Feosol  PO   324 mg





  TID GRISELDA   Administration





     





     





     





     


 


Furosemide  40 mg  02/14/19 10:00  02/16/19 10:01





  Lasix  IVP   40 mg





  DAILY GRISLEDA   Administration





     





     





     





     


 


Doxycycline Hyclate 100 mg/  100 mls @ 100 mls/hr  02/12/19 10:00  02/16/19 

22:35





  Sodium Chloride  IVPB   100 mls/hr





  Q12 GRISELDA   Administration





     





     





  Protocol   





     


 


Ceftriaxone Sodium  1 gm in 100 mls @ 100 mls/hr  02/12/19 10:00  02/16/19 10:03





  Rocephin 1 Gram Ivpb  IVPB   100 mls/hr





  DAILY GRISELDA   Administration





     





     





  Protocol   





     


 


Dextrose  1,000 mls @ 0 mls/hr  02/12/19 11:06  





  Dextrose 5% In Water 1000 Ml  IV   





  .Q0M PRN   





  Hypoglycemia Protocol   





     





  Protocol   





  Per Protocol   


 


Metronidazole  500 mg in 100 mls @ 100 mls/hr  02/12/19 14:00  02/17/19 06:19





  Flagyl  IVPB   100 mls/hr





  Q8 GRISELDA   Administration





     





     





  Protocol   





     


 


Milrinone Lactate/Dextrose  100 mls @ 8.93 mls/hr  02/13/19 14:33  02/16/19 

20:54





  Primacor 20mg/100ml D5w  IV   0.375 mcg/kg/min





  [id number] PRN   8.93 mls/hr





  TITRATE PER MD ORDER   Administration





     





  Protocol   





  0.375 MCG/KG/MIN   


 


Insulin Human Regular  0 units  02/15/19 11:30  02/16/19 22:10





  Humulin R Low  SC   Not Given





  ACHS GRISELDA   





     





     





  Protocol   





     


 


Levalbuterol HCl  0.63 mg  02/11/19 23:24  





  Xopenex  IH   





  [id number] PRN   





  Shortness of Breath   





     





     





     


 


Levalbuterol HCl  0.63 mg  02/17/19 01:26  02/17/19 07:51





  Xopenex  IH   0.63 mg





  TIDRESP GRIESLDA   Administration





     





     





     





     


 


Levothyroxine Sodium  25 mcg  02/12/19 06:00  02/17/19 06:19





  Synthroid  PO   25 mcg





  0600 GRISELDA   Administration





     





     





     





     


 


Magnesium Oxide  400 mg  02/14/19 10:00  02/16/19 17:14





  Mag-Ox  PO   400 mg





  BID GRISELDA   Administration





     





     





     





     


 


Ondansetron HCl  4 mg  02/12/19 11:06  





  Zofran Inj  IVP   





  Q4H PRN   





  Nausea/Vomiting   





     





     





     


 


Potassium Chloride  20 meq  02/17/19 08:00  





  K-Dur 20 Meq Er Tab  PO   





  BRK GRISELDA   





     





     





     





     


 


Ramipril  5 mg  02/16/19 14:00  02/16/19 14:40





  Altace  PO   5 mg





  DAILY GRISELDA   Administration





     





     





     





     














- Patient Studies


Lab Studies: 


                              Microbiology Studies











 02/12/19 10:30 Blood Culture - Preliminary





 Blood-Venous    NO GROWTH AFTER 4 DAYS


 


 02/12/19 10:00 Blood Culture - Preliminary





 Blood-Venous    NO GROWTH AFTER 4 DAYS








                                   Lab Studies











  02/17/19 02/17/19 02/16/19 Range/Units





  05:15 05:15 22:04 


 


WBC  4.2 L    (4.5-11.0)  10^3/uL


 


RBC  3.65    (3.5-6.1)  10^6/uL


 


Hgb  11.2 L    (14.0-18.0)  g/dL


 


Hct  34.6 L    (42.0-52.0)  %


 


MCV  94.8    (80.0-105.0)  fl


 


MCH  30.7    (25.0-35.0)  pg


 


MCHC  32.4    (31.0-37.0)  g/dl


 


RDW  14.2    (11.5-14.5)  %


 


Plt Count  134    (120.0-450.0)  10^3/uL


 


MPV  10.0    (7.0-11.0)  fl


 


Neut % (Auto)  69.6 H    (50.0-68.0)  %


 


Lymph % (Auto)  19.0 L    (22.0-35.0)  %


 


Mono % (Auto)  6.9 H    (1.0-6.0)  %


 


Eos % (Auto)  4.3    (1.5-5.0)  %


 


Baso % (Auto)  0.2    (0.0-3.0)  %


 


Lymph # (Auto)  0.8 L    (1.2-3.4)  


 


Mono # (Auto)  0.3    (0.1-0.6)  


 


Eos # (Auto)  0.2    (0.0-0.7)  


 


Baso # (Auto)  0.01    (0.0-2.0)  K/mm3


 


Absolute Neuts (auto)  2.92    (1.4-6.5)  


 


Sodium   141   (132-148)  mmol/L


 


Potassium   3.8   (3.6-5.0)  mmol/L


 


Chloride   107   ()  mmol/L


 


Carbon Dioxide   29   (21-33)  mmol/L


 


Anion Gap   9 L   (10-20)  


 


BUN   19   (7-21)  mg/dL


 


Creatinine   1.1   (0.8-1.5)  mg/dl


 


Est GFR (African Amer)   > 60   


 


Est GFR (Non-Af Amer)   > 60   


 


POC Glucose (mg/dL)    86  ()  mg/dL


 


Random Glucose   93   ()  mg/dL


 


Calcium   8.5   (8.4-10.5)  mg/dL


 


Phosphorus   2.7   (2.5-4.5)  mg/dL


 


Magnesium   2.1   (1.7-2.2)  mg/dL


 


Total Bilirubin   0.6   (0.2-1.3)  mg/dL


 


Direct Bilirubin   0.4   (0.0-0.4)  mg/dL


 


AST   31   (17-59)  U/L


 


ALT   29   (7-56)  U/L


 


Alkaline Phosphatase   48   ()  U/L


 


NT-Pro-B Natriuret Pep   2280 H   (0-450)  pg/mL


 


Total Protein   5.3 L   (5.8-8.3)  g/dL


 


Albumin   2.7 L   (3.0-4.8)  g/dL


 


Globulin   2.6   gm/dL


 


Albumin/Globulin Ratio   1.0 L   (1.1-1.8)  


 


Crossmatch     














  02/16/19 02/16/19 02/16/19 Range/Units





  16:22 11:28 07:42 


 


WBC     (4.5-11.0)  10^3/uL


 


RBC     (3.5-6.1)  10^6/uL


 


Hgb     (14.0-18.0)  g/dL


 


Hct     (42.0-52.0)  %


 


MCV     (80.0-105.0)  fl


 


MCH     (25.0-35.0)  pg


 


MCHC     (31.0-37.0)  g/dl


 


RDW     (11.5-14.5)  %


 


Plt Count     (120.0-450.0)  10^3/uL


 


MPV     (7.0-11.0)  fl


 


Neut % (Auto)     (50.0-68.0)  %


 


Lymph % (Auto)     (22.0-35.0)  %


 


Mono % (Auto)     (1.0-6.0)  %


 


Eos % (Auto)     (1.5-5.0)  %


 


Baso % (Auto)     (0.0-3.0)  %


 


Lymph # (Auto)     (1.2-3.4)  


 


Mono # (Auto)     (0.1-0.6)  


 


Eos # (Auto)     (0.0-0.7)  


 


Baso # (Auto)     (0.0-2.0)  K/mm3


 


Absolute Neuts (auto)     (1.4-6.5)  


 


Sodium     (132-148)  mmol/L


 


Potassium     (3.6-5.0)  mmol/L


 


Chloride     ()  mmol/L


 


Carbon Dioxide     (21-33)  mmol/L


 


Anion Gap     (10-20)  


 


BUN     (7-21)  mg/dL


 


Creatinine     (0.8-1.5)  mg/dl


 


Est GFR (African Amer)     


 


Est GFR (Non-Af Amer)     


 


POC Glucose (mg/dL)  172 H  122 H  107  ()  mg/dL


 


Random Glucose     ()  mg/dL


 


Calcium     (8.4-10.5)  mg/dL


 


Phosphorus     (2.5-4.5)  mg/dL


 


Magnesium     (1.7-2.2)  mg/dL


 


Total Bilirubin     (0.2-1.3)  mg/dL


 


Direct Bilirubin     (0.0-0.4)  mg/dL


 


AST     (17-59)  U/L


 


ALT     (7-56)  U/L


 


Alkaline Phosphatase     ()  U/L


 


NT-Pro-B Natriuret Pep     (0-450)  pg/mL


 


Total Protein     (5.8-8.3)  g/dL


 


Albumin     (3.0-4.8)  g/dL


 


Globulin     gm/dL


 


Albumin/Globulin Ratio     (1.1-1.8)  


 


Crossmatch     














  02/13/19 Range/Units





  21:45 


 


WBC   (4.5-11.0)  10^3/uL


 


RBC   (3.5-6.1)  10^6/uL


 


Hgb   (14.0-18.0)  g/dL


 


Hct   (42.0-52.0)  %


 


MCV   (80.0-105.0)  fl


 


MCH   (25.0-35.0)  pg


 


MCHC   (31.0-37.0)  g/dl


 


RDW   (11.5-14.5)  %


 


Plt Count   (120.0-450.0)  10^3/uL


 


MPV   (7.0-11.0)  fl


 


Neut % (Auto)   (50.0-68.0)  %


 


Lymph % (Auto)   (22.0-35.0)  %


 


Mono % (Auto)   (1.0-6.0)  %


 


Eos % (Auto)   (1.5-5.0)  %


 


Baso % (Auto)   (0.0-3.0)  %


 


Lymph # (Auto)   (1.2-3.4)  


 


Mono # (Auto)   (0.1-0.6)  


 


Eos # (Auto)   (0.0-0.7)  


 


Baso # (Auto)   (0.0-2.0)  K/mm3


 


Absolute Neuts (auto)   (1.4-6.5)  


 


Sodium   (132-148)  mmol/L


 


Potassium   (3.6-5.0)  mmol/L


 


Chloride   ()  mmol/L


 


Carbon Dioxide   (21-33)  mmol/L


 


Anion Gap   (10-20)  


 


BUN   (7-21)  mg/dL


 


Creatinine   (0.8-1.5)  mg/dl


 


Est GFR (African Amer)   


 


Est GFR (Non-Af Amer)   


 


POC Glucose (mg/dL)   ()  mg/dL


 


Random Glucose   ()  mg/dL


 


Calcium   (8.4-10.5)  mg/dL


 


Phosphorus   (2.5-4.5)  mg/dL


 


Magnesium   (1.7-2.2)  mg/dL


 


Total Bilirubin   (0.2-1.3)  mg/dL


 


Direct Bilirubin   (0.0-0.4)  mg/dL


 


AST   (17-59)  U/L


 


ALT   (7-56)  U/L


 


Alkaline Phosphatase   ()  U/L


 


NT-Pro-B Natriuret Pep   (0-450)  pg/mL


 


Total Protein   (5.8-8.3)  g/dL


 


Albumin   (3.0-4.8)  g/dL


 


Globulin   gm/dL


 


Albumin/Globulin Ratio   (1.1-1.8)  


 


Crossmatch  See Detail  








                         Laboratory Results - last 24 hr











  02/13/19 02/16/19 02/16/19





  21:45 07:42 11:28


 


WBC   


 


RBC   


 


Hgb   


 


Hct   


 


MCV   


 


MCH   


 


MCHC   


 


RDW   


 


Plt Count   


 


MPV   


 


Neut % (Auto)   


 


Lymph % (Auto)   


 


Mono % (Auto)   


 


Eos % (Auto)   


 


Baso % (Auto)   


 


Lymph # (Auto)   


 


Mono # (Auto)   


 


Eos # (Auto)   


 


Baso # (Auto)   


 


Absolute Neuts (auto)   


 


Sodium   


 


Potassium   


 


Chloride   


 


Carbon Dioxide   


 


Anion Gap   


 


BUN   


 


Creatinine   


 


Est GFR ( Amer)   


 


Est GFR (Non-Af Amer)   


 


POC Glucose (mg/dL)   107  122 H


 


Random Glucose   


 


Calcium   


 


Phosphorus   


 


Magnesium   


 


Total Bilirubin   


 


Direct Bilirubin   


 


AST   


 


ALT   


 


Alkaline Phosphatase   


 


NT-Pro-B Natriuret Pep   


 


Total Protein   


 


Albumin   


 


Globulin   


 


Albumin/Globulin Ratio   


 


Crossmatch  See Detail  














  02/16/19 02/16/19 02/17/19





  16:22 22:04 05:15


 


WBC   


 


RBC   


 


Hgb   


 


Hct   


 


MCV   


 


MCH   


 


MCHC   


 


RDW   


 


Plt Count   


 


MPV   


 


Neut % (Auto)   


 


Lymph % (Auto)   


 


Mono % (Auto)   


 


Eos % (Auto)   


 


Baso % (Auto)   


 


Lymph # (Auto)   


 


Mono # (Auto)   


 


Eos # (Auto)   


 


Baso # (Auto)   


 


Absolute Neuts (auto)   


 


Sodium    141


 


Potassium    3.8


 


Chloride    107


 


Carbon Dioxide    29


 


Anion Gap    9 L


 


BUN    19


 


Creatinine    1.1


 


Est GFR ( Amer)    > 60


 


Est GFR (Non-Af Amer)    > 60


 


POC Glucose (mg/dL)  172 H  86 


 


Random Glucose    93


 


Calcium    8.5


 


Phosphorus    2.7


 


Magnesium    2.1


 


Total Bilirubin    0.6


 


Direct Bilirubin    0.4


 


AST    31


 


ALT    29


 


Alkaline Phosphatase    48


 


NT-Pro-B Natriuret Pep    2280 H


 


Total Protein    5.3 L


 


Albumin    2.7 L


 


Globulin    2.6


 


Albumin/Globulin Ratio    1.0 L


 


Crossmatch   














  02/17/19





  05:15


 


WBC  4.2 L


 


RBC  3.65


 


Hgb  11.2 L


 


Hct  34.6 L


 


MCV  94.8


 


MCH  30.7


 


MCHC  32.4


 


RDW  14.2


 


Plt Count  134


 


MPV  10.0


 


Neut % (Auto)  69.6 H


 


Lymph % (Auto)  19.0 L


 


Mono % (Auto)  6.9 H


 


Eos % (Auto)  4.3


 


Baso % (Auto)  0.2


 


Lymph # (Auto)  0.8 L


 


Mono # (Auto)  0.3


 


Eos # (Auto)  0.2


 


Baso # (Auto)  0.01


 


Absolute Neuts (auto)  2.92


 


Sodium 


 


Potassium 


 


Chloride 


 


Carbon Dioxide 


 


Anion Gap 


 


BUN 


 


Creatinine 


 


Est GFR ( Amer) 


 


Est GFR (Non-Af Amer) 


 


POC Glucose (mg/dL) 


 


Random Glucose 


 


Calcium 


 


Phosphorus 


 


Magnesium 


 


Total Bilirubin 


 


Direct Bilirubin 


 


AST 


 


ALT 


 


Alkaline Phosphatase 


 


NT-Pro-B Natriuret Pep 


 


Total Protein 


 


Albumin 


 


Globulin 


 


Albumin/Globulin Ratio 


 


Crossmatch 











Fingerstick Blood Sugar Results: 86





Critical Care Progress Note





- Nutrition


Nutrition: 


                                    Nutrition











 Category Date Time Status


 


 Liquid Diet [DIET] Diets  02/15/19 Lunch Ordered














Assessment/Plan





- Assessment and Plan (Free Text)


Assessment: 





86 yo M with PMH of HTN, CAD s/p CABG, T2DM, HLD, and [place] [facility] [place] and underwent cardiac catherization with 3 BMS placed in the mid and 

proximal RCA. Patient is transferred to the ICU due to active GI bleed. Patient 

currently hemodynamically stable, will continue to monitor.


Plan: 





Neuro


- AOx3


- Maintain normothermia





Cardio:


HFrEF


- EF noted to be 20-25% during cath


- continue lasix


 


CAD s/p stents


- B-david and ACEi restarted


- Asa resumed yesterday, plavix resumed today per GI recs


- Continue to monitor closely for signs of GI bleed as now DAPT is resumed.





Pulmonary HTN


- currently on Milrinone gtt


- Maintain MAP > 65


- Cardiology following





Pulm


Maintain O2 sat > 92%





GI


Blood per rectum s/p colonoscopy 


- Scope revealed diverticula, internal hemorrhoids and AVMs with no active bleed


- per GI, bleeding may be 2/2 DAPT initially


- Resume ASA 2/16, resume plavix 2/17 - 2/18 per GI recs


- Protonix BID


- Clear liquid diet, advance per GI recs





Renal


Replete K


Cont to monitor electrolytes, replete as needed


Maintain euvolemia





Heme


Blood per rectum


hemodynamically stable


recheck coags


monitor BP





ID


ADILSON pneumonia


IV abx per primary





Endo


DM2


ISS-Low


Accuchecks


Maintain euglycemia





Case seen, examined and discussed with attending physician, Dr. Juana Lew PGY1





<Jesus Arias - Last Filed: 02/17/19 17:00>





CCU Objective





- Vital Signs / Intake & Output


Vital Signs (Last 4 hours): 


Vital Signs











  Pulse


 


 02/17/19 14:00  81











Intake and Output (Last 8hrs): 


                                 Intake & Output











 02/17/19 02/17/19 02/17/19





 06:59 14:59 22:59


 


Intake Total  100 300


 


Balance  100 300


 


Weight  63 kg 


 


Intake:   


 


  IV  100 300


 


    Left Antecubital   300














- Medications


Active Medications: 


Active Medications











Generic Name Dose Route Start Last Admin





  Trade Name Freq  PRN Reason Stop Dose Admin


 


Acetaminophen  650 mg  02/12/19 11:06  





  Tylenol 325mg Tab  PO   





  Q6 PRN   





  TEMP>=99.5F   





     





     





     


 


Acetaminophen  650 mg  02/12/19 11:06  





  Tylenol 650 Mg Supp  RC   





  Q6H PRN   





  TEMP>=99.5F   





     





     





     


 


Acetylcysteine  4 ml  02/12/19 09:30  02/17/19 13:18





  Acetylcysteine 20%  IH   4 ml





  Q8 GRISELDA   Administration





     





     





     





     


 


Aspirin  81 mg  02/16/19 10:00  02/17/19 10:18





  Aspirin Chewable  PO   81 mg





  DAILY GRISELDA   Administration





     





     





     





     


 


Atorvastatin Calcium  40 mg  02/12/19 17:00  02/16/19 17:12





  Lipitor  PO   40 mg





  DIN GRISELDA   Administration





     





     





     





     


 


Carvedilol  12.5 mg  02/12/19 10:00  02/17/19 10:18





  Coreg  PO   12.5 mg





  BID GRISELDA   Administration





     





     





     





     


 


Clopidogrel Bisulfate  75 mg  02/17/19 13:30  02/17/19 15:19





  Plavix  PO   75 mg





  DAILY GRISELDA   Administration





     





     





     





     


 


Dextrose  0 ml  02/12/19 11:06  02/15/19 21:50





  Dextrose 50% Inj  IV   50 ml





  STAT PRN   Administration





  Hypoglycemia Protocol   





     





  Protocol   





     


 


Docusate Sodium  100 mg  02/12/19 14:00  02/17/19 14:25





  Colace  PO   100 mg





  TID GRISELDA   Administration





     





     





     





     


 


Famotidine  40 mg  02/12/19 22:00  02/16/19 22:25





  Pepcid  PO   40 mg





  HS GRISELDA   Administration





     





     





     





     


 


Ferrous Sulfate  324 mg  02/15/19 10:00  02/17/19 14:25





  Feosol  PO   324 mg





  TID GRISELDA   Administration





     





     





     





     


 


Furosemide  40 mg  02/14/19 10:00  02/17/19 10:15





  Lasix  IVP   40 mg





  DAILY GRISELDA   Administration





     





     





     





     


 


Dextrose  1,000 mls @ 0 mls/hr  02/12/19 11:06  





  Dextrose 5% In Water 1000 Ml  IV   





  .Q0M PRN   





  Hypoglycemia Protocol   





     





  Protocol   





  Per Protocol   


 


Milrinone Lactate/Dextrose  100 mls @ 8.93 mls/hr  02/13/19 14:33  02/17/19 

10:31





  Primacor 20mg/100ml D5w  IV   0.375 mcg/kg/min





  .[id number] PRN   8.93 mls/hr





  TITRATE PER MD ORDER   Administration





     





  Protocol   





  0.375 MCG/KG/MIN   


 


Insulin Human Regular  0 units  02/15/19 11:30  02/17/19 12:00





  Humulin R Low  SC   2 u





  ACHS GRISELDA   Administration





     





     





  Protocol   





     


 


Levalbuterol HCl  0.63 mg  02/11/19 23:24  





  Xopenex  IH   





  [id number] PRN   





  Shortness of Breath   





     





     





     


 


Levalbuterol HCl  0.63 mg  02/17/19 01:26  02/17/19 13:18





  Xopenex  IH   0.63 mg





  TIDRESP GRISLEDA   Administration





     





     





     





     


 


Levothyroxine Sodium  25 mcg  02/12/19 06:00  02/17/19 06:19





  Synthroid  PO   25 mcg





  0600 GRISELDA   Administration





     





     





     





     


 


Magnesium Oxide  400 mg  02/14/19 10:00  02/17/19 10:18





  Mag-Ox  PO   400 mg





  BID GRISELDA   Administration





     





     





     





     


 


Ondansetron HCl  4 mg  02/12/19 11:06  





  Zofran Inj  IVP   





  Q4H PRN   





  Nausea/Vomiting   





     





     





     


 


Potassium Chloride  20 meq  02/17/19 08:00  02/17/19 10:23





  K-Dur 20 Meq Er Tab  PO   20 meq





  BRK GRISELDA   Administration





     





     





     





     


 


Ramipril  5 mg  02/16/19 14:00  02/17/19 10:39





  Altace  PO   5 mg





  DAILY GRISELDA   Administration





     





     





     





     


 


Ramipril  10 mg  02/18/19 10:00  





  Altace  PO   





  DAILY GRISELDA   





     





     





     





     














- Patient Studies


Lab Studies: 


                              Microbiology Studies











 02/12/19 10:30 Blood Culture - Final





 Blood-Venous    NO GROWTH AFTER 5 DAYS





 Gram Stain - Final





    TEST NOT PERFORMED


 


 02/12/19 10:00 Blood Culture - Final





 Blood-Venous    NO GROWTH AFTER 5 DAYS





 Gram Stain - Final





    TEST NOT PERFORMED








                                   Lab Studies











  02/17/19 02/17/19 02/17/19 Range/Units





  11:18 07:37 05:15 


 


WBC    4.2 L  (4.5-11.0)  10^3/uL


 


RBC    3.65  (3.5-6.1)  10^6/uL


 


Hgb    11.2 L  (14.0-18.0)  g/dL


 


Hct    34.6 L  (42.0-52.0)  %


 


MCV    94.8  (80.0-105.0)  fl


 


MCH    30.7  (25.0-35.0)  pg


 


MCHC    32.4  (31.0-37.0)  g/dl


 


RDW    14.2  (11.5-14.5)  %


 


Plt Count    134  (120.0-450.0)  10^3/uL


 


MPV    10.0  (7.0-11.0)  fl


 


Neut % (Auto)    69.6 H  (50.0-68.0)  %


 


Lymph % (Auto)    19.0 L  (22.0-35.0)  %


 


Mono % (Auto)    6.9 H  (1.0-6.0)  %


 


Eos % (Auto)    4.3  (1.5-5.0)  %


 


Baso % (Auto)    0.2  (0.0-3.0)  %


 


Lymph # (Auto)    0.8 L  (1.2-3.4)  


 


Mono # (Auto)    0.3  (0.1-0.6)  


 


Eos # (Auto)    0.2  (0.0-0.7)  


 


Baso # (Auto)    0.01  (0.0-2.0)  K/mm3


 


Absolute Neuts (auto)    2.92  (1.4-6.5)  


 


Sodium     (132-148)  mmol/L


 


Potassium     (3.6-5.0)  mmol/L


 


Chloride     ()  mmol/L


 


Carbon Dioxide     (21-33)  mmol/L


 


Anion Gap     (10-20)  


 


BUN     (7-21)  mg/dL


 


Creatinine     (0.8-1.5)  mg/dl


 


Est GFR (African Amer)     


 


Est GFR (Non-Af Amer)     


 


POC Glucose (mg/dL)  226 H  95   ()  mg/dL


 


Random Glucose     ()  mg/dL


 


Calcium     (8.4-10.5)  mg/dL


 


Phosphorus     (2.5-4.5)  mg/dL


 


Magnesium     (1.7-2.2)  mg/dL


 


Total Bilirubin     (0.2-1.3)  mg/dL


 


Direct Bilirubin     (0.0-0.4)  mg/dL


 


AST     (17-59)  U/L


 


ALT     (7-56)  U/L


 


Alkaline Phosphatase     ()  U/L


 


NT-Pro-B Natriuret Pep     (0-450)  pg/mL


 


Total Protein     (5.8-8.3)  g/dL


 


Albumin     (3.0-4.8)  g/dL


 


Globulin     gm/dL


 


Albumin/Globulin Ratio     (1.1-1.8)  


 


Crossmatch     














  02/17/19 02/16/19 02/16/19 Range/Units





  05:15 22:04 16:22 


 


WBC     (4.5-11.0)  10^3/uL


 


RBC     (3.5-6.1)  10^6/uL


 


Hgb     (14.0-18.0)  g/dL


 


Hct     (42.0-52.0)  %


 


MCV     (80.0-105.0)  fl


 


MCH     (25.0-35.0)  pg


 


MCHC     (31.0-37.0)  g/dl


 


RDW     (11.5-14.5)  %


 


Plt Count     (120.0-450.0)  10^3/uL


 


MPV     (7.0-11.0)  fl


 


Neut % (Auto)     (50.0-68.0)  %


 


Lymph % (Auto)     (22.0-35.0)  %


 


Mono % (Auto)     (1.0-6.0)  %


 


Eos % (Auto)     (1.5-5.0)  %


 


Baso % (Auto)     (0.0-3.0)  %


 


Lymph # (Auto)     (1.2-3.4)  


 


Mono # (Auto)     (0.1-0.6)  


 


Eos # (Auto)     (0.0-0.7)  


 


Baso # (Auto)     (0.0-2.0)  K/mm3


 


Absolute Neuts (auto)     (1.4-6.5)  


 


Sodium  141    (132-148)  mmol/L


 


Potassium  3.8    (3.6-5.0)  mmol/L


 


Chloride  107    ()  mmol/L


 


Carbon Dioxide  29    (21-33)  mmol/L


 


Anion Gap  9 L    (10-20)  


 


BUN  19    (7-21)  mg/dL


 


Creatinine  1.1    (0.8-1.5)  mg/dl


 


Est GFR (African Amer)  > 60    


 


Est GFR (Non-Af Amer)  > 60    


 


POC Glucose (mg/dL)   86  172 H  ()  mg/dL


 


Random Glucose  93    ()  mg/dL


 


Calcium  8.5    (8.4-10.5)  mg/dL


 


Phosphorus  2.7    (2.5-4.5)  mg/dL


 


Magnesium  2.1    (1.7-2.2)  mg/dL


 


Total Bilirubin  0.6    (0.2-1.3)  mg/dL


 


Direct Bilirubin  0.4    (0.0-0.4)  mg/dL


 


AST  31    (17-59)  U/L


 


ALT  29    (7-56)  U/L


 


Alkaline Phosphatase  48    ()  U/L


 


NT-Pro-B Natriuret Pep  2280 H    (0-450)  pg/mL


 


Total Protein  5.3 L    (5.8-8.3)  g/dL


 


Albumin  2.7 L    (3.0-4.8)  g/dL


 


Globulin  2.6    gm/dL


 


Albumin/Globulin Ratio  1.0 L    (1.1-1.8)  


 


Crossmatch     














  02/13/19 Range/Units





  21:45 


 


WBC   (4.5-11.0)  10^3/uL


 


RBC   (3.5-6.1)  10^6/uL


 


Hgb   (14.0-18.0)  g/dL


 


Hct   (42.0-52.0)  %


 


MCV   (80.0-105.0)  fl


 


MCH   (25.0-35.0)  pg


 


MCHC   (31.0-37.0)  g/dl


 


RDW   (11.5-14.5)  %


 


Plt Count   (120.0-450.0)  10^3/uL


 


MPV   (7.0-11.0)  fl


 


Neut % (Auto)   (50.0-68.0)  %


 


Lymph % (Auto)   (22.0-35.0)  %


 


Mono % (Auto)   (1.0-6.0)  %


 


Eos % (Auto)   (1.5-5.0)  %


 


Baso % (Auto)   (0.0-3.0)  %


 


Lymph # (Auto)   (1.2-3.4)  


 


Mono # (Auto)   (0.1-0.6)  


 


Eos # (Auto)   (0.0-0.7)  


 


Baso # (Auto)   (0.0-2.0)  K/mm3


 


Absolute Neuts (auto)   (1.4-6.5)  


 


Sodium   (132-148)  mmol/L


 


Potassium   (3.6-5.0)  mmol/L


 


Chloride   ()  mmol/L


 


Carbon Dioxide   (21-33)  mmol/L


 


Anion Gap   (10-20)  


 


BUN   (7-21)  mg/dL


 


Creatinine   (0.8-1.5)  mg/dl


 


Est GFR (African Amer)   


 


Est GFR (Non-Af Amer)   


 


POC Glucose (mg/dL)   ()  mg/dL


 


Random Glucose   ()  mg/dL


 


Calcium   (8.4-10.5)  mg/dL


 


Phosphorus   (2.5-4.5)  mg/dL


 


Magnesium   (1.7-2.2)  mg/dL


 


Total Bilirubin   (0.2-1.3)  mg/dL


 


Direct Bilirubin   (0.0-0.4)  mg/dL


 


AST   (17-59)  U/L


 


ALT   (7-56)  U/L


 


Alkaline Phosphatase   ()  U/L


 


NT-Pro-B Natriuret Pep   (0-450)  pg/mL


 


Total Protein   (5.8-8.3)  g/dL


 


Albumin   (3.0-4.8)  g/dL


 


Globulin   gm/dL


 


Albumin/Globulin Ratio   (1.1-1.8)  


 


Crossmatch  See Detail  








                         Laboratory Results - last 24 hr











  02/13/19 02/16/19 02/16/19





  21:45 16:22 22:04


 


WBC   


 


RBC   


 


Hgb   


 


Hct   


 


MCV   


 


MCH   


 


MCHC   


 


RDW   


 


Plt Count   


 


MPV   


 


Neut % (Auto)   


 


Lymph % (Auto)   


 


Mono % (Auto)   


 


Eos % (Auto)   


 


Baso % (Auto)   


 


Lymph # (Auto)   


 


Mono # (Auto)   


 


Eos # (Auto)   


 


Baso # (Auto)   


 


Absolute Neuts (auto)   


 


Sodium   


 


Potassium   


 


Chloride   


 


Carbon Dioxide   


 


Anion Gap   


 


BUN   


 


Creatinine   


 


Est GFR ( Amer)   


 


Est GFR (Non-Af Amer)   


 


POC Glucose (mg/dL)   172 H  86


 


Random Glucose   


 


Calcium   


 


Phosphorus   


 


Magnesium   


 


Total Bilirubin   


 


Direct Bilirubin   


 


AST   


 


ALT   


 


Alkaline Phosphatase   


 


NT-Pro-B Natriuret Pep   


 


Total Protein   


 


Albumin   


 


Globulin   


 


Albumin/Globulin Ratio   


 


Crossmatch  See Detail  














  02/17/19 02/17/19 02/17/19





  05:15 05:15 07:37


 


WBC   4.2 L 


 


RBC   3.65 


 


Hgb   11.2 L 


 


Hct   34.6 L 


 


MCV   94.8 


 


MCH   30.7 


 


MCHC   32.4 


 


RDW   14.2 


 


Plt Count   134 


 


MPV   10.0 


 


Neut % (Auto)   69.6 H 


 


Lymph % (Auto)   19.0 L 


 


Mono % (Auto)   6.9 H 


 


Eos % (Auto)   4.3 


 


Baso % (Auto)   0.2 


 


Lymph # (Auto)   0.8 L 


 


Mono # (Auto)   0.3 


 


Eos # (Auto)   0.2 


 


Baso # (Auto)   0.01 


 


Absolute Neuts (auto)   2.92 


 


Sodium  141  


 


Potassium  3.8  


 


Chloride  107  


 


Carbon Dioxide  29  


 


Anion Gap  9 L  


 


BUN  19  


 


Creatinine  1.1  


 


Est GFR ( Amer)  > 60  


 


Est GFR (Non-Af Amer)  > 60  


 


POC Glucose (mg/dL)    95


 


Random Glucose  93  


 


Calcium  8.5  


 


Phosphorus  2.7  


 


Magnesium  2.1  


 


Total Bilirubin  0.6  


 


Direct Bilirubin  0.4  


 


AST  31  


 


ALT  29  


 


Alkaline Phosphatase  48  


 


NT-Pro-B Natriuret Pep  2280 H  


 


Total Protein  5.3 L  


 


Albumin  2.7 L  


 


Globulin  2.6  


 


Albumin/Globulin Ratio  1.0 L  


 


Crossmatch   














  02/17/19





  11:18


 


WBC 


 


RBC 


 


Hgb 


 


Hct 


 


MCV 


 


MCH 


 


MCHC 


 


RDW 


 


Plt Count 


 


MPV 


 


Neut % (Auto) 


 


Lymph % (Auto) 


 


Mono % (Auto) 


 


Eos % (Auto) 


 


Baso % (Auto) 


 


Lymph # (Auto) 


 


Mono # (Auto) 


 


Eos # (Auto) 


 


Baso # (Auto) 


 


Absolute Neuts (auto) 


 


Sodium 


 


Potassium 


 


Chloride 


 


Carbon Dioxide 


 


Anion Gap 


 


BUN 


 


Creatinine 


 


Est GFR ( Amer) 


 


Est GFR (Non-Af Amer) 


 


POC Glucose (mg/dL)  226 H


 


Random Glucose 


 


Calcium 


 


Phosphorus 


 


Magnesium 


 


Total Bilirubin 


 


Direct Bilirubin 


 


AST 


 


ALT 


 


Alkaline Phosphatase 


 


NT-Pro-B Natriuret Pep 


 


Total Protein 


 


Albumin 


 


Globulin 


 


Albumin/Globulin Ratio 


 


Crossmatch 














Critical Care Progress Note





- Nutrition


Nutrition: 


                                    Nutrition











 Category Date Time Status


 


 Altered GI/Hepatic Diet [DIET] Diets  02/17/19 Dinner Ordered














Addendum


Addendum: 





02/17/19 16:59


ICU Attending Addendum 


Patient seen and examined. Case reviewed on round with housestaff.  Agree with 

resident note above with the following additions/exceptions





85M PMHx HTN, CAD s/p CABG, T2DM, HLD, and asthma found to have NSTEMI s/p  

placement of three bare-metal stent in the mid- and proximal RCA


Complicated by lower gi bleed


s/p colonscopy yesterday AM 2/15 showing diverticulosis and AVM





CAD


CABG


s/p PCI


DMII


HLD


lower gibleed





hemodynamically stable


resume asa yesterday - no signs of bleeding


resume plavix today


on BB coreg 12.5 BID


resume ramirpil at lower dose 5mg yest, tolerated now will increase to 10mg 

starting today





GI consulted, Dr Dobbs


Cardiology follow up





doubt infection, woudl stop abx 


abx per ID





monitor for any bleeding and hemopdynamics


if stable will transfer out of ICU tomorrow





Rest of care as above in housestaff note





Jesus Arias MD


Pulmonary Critical Care Attending

## 2019-02-17 NOTE — PN
DATE:  02/17/2019



SUBJECTIVE:  The patient is seen in ICU, bed 1.  The patient is seen lying

in the bed with the patient's nephew at bedside.  The patient is alert,

awake, responsive, and in no distress.  Most of the history was _____

through the patient's nephew.  The patient denies any chest pain.  Denies

shortness of breath.  Denies nausea.  Denies vomiting.  Denies hemoptysis. 

Denies rectal bleeding.  Denies palpitations.  Denies chest pain.  The

patient's overnight nurse's notes were reviewed.



OBJECTIVE:

VITAL SIGNS:  T-max 98.3, telemetry shows sinus rhythm, heart rate 81 and

89, blood pressure 122/72, 154/84, 122/48, 134/67, 120/56, 124/54, and

142/65, respiratory rate averaging around mid-to-high 20s per minute, and

average O2 sat is mid-to-high 90s.  Intake and output, output is only 500

as per the intake output chart.

GENERAL:  The patient is seen sitting up in the bed.

HEENT:  Head; normocephalic and atraumatic.  HEENT examination shows

pinkish pale conjunctivae.  Anicteric sclerae.  No oropharyngeal lesion.

NECK:  No neck rigidity.

CHEST:  Kyphosis.

LUNGS:  Positive median sternotomy surgical scar noted.  No audible

crackle, rales or wheezing.  Decreased breath sound at the bases.  Positive

rhonchi bilaterally, left more than the right.

CARDIOVASCULAR:  S1 and S2, regular rhythm.  Questionable positive murmur

left sternal border, right second intercostal space, left second

intercostal space.

ABDOMEN:  Soft.  Positive bowel sounds.  No palpable hepatosplenomegaly.

GENITALIA:  Male.

RECTAL:  Deferred.

EXTREMITIES:  Shows no pitting edema.  No calf tenderness.  No Oleg's

signs.

MUSCULOSKELETAL:  Shows a body mass index of 20.  Cranial nerves II through

XII limited.  Gait examination is not tested.



DIAGNOSTIC DATA:  On 02/17/2019; WBC 4.2, hemoglobin and hematocrit 11.2

and 34.6, and platelet 134.  Granulocytes 69%segs.  CMP and LFTs shows

glucose of 226, rest of the CMP and LFTs are normal.  BNP is 2280.  Total

protein 5.3 and albumin 2.7.  BNP has gone up from 1580 to 2280 today. 

MRSA cultures nondetected.  The patient received 1 unit of PRBC.



IMPRESSION AND PLAN:

1.  Acute non-ST elevation myocardial infarction.

2.  Status post cardiac catheterization and _____ status post successful

angioplasty and bare-metal stent placement of the multiple right coronary

artery lesions.

3.  Multivessel coronary artery disease.

4.  Dilated ischemic cardiomyopathy.

5.  Left ventricular ejection fraction of 25%.

6.  Systolic congestive heart failure.

7.  Lower gastrointestinal bleeding.

8.  Acute blood loss anemia.

9.  Sigmoid colon diverticulosis.

10.  Cecal and descending colon, multiple large angiodysplastic lesion

without bleeding.

11.  Internal hemorrhoids.

12.  Leukopenia.

13.  Granulocytosis.

14.  Mild coagulopathy.

15.  Transient lactic acidosis.

16.  Hypokalemia.

17.  Hypomagnesemia.

18.  Acute systolic congestive heart failure with elevated ProBNP.

19.  Mild protein malnutrition and hypoalbuminemia.

20.  Hypovitaminosis D.

21.  Microscopic hematuria.

22.  Bacteriuria.

23.  Status post packed red blood cell transfusion x1.

24.  Left upper lobe and left lower lobe infiltrate and consolidation with

small effusion.

25.  Cardiomegaly.

26.  Coronary artery calcification.

27.  Moderate left pleural effusion and small right pleural effusion.

28.  Anterior longitudinal ligament, degenerative changes _____.

29.  Hypothyroidism.

30.  History of hypertension.

31.  Hyperlipidemia.

32.  IV milrinone-dependent dilated ischemic cardiomyopathy and acute

systolic congestive heart failure.



Plan at this time, the patient was seen in ICU.  The patient has been

ordered serial labs.  The patient has been requested by Gastroenterology

and Cardiology for clearance for transfer out of ICU.  Repeat labs ordered.



CURRENT CONSULTATIONS:

1.  Cardiology.

2.  Surgery.

3.  GI.



CURRENT MEDICATIONS:  Mucomyst nebulizer 20% 4 mL every 8 hours, ramipril

dose was increased from 5 mg to 10 mg by the intensivist, aspirin 81 mg

daily, Colace 100 mg three times a day, Coreg 12.5 twice a day,

hypoglycemia protocol, ferrous sulfate 324 three times a day, Humulin

low-dose sliding scale coverage a.c. and at bedtime, K-Dur 20 mEq daily,

Lasix 40 mg IV daily, Lipitor 40 mg daily, magnesium oxide 400 mg twice a

day, Pepcid 40 mg at bedtime, Plavix 75 mg daily, Primacor, milrinone drip

at 0.375 mcg/kg/minute, Synthroid 25 mcg daily, Tylenol p.r.n., Xopenex

nebulizer every 6 hours and every 3 hours has been ordered, and Zofran 4 mg

IV every 4 hours p.r.n.  Liquid diet has been ordered by GI, out of bed to

chair, physical therapy, and occupational therapy ordered.



The patient's next of kin Geovanni and the patient's nephew updated about the

patient's condition, diagnosis, test results, recommendation by all

physician involved in the care of the patient and overall guarded prognosis

was discussed and explained to the patient's nephew, Geovanni at length and all

questions concerned answered at length.



Dictated and electronically signed, not read.







__________________________________________

Сергей Harris MD



DD:  02/17/2019 16:11:18

DT:  02/17/2019 16:15:28

Job # [id number]

## 2019-02-18 NOTE — US
PROCEDURE:  Left upper extremity venous ultrasound



HISTORY:

Arm pain and swelling. Evaluate for deep venous thrombosis.



PHYSICIAN(S):  Onel Martinez MD.



FINDINGS:

The visualized leftinternal jugular vein is sonographically normal 

and compressible. No evidence of obstruction or thrombus is seen. 



The visualized segments of the left subclavian vein are patent with 

normal waveforms. No sonographic evidence of obstruction or 

thrombosis is seen. 



The visualized deep venous system of the proximal leftupper extremity 

is sonographically normal and compressible.



IMPRESSION:

1. No sonographic evidence for deep venous thrombosis in the 

visualized segments of the left upper extremity.

## 2019-02-18 NOTE — CP.PCM.PN
Subjective





- Date & Time of Evaluation


Date of Evaluation: 02/18/19


Time of Evaluation: 08:24





- Subjective


Subjective: 





PGY-3 for Dr Kimberly SIMMONS no blood. No acute complaint





Objective





- Vital Signs/Intake and Output


Vital Signs (last 24 hours): 


                                        











Temp Pulse Resp BP Pulse Ox


 


 98.8 F   64   18   126/74   100 


 


 02/18/19 04:00  02/18/19 06:00  02/17/19 16:00  02/18/19 00:30  02/17/19 16:00








Intake and Output: 


                                        











 02/18/19 02/18/19





 06:59 18:59


 


Intake Total 448 


 


Output Total 400 


 


Balance 48 














- Medications


Medications: 


                               Current Medications





Acetaminophen (Tylenol 325mg Tab)  650 mg PO Q6 PRN


   PRN Reason: TEMP>=99.5F


Acetaminophen (Tylenol 650 Mg Supp)  650 mg RC Q6H PRN


   PRN Reason: TEMP>=99.5F


Acetylcysteine (Acetylcysteine 20%)  4 ml IH BIDRESP [facility]


Aspirin (Aspirin Chewable)  81 mg PO DAILY [facility]


   Last Admin: 02/17/19 10:18 Dose:  81 mg


Atorvastatin Calcium (Lipitor)  40 mg PO DIN [facility]


   Last Admin: 02/17/19 18:07 Dose:  40 mg


Carvedilol (Coreg)  12.5 mg PO BID [facility]


   Last Admin: 02/17/19 18:07 Dose:  12.5 mg


Clopidogrel Bisulfate (Plavix)  75 mg PO DAILY [facility]


   Last Admin: 02/17/19 15:19 Dose:  75 mg


Dextrose (Dextrose 50% Inj)  0 ml IV STAT PRN; Protocol


   PRN Reason: Hypoglycemia Protocol


   Last Admin: 02/15/19 21:50 Dose:  50 ml


Docusate Sodium (Colace)  100 mg PO TID [facility]


   Last Admin: 02/17/19 18:07 Dose:  100 mg


Famotidine (Pepcid)  40 mg PO HS [facility]


   Last Admin: 02/17/19 21:48 Dose:  40 mg


Ferrous Sulfate (Feosol)  324 mg PO TID [facility]


   Last Admin: 02/17/19 18:07 Dose:  324 mg


Furosemide (Lasix)  40 mg IVP DAILY [facility]


   Last Admin: 02/17/19 10:15 Dose:  40 mg


Dextrose (Dextrose 5% In Water 1000 Ml)  1,000 mls @ 0 mls/hr IV .Q0M PRN; 

Protocol


   PRN Reason: Hypoglycemia Protocol


Milrinone Lactate/Dextrose (Primacor 20mg/100ml D5w)  100 mls @ 8.93 mls/hr IV 

[id number] PRN; Protocol


   PRN Reason: TITRATE PER MD ORDER


   Last Admin: 02/18/19 00:30 Dose:  0.375 mcg/kg/min, 8.93 mls/hr


Potassium Phosphate 15 mmole/ (Dextrose)  255 mls @ 42.5 mls/hr IVPB ONCE ONE


   Stop: 02/18/19 14:11


Insulin Human Regular (Humulin R Low)  0 units SC ACHS [facility]; Protocol


   Last Admin: 02/17/19 21:59 Dose:  Not Given


Levalbuterol HCl (Xopenex)  0.63 mg IH [id number] PRN


   PRN Reason: Shortness of Breath


Levalbuterol HCl (Xopenex)  0.63 mg IH TIDRESP [facility]


   Last Admin: 02/17/19 20:40 Dose:  0.63 mg


Levothyroxine Sodium (Synthroid)  25 mcg PO 0600 [facility]


   Last Admin: 02/18/19 05:44 Dose:  25 mcg


Magnesium Oxide (Mag-Ox)  400 mg PO BID [facility]


   Last Admin: 02/17/19 18:07 Dose:  400 mg


Ondansetron HCl (Zofran Inj)  4 mg IVP Q4H PRN


   PRN Reason: Nausea/Vomiting


Potassium Chloride (K-Dur 20 Meq Er Tab)  20 meq PO BID [facility]


Ramipril (Altace)  5 mg PO DAILY [facility]


   Last Admin: 02/17/19 10:39 Dose:  5 mg


Ramipril (Altace)  10 mg PO DAILY [facility]











- Labs


Labs: 


                                        





                                 02/18/19 06:35 





                                 02/18/19 06:35 





                                        











PT  15.8 SECONDS (9.4-12.5)  H  02/15/19  10:00    


 


INR  1.40   02/15/19  10:00    


 


APTT  31.6 Seconds (26.9-38.3)   02/15/19  10:00    














- Constitutional


Appears: No Acute Distress





- Head Exam


Head Exam: ATRAUMATIC, NORMAL INSPECTION, NORMOCEPHALIC





- Eye Exam


Eye Exam: EOMI, Normal appearance, PERRL.  absent: Scleral icterus


Pupil Exam: NORMAL ACCOMODATION





- ENT Exam


ENT Exam: Mucous Membranes Moist





- Neck Exam


Additional comments: 





supple





- Respiratory Exam


Respiratory Exam: Clear to Ausculation Bilateral, NORMAL BREATHING PATTERN





- Cardiovascular Exam


Cardiovascular Exam: REGULAR RHYTHM, +S1, +S2.  absent: Murmur





- GI/Abdominal Exam


GI & Abdominal Exam: Soft, Normal Bowel Sounds.  absent: Tenderness


Additional comments: 





Slight suprapubic tenderness





- Extremities Exam


Extremities Exam: absent: Calf Tenderness


Additional comments: 





L arm swollne





- Back Exam


Back Exam: absent: CVA tenderness (L), CVA tenderness (R)





- Neurological Exam


Neurological Exam: Alert, Awake, CN II-XII Intact





- Psychiatric Exam


Psychiatric exam: Normal Affect, Normal Mood





- Skin


Skin Exam: Dry, Warm





Assessment and Plan





- Assessment and Plan (Free Text)


Plan: 





Mr Galindo, 85M, with PMHx CAD s/p CABG in 1990 on ASA/Plavix, HTN/HLD, DM2 (A1C 

7.1), asthma c/o Chest pain onset at rest associated with nausea, vomiting, 

diaphoresis. Last bowel movement 6 days ago. He was found to have NSTEMI. Cath 

(2/13) revealed EF 20-25. Triple vessel disease (1) SMYTH to LAD patent. (2) L 

Cir closed (3) RCA 99% block (4) bypass graft to RCA close. He receive PCI with 

3 bare metal stent in RCA and started milrinone gtt. Overnight, he was found to 

have BRBPR with active GI bleed upgraded to ICU. Colonoscopy (2/15) showed 

several non-bleeding diverticulosis and angiodysplasia. He was downgraded to t

elemetry on 2/17/18 





L arm swollen - [  ] doppler to r/o DVT





NSTEMI, Unstable angina


Chest Pain - resolved


Triple vessel disease s/p CABG, s/p PCI with  3 bare metal stents


- ASA/Plavix resumed per GI/cardio


- Continue lipitor/coreg/ramipril. zofran prn


[  ] Milrinone gtt just turned off


- Per cardio, no need for long term milrinone, no need for long term access for 

now


- No need for life vest for now





CHF acute on chronic


Pleural effusion b/l (L > R)


Hx CABG


- lasix 40 iv QD





Lower GI bleed - s/p 1u pRBC. H/H stable





Aspiration pnemonitis vs CAP - resolved, completed rocephin/doxy/flagyl


Hx Asthma


- Procalc is low 0.06


- Mucomyst/Xopenex GRISELDA





Had ruled out UTI - Antibiotics covered possible UTI; urine cx negative


Constipation - Colace TID, Dulcolax suppository. Surgery had ruled out obs

truction or bowel incarceration. no plan for surgery


Dysphagia - [ ] Pending swallow study/barium swallow. Aspiration precaution. 

pepsid





Anemia - stable. work up outpatient


DM, A1C 7.1 - ISSS


hypothyrodism - synthroid 


Pain - tramadol PRN


Prophylaxis - pepcid, scd





Imaging:


CT abd/pelvis (2/12): There is a right-sided inguinal hernia that contains a 

loop of bowel. This measures 3.4 x 5.2 cm. There is no associated obstruction.


CT chest (2/12): ADILSON infiltrate, patchy





POA: Mr Geovanni DegrootDominick [phone]


Dispo: TCU





s/r/d/w Dr Harris

## 2019-02-18 NOTE — PN
DATE:  02/18/2019



LOCATION:  The patient is in CCU, bed 1.



SUBJECTIVE:  The patient is seen lying in the bed.  Overnight nurse's notes

were reviewed.  No adverse events were documented, reported or notified.



PHYSICAL EXAMINATION

VITAL SIGNS:  In the last 12-24 hours T-max 98.8, telemetry shows sinus

rhythm, heart rate 64-67, blood pressure 126/74 respirations 20-30 and O2

sat 100%.

HEENT:  Head examination normocephalic and atraumatic.  HEENT examination

shows pinkish pale conjunctivae.  Anicteric sclerae.  No oropharyngeal

lesion.  No neck rigidity.

CHEST:  Kyphosis.  Positive median sternotomy surgical scar.

LUNGS:  Shows positive rhonchi bilaterally, left more than the right. 

Decreased breath sound at the bases, left more than the right.

CARDIOVASCULAR:  Shows S1 and S2, regular rhythm.  Questionable soft

systolic murmur left sternal border, right second intercostal space, left

second intercostal space.

ABDOMEN:  Soft.  Positive bowel sounds.  No palpable hepatosplenomegaly.

GENITALIA:  Male.

RECTAL:  Deferred.

EXTREMITIES:  Shows no pitting edema, no calf tenderness, no Oleg's signs.

NEUROLOGIC:  The patient is alert, awake, responsive, is able to move upper

and lower extremity without assistance.  Gait examination is not tested.

VASCULAR:  Palpable pulses.



DIAGNOSTIC DATA:  February 18, 2019; WBC 4.6, hemoglobin/hematocrit 12.2

and 37.1 with platelet 138.  Granulocytes 72% segs.  Potassium 3.5 and

phosphorus 2.4.  ProBNP 2120.



IMPRESSION:

1.  Acute non-ST elevation myocardial infarction with elevated troponin.

2.  Status post angioplasty and stent placement of the right coronary

artery.

3.  Acute blood loss anemia secondary to lower gastrointestinal bleeding.

4.  Descending and sigmoid colon angiectasia, nonbleeding.

5.  Colonic diverticulosis large _____.

6.  Internal hemorrhoid.

7.  Anemia.

8.  Acute systolic congestive heart failure.

9.  Dilated ischemic cardiomyopathy.

10.  Multivessel coronary artery disease.

11.  History of hypothyroidism.

12.  Normocytic anemia.

13.  Granulocytosis.

14.  Relative thrombocytopenia.

15.  Hypokalemia.

16.  Hypophosphatemia.

17.  Acute systolic congestive heart failure, IV milrinone dependent with

elevated proBNP.

18.  Deconditioning.

19.  Gait dysfunction.

20.  Hyperlipidemia.



PLAN:  At this time, the patient has been ordered to be transferred out of

the ICU to telemetry.  The patient's IV milrinone drip will be continued as

per the cardiology recommendation.  The patient will be continued on GI and

DVT prophylaxis.  The patient is currently being followed by

Gastroenterology and Hematology.  The patient will be continued on

broad-spectrum IV antibiotic and bronchodilators for pneumonia.  The

patient will be continued on all the medications as per the MAR of today

which was reviewed and revised and updated.  The patient will be continued

on bronchodilators and respiratory treatment.  Once the patient is out of

the ICU, the patient will be started on physical therapy, occupational

therapy, ambulation therapy and gait training.



Overall, the patient's prognosis is guarded secondary to advanced age and

multiple complicated comorbidity which has been explained to the patient's

next of kin, nephew, Geovanni.



Dictated and electronically signed, not read.



Signing off Сергей Harris MD.







__________________________________________

Сергей Harris MD





DD:  02/18/2019 10:20:36

DT:  02/18/2019 10:31:05

Job # [id number]

## 2019-02-18 NOTE — PN
DATE:  02/18/2019



SUBJECTIVE:  The patient is lying in bed comfortable.  He has not had any

further rectal bleeding.  He denies chest pain, shortness of breath or

abdominal pain.



PHYSICAL EXAMINATION:

VITAL SIGNS:  Reveal temperature of 97.5, blood pressure 138/59, heart rate

of 69.

HEENT:  Reveal sclerae to be white.  Conjunctivae pink.

NECK:  Supple.

CHEST:  Reveal lungs to be clear.

HEART:  Exam reveals an irregular rate.  He has a 2/6 systolic murmur.

ABDOMEN:  Soft, nontender.  He has a reducible right inguinal hernia.

EXTREMITIES:  Show no edema.



LABORATORY DATA:  Reveal white blood cell count 4.6, hemoglobin 12.2,

platelet count of 138,000.  Chemistries reveal potassium 3.5, blood sugar

127.  BNP 2120.



IMPRESSION:

1.  An 85-year-old male status post recent myocardial infarction with

coronary artery stent placement followed by lower gastrointestinal bleed

with a drop in his hemoglobin.  Colonoscopy revealed no active bleeding,

diverticulosis in the sigmoid colon, and nonbleeding arteriovenous

malformations in the cecum.  His blood count has remained stable.

2.  Cardiomyopathy.

3.  Congestive heart failure.



RECOMMENDATIONS:  The patient was started on Plavix by the medical team

yesterday, will need to observe the patient closely for recurrent GI

bleeding.  I will stop the aspirin due to the high risk of GI bleeding. 

Follow serial hematocrits.







__________________________________________

eS Hill MD





DD:  02/18/2019 11:04:47

DT:  02/18/2019 11:06:47

Job # [id number]

## 2019-02-18 NOTE — CP.CCUPN
<Carter Clifton - Last Filed: 02/18/19 13:54>





CCU Subjective





- Physician Review


Subjective (Free Text): 





CRITICAL CARE PROGRESS NOTE FOR DR. JUANA Clifton PGY1





Pt seen and examined at bedside in ICU this am. No evidence of blood per rectum.

Pt is resting comfortably. Denies 12 point ROS





Currently on Milrinone drip 0.3731 mcg/kg/min











CCU Objective





- Vital Signs / Intake & Output


Vital Signs (Last 4 hours): 


Vital Signs











  Pulse


 


 02/18/19 10:00  69











Intake and Output (Last 8hrs): 


                                 Intake & Output











 02/17/19 02/18/19 02/18/19





 22:59 06:59 14:59


 


Intake Total 507 348 


 


Output Total 575 400 


 


Balance -68 -52 


 


Weight  63.004 kg 


 


Intake:   


 


   108 


 


    Left Antecubital 300  


 


    Right distal forearm 107 108 


 


  Oral  240 


 


Output:   


 


  Urine 575 400 


 


    2-way Urethral 575  


 


    Urine, Voided  400 


 


Other:   


 


  # Bowel Movements 1 1 














- Physical Exam


Head: Positive for: Atraumatic, Normocephalic


Pupils: Positive for: PERRL


Extroacular Muscles: Positive for: EOMI


Conjunctiva: Positive for: Normal


Mouth: Positive for: Moist Mucous Membranes


Neck: Positive for: Normal Range of Motion


Respiratory/Chest: Positive for: Clear to Auscultation, Good Air Exchange.  

Negative for: Respiratory Distress, Accessory Muscle Use


Cardiovascular: Positive for: Regular Rate and Rhythm, Normal S1, S2, Other.  

Negative for: Murmurs


Abdomen: Positive for: Tenderness, Distention.  Negative for: Rebound, Guarding


Back: Positive for: Normal Inspection


Upper Extremity: Positive for: Normal Inspection.  Negative for: Cyanosis, Edema


Lower Extremity: Positive for: Edema (Pitting edema)


Neurological: Positive for: GCS=15, Speech Normal


Skin: Positive for: Warm, Dry, Normal Color.  Negative for: Rashes


Psychiatric: Positive for: Alert, Oriented x 3, Normal Insight, Normal 

Concentration





- Medications


Active Medications: 


Active Medications











Generic Name Dose Route Start Last Admin





  Trade Name Freq  PRN Reason Stop Dose Admin


 


Acetaminophen  650 mg  02/12/19 11:06  





  Tylenol 325mg Tab  PO   





  Q6 PRN   





  TEMP>=99.5F   





     





     





     


 


Acetaminophen  650 mg  02/12/19 11:06  





  Tylenol 650 Mg Supp  RC   





  Q6H PRN   





  TEMP>=99.5F   





     





     





     


 


Acetylcysteine  4 ml  02/18/19 08:00  02/18/19 11:21





  Acetylcysteine 20%  IH   Not Given





  BIDRESP GRISELDA   





     





     





     





     


 


Atorvastatin Calcium  40 mg  02/12/19 17:00  02/17/19 18:07





  Lipitor  PO   40 mg





  DIN GRISELDA   Administration





     





     





     





     


 


Carvedilol  12.5 mg  02/12/19 10:00  02/18/19 09:01





  Coreg  PO   12.5 mg





  BID GRISELDA   Administration





     





     





     





     


 


Clopidogrel Bisulfate  75 mg  02/17/19 13:30  02/18/19 09:03





  Plavix  PO   75 mg





  DAILY GRISELDA   Administration





     





     





     





     


 


Dextrose  0 ml  02/12/19 11:06  02/15/19 21:50





  Dextrose 50% Inj  IV   50 ml





  STAT PRN   Administration





  Hypoglycemia Protocol   





     





  Protocol   





     


 


Docusate Sodium  100 mg  02/12/19 14:00  02/18/19 09:01





  Colace  PO   100 mg





  TID GRISELDA   Administration





     





     





     





     


 


Famotidine  40 mg  02/12/19 22:00  02/17/19 21:48





  Pepcid  PO   40 mg





  HS GRISELDA   Administration





     





     





     





     


 


Ferrous Sulfate  324 mg  02/15/19 10:00  02/18/19 09:02





  Feosol  PO   324 mg





  TID GRISELDA   Administration





     





     





     





     


 


Furosemide  40 mg  02/14/19 10:00  02/18/19 09:39





  Lasix  IVP   40 mg





  DAILY GRISELDA   Administration





     





     





     





     


 


Dextrose  1,000 mls @ 0 mls/hr  02/12/19 11:06  





  Dextrose 5% In Water 1000 Ml  IV   





  .Q0M PRN   





  Hypoglycemia Protocol   





     





  Protocol   





  Per Protocol   


 


Potassium Phosphate 15 mmole/  255 mls @ 42.5 mls/hr  02/18/19 08:12  02/18/19 

08:44





  Dextrose  IVPB  02/18/19 14:11  42.5 mls/hr





  ONCE ONE   Administration





     





     





     





     


 


Insulin Human Regular  0 units  02/15/19 11:30  02/18/19 11:14





  Humulin R Low  SC   2 u





  ACHS GRISELDA   Administration





     





     





  Protocol   





     


 


Levalbuterol HCl  0.63 mg  02/11/19 23:24  





  Xopenex  IH   





  [id number] PRN   





  Shortness of Breath   





     





     





     


 


Levalbuterol HCl  0.63 mg  02/17/19 01:26  02/18/19 11:21





  Xopenex  IH   Not Given





  TIDRESP GRISELDA   





     





     





     





     


 


Levothyroxine Sodium  25 mcg  02/12/19 06:00  02/18/19 05:44





  Synthroid  PO   25 mcg





  0600 GRISELDA   Administration





     





     





     





     


 


Magnesium Oxide  400 mg  02/14/19 10:00  02/18/19 09:03





  Mag-Ox  PO   400 mg





  BID GRISELDA   Administration





     





     





     





     


 


Ondansetron HCl  4 mg  02/12/19 11:06  





  Zofran Inj  IVP   





  Q4H PRN   





  Nausea/Vomiting   





     





     





     


 


Potassium Chloride  20 meq  02/18/19 08:30  02/18/19 10:00





  K-Dur 20 Meq Er Tab  PO   Not Given





  BID GRISELDA   





     





     





     





     


 


Ramipril  10 mg  02/18/19 10:00  02/18/19 09:40





  Altace  PO   10 mg





  DAILY GRISELDA   Administration





     





     





     





     














- Patient Studies


Lab Studies: 


                              Microbiology Studies











 02/12/19 10:30 Blood Culture - Final





 Blood-Venous    NO GROWTH AFTER 5 DAYS





 Gram Stain - Final





    TEST NOT PERFORMED


 


 02/12/19 10:00 Blood Culture - Final





 Blood-Venous    NO GROWTH AFTER 5 DAYS





 Gram Stain - Final





    TEST NOT PERFORMED








                                   Lab Studies











  02/18/19 02/18/19 02/18/19 Range/Units





  07:33 06:35 06:35 


 


WBC   4.6   (4.5-11.0)  10^3/uL


 


RBC   3.99   (3.5-6.1)  10^6/uL


 


Hgb   12.2 L   (14.0-18.0)  g/dL


 


Hct   37.1 L   (42.0-52.0)  %


 


MCV   93.0   (80.0-105.0)  fl


 


MCH   30.6   (25.0-35.0)  pg


 


MCHC   32.9   (31.0-37.0)  g/dl


 


RDW   14.0   (11.5-14.5)  %


 


Plt Count   138   (120.0-450.0)  10^3/uL


 


MPV   9.9   (7.0-11.0)  fl


 


Neut % (Auto)   72.4 H   (50.0-68.0)  %


 


Lymph % (Auto)   14.0 L   (22.0-35.0)  %


 


Mono % (Auto)   8.6 H   (1.0-6.0)  %


 


Eos % (Auto)   5.0   (1.5-5.0)  %


 


Baso % (Auto)   0.0   (0.0-3.0)  %


 


Lymph # (Auto)   0.7 L   (1.2-3.4)  


 


Mono # (Auto)   0.4   (0.1-0.6)  


 


Eos # (Auto)   0.2   (0.0-0.7)  


 


Baso # (Auto)   0.00   (0.0-2.0)  K/mm3


 


Absolute Neuts (auto)   3.36   (1.4-6.5)  


 


Sodium    139  (132-148)  mmol/L


 


Potassium    3.5 L  (3.6-5.0)  mmol/L


 


Chloride    104  ()  mmol/L


 


Carbon Dioxide    27  (21-33)  mmol/L


 


Anion Gap    12  (10-20)  


 


BUN    19  (7-21)  mg/dL


 


Creatinine    1.0  (0.8-1.5)  mg/dl


 


Est GFR (African Amer)    > 60  


 


Est GFR (Non-Af Amer)    > 60  


 


POC Glucose (mg/dL)  128 H    ()  mg/dL


 


Random Glucose    127 H  ()  mg/dL


 


Calcium    8.8  (8.4-10.5)  mg/dL


 


Phosphorus    2.4 L  (2.5-4.5)  mg/dL


 


Magnesium    2.2  (1.7-2.2)  mg/dL


 


Total Bilirubin    0.9  (0.2-1.3)  mg/dL


 


Direct Bilirubin    0.6 H  (0.0-0.4)  mg/dL


 


AST    24  (17-59)  U/L


 


ALT    24  (7-56)  U/L


 


Alkaline Phosphatase    57  ()  U/L


 


NT-Pro-B Natriuret Pep    2120 H  (0-450)  pg/mL


 


Total Protein    5.9  (5.8-8.3)  g/dL


 


Albumin    3.1  (3.0-4.8)  g/dL


 


Globulin    2.8  gm/dL


 


Albumin/Globulin Ratio    1.1  (1.1-1.8)  














  02/17/19 02/17/19 Range/Units





  21:18 16:16 


 


WBC    (4.5-11.0)  10^3/uL


 


RBC    (3.5-6.1)  10^6/uL


 


Hgb    (14.0-18.0)  g/dL


 


Hct    (42.0-52.0)  %


 


MCV    (80.0-105.0)  fl


 


MCH    (25.0-35.0)  pg


 


MCHC    (31.0-37.0)  g/dl


 


RDW    (11.5-14.5)  %


 


Plt Count    (120.0-450.0)  10^3/uL


 


MPV    (7.0-11.0)  fl


 


Neut % (Auto)    (50.0-68.0)  %


 


Lymph % (Auto)    (22.0-35.0)  %


 


Mono % (Auto)    (1.0-6.0)  %


 


Eos % (Auto)    (1.5-5.0)  %


 


Baso % (Auto)    (0.0-3.0)  %


 


Lymph # (Auto)    (1.2-3.4)  


 


Mono # (Auto)    (0.1-0.6)  


 


Eos # (Auto)    (0.0-0.7)  


 


Baso # (Auto)    (0.0-2.0)  K/mm3


 


Absolute Neuts (auto)    (1.4-6.5)  


 


Sodium    (132-148)  mmol/L


 


Potassium    (3.6-5.0)  mmol/L


 


Chloride    ()  mmol/L


 


Carbon Dioxide    (21-33)  mmol/L


 


Anion Gap    (10-20)  


 


BUN    (7-21)  mg/dL


 


Creatinine    (0.8-1.5)  mg/dl


 


Est GFR (African Amer)    


 


Est GFR (Non-Af Amer)    


 


POC Glucose (mg/dL)  155 H  344 H  ()  mg/dL


 


Random Glucose    ()  mg/dL


 


Calcium    (8.4-10.5)  mg/dL


 


Phosphorus    (2.5-4.5)  mg/dL


 


Magnesium    (1.7-2.2)  mg/dL


 


Total Bilirubin    (0.2-1.3)  mg/dL


 


Direct Bilirubin    (0.0-0.4)  mg/dL


 


AST    (17-59)  U/L


 


ALT    (7-56)  U/L


 


Alkaline Phosphatase    ()  U/L


 


NT-Pro-B Natriuret Pep    (0-450)  pg/mL


 


Total Protein    (5.8-8.3)  g/dL


 


Albumin    (3.0-4.8)  g/dL


 


Globulin    gm/dL


 


Albumin/Globulin Ratio    (1.1-1.8)  








                         Laboratory Results - last 24 hr











  02/17/19 02/17/19 02/18/19





  16:16 21:18 06:35


 


WBC   


 


RBC   


 


Hgb   


 


Hct   


 


MCV   


 


MCH   


 


MCHC   


 


RDW   


 


Plt Count   


 


MPV   


 


Neut % (Auto)   


 


Lymph % (Auto)   


 


Mono % (Auto)   


 


Eos % (Auto)   


 


Baso % (Auto)   


 


Lymph # (Auto)   


 


Mono # (Auto)   


 


Eos # (Auto)   


 


Baso # (Auto)   


 


Absolute Neuts (auto)   


 


Sodium    139


 


Potassium    3.5 L


 


Chloride    104


 


Carbon Dioxide    27


 


Anion Gap    12


 


BUN    19


 


Creatinine    1.0


 


Est GFR ( Amer)    > 60


 


Est GFR (Non-Af Amer)    > 60


 


POC Glucose (mg/dL)  344 H  155 H 


 


Random Glucose    127 H


 


Calcium    8.8


 


Phosphorus    2.4 L


 


Magnesium    2.2


 


Total Bilirubin    0.9


 


Direct Bilirubin    0.6 H


 


AST    24


 


ALT    24


 


Alkaline Phosphatase    57


 


NT-Pro-B Natriuret Pep    2120 H


 


Total Protein    5.9


 


Albumin    3.1


 


Globulin    2.8


 


Albumin/Globulin Ratio    1.1














  02/18/19 02/18/19





  06:35 07:33


 


WBC  4.6 


 


RBC  3.99 


 


Hgb  12.2 L 


 


Hct  37.1 L 


 


MCV  93.0 


 


MCH  30.6 


 


MCHC  32.9 


 


RDW  14.0 


 


Plt Count  138 


 


MPV  9.9 


 


Neut % (Auto)  72.4 H 


 


Lymph % (Auto)  14.0 L 


 


Mono % (Auto)  8.6 H 


 


Eos % (Auto)  5.0 


 


Baso % (Auto)  0.0 


 


Lymph # (Auto)  0.7 L 


 


Mono # (Auto)  0.4 


 


Eos # (Auto)  0.2 


 


Baso # (Auto)  0.00 


 


Absolute Neuts (auto)  3.36 


 


Sodium  


 


Potassium  


 


Chloride  


 


Carbon Dioxide  


 


Anion Gap  


 


BUN  


 


Creatinine  


 


Est GFR ( Amer)  


 


Est GFR (Non-Af Amer)  


 


POC Glucose (mg/dL)   128 H


 


Random Glucose  


 


Calcium  


 


Phosphorus  


 


Magnesium  


 


Total Bilirubin  


 


Direct Bilirubin  


 


AST  


 


ALT  


 


Alkaline Phosphatase  


 


NT-Pro-B Natriuret Pep  


 


Total Protein  


 


Albumin  


 


Globulin  


 


Albumin/Globulin Ratio  











Fingerstick Blood Sugar Results: 241





Review of Systems





- Review of Systems


Review of Systems: 





per HPI





Critical Care Progress Note





- Nutrition


Nutrition: 


                                    Nutrition











 Category Date Time Status


 


 Altered GI/Hepatic Diet [DIET] Diets  02/17/19 Dinner Ordered














Assessment/Plan





- Assessment and Plan (Free Text)


Assessment: 





63 yo F with PMH of migraines presents to [facility] [place] for left-sided deficits. Will 

admit to the ICU for close monitoring due to parenchymal hematoma in the right 

frontal parietal lobe with midline shift.


Plan: 





Neuro


AOx3


Maintain normothermia





Cardio:


HFrEF


EF noted to be 20-25% during cath


continue lasix


 


CAD s/p stents


continue B-blocker and ACEi 


Asa/plavix resume yesterday. ASA held by GI. 


Continue to monitor closely for signs of GI bleed 





Pulmonary HTN


currently on Milrinone gtt


Maintain MAP > 65


Cardiology following





Pulm


Maintain O2 sat > 92%





GI


Blood per rectum s/p colonoscopy 


colonoscopy revealed diverticula, internal hemorrhoids and AVMs with no active 

bleed


per GI, bleeding may be 2/2 DAPT initially


resume DAPT per GI/cardio recs


Protonix BID


Advance diet as tolerated per GI recs





Renal


Replete K


Cont to monitor electrolytes, replete as needed


Maintain euvolemia





Heme


Blood per rectum


hemodynamically stable


monitor BP





ID


No longer on abx





Endo


DM2


ISS-Low


Accuchecks


Maintain euglycemia





Dispo: Pt is feeling better. No reports of blood per rectum. He is 

hemodynamically stable. He no longer requires ICU care. He will be transferred 

to remote telemetry. Please re-consult as necessary.





Case seen, examined and discussed with attending physician, Dr. Juana Clifton PGY1





<Jesus Arias - Last Filed: 02/18/19 16:00>





CCU Objective





- Vital Signs / Intake & Output


Vital Signs (Last 4 hours): 


Vital Signs











  Temp Pulse Resp Pulse Ox


 


 02/18/19 14:00   69  


 


 02/18/19 12:00  97.8 F  68  22  95











Intake and Output (Last 8hrs): 


                                 Intake & Output











 02/18/19 02/18/19 02/18/19





 06:59 14:59 22:59


 


Intake Total 348  


 


Output Total 400  


 


Balance -52  


 


Weight 63.004 kg  


 


Intake:   


 


    


 


    Right distal forearm 108  


 


  Oral 240  


 


Output:   


 


  Urine 400  


 


    Urine, Voided 400  


 


Other:   


 


  # Bowel Movements 1  














- Medications


Active Medications: 


Active Medications











Generic Name Dose Route Start Last Admin





  Trade Name Freq  PRN Reason Stop Dose Admin


 


Acetaminophen  650 mg  02/12/19 11:06  





  Tylenol 325mg Tab  PO   





  Q6 PRN   





  TEMP>=99.5F   





     





     





     


 


Acetaminophen  650 mg  02/12/19 11:06  





  Tylenol 650 Mg Supp  RC   





  Q6H PRN   





  TEMP>=99.5F   





     





     





     


 


Acetylcysteine  4 ml  02/18/19 08:00  02/18/19 11:21





  Acetylcysteine 20%  IH   Not Given





  BIDRESP GRISELDA   





     





     





     





     


 


Atorvastatin Calcium  40 mg  02/12/19 17:00  02/17/19 18:07





  Lipitor  PO   40 mg





  DIN RGISELDA   Administration





     





     





     





     


 


Carvedilol  12.5 mg  02/12/19 10:00  02/18/19 09:01





  Coreg  PO   12.5 mg





  BID GRISELDA   Administration





     





     





     





     


 


Clopidogrel Bisulfate  75 mg  02/17/19 13:30  02/18/19 09:03





  Plavix  PO   75 mg





  DAILY GRISELDA   Administration





     





     





     





     


 


Dextrose  0 ml  02/12/19 11:06  02/15/19 21:50





  Dextrose 50% Inj  IV   50 ml





  STAT PRN   Administration





  Hypoglycemia Protocol   





     





  Protocol   





     


 


Docusate Sodium  100 mg  02/12/19 14:00  02/18/19 14:14





  Colace  PO   100 mg





  TID GRISELDA   Administration





     





     





     





     


 


Famotidine  40 mg  02/12/19 22:00  02/17/19 21:48





  Pepcid  PO   40 mg





  HS GRISELDA   Administration





     





     





     





     


 


Ferrous Sulfate  324 mg  02/15/19 10:00  02/18/19 14:14





  Feosol  PO   324 mg





  TID GRISELDA   Administration





     





     





     





     


 


Furosemide  40 mg  02/14/19 10:00  02/18/19 09:39





  Lasix  IVP   40 mg





  DAILY GRISELDA   Administration





     





     





     





     


 


Dextrose  1,000 mls @ 0 mls/hr  02/12/19 11:06  





  Dextrose 5% In Water 1000 Ml  IV   





  .Q0M PRN   





  Hypoglycemia Protocol   





     





  Protocol   





  Per Protocol   


 


Insulin Human Regular  0 units  02/15/19 11:30  02/18/19 11:14





  Humulin R Low  SC   2 u





  ACHS GRISELDA   Administration





     





     





  Protocol   





     


 


Levalbuterol HCl  0.63 mg  02/11/19 23:24  





  Xopenex  IH   





  [id number] PRN   





  Shortness of Breath   





     





     





     


 


Levalbuterol HCl  0.63 mg  02/17/19 01:26  02/18/19 11:21





  Xopenex  IH   Not Given





  TIDRESP [facility]   





     





     





     





     


 


Levothyroxine Sodium  25 mcg  02/12/19 06:00  02/18/19 05:44





  Synthroid  PO   25 mcg





  0600 GRISELDA   Administration





     





     





     





     


 


Magnesium Oxide  400 mg  02/14/19 10:00  02/18/19 09:03





  Mag-Ox  PO   400 mg





  BID GRISELDA   Administration





     





     





     





     


 


Ondansetron HCl  4 mg  02/12/19 11:06  





  Zofran Inj  IVP   





  Q4H PRN   





  Nausea/Vomiting   





     





     





     


 


Polyethylene Glycol  17 gm  02/18/19 18:00  





  Miralax  PO   





  BID [facility]   





     





     





     





     


 


Potassium Chloride  20 meq  02/18/19 08:30  02/18/19 10:00





  K-Dur 20 Meq Er Tab  PO   Not Given





  BID GRISELDA   





     





     





     





     


 


Ramipril  10 mg  02/18/19 10:00  02/18/19 09:40





  Altace  PO   10 mg





  DAILY GRISELDA   Administration





     





     





     





     














- Patient Studies


Lab Studies: 


                                   Lab Studies











  02/18/19 02/18/19 02/18/19 Range/Units





  07:33 06:35 06:35 


 


WBC   4.6   (4.5-11.0)  10^3/uL


 


RBC   3.99   (3.5-6.1)  10^6/uL


 


Hgb   12.2 L   (14.0-18.0)  g/dL


 


Hct   37.1 L   (42.0-52.0)  %


 


MCV   93.0   (80.0-105.0)  fl


 


MCH   30.6   (25.0-35.0)  pg


 


MCHC   32.9   (31.0-37.0)  g/dl


 


RDW   14.0   (11.5-14.5)  %


 


Plt Count   138   (120.0-450.0)  10^3/uL


 


MPV   9.9   (7.0-11.0)  fl


 


Neut % (Auto)   72.4 H   (50.0-68.0)  %


 


Lymph % (Auto)   14.0 L   (22.0-35.0)  %


 


Mono % (Auto)   8.6 H   (1.0-6.0)  %


 


Eos % (Auto)   5.0   (1.5-5.0)  %


 


Baso % (Auto)   0.0   (0.0-3.0)  %


 


Lymph # (Auto)   0.7 L   (1.2-3.4)  


 


Mono # (Auto)   0.4   (0.1-0.6)  


 


Eos # (Auto)   0.2   (0.0-0.7)  


 


Baso # (Auto)   0.00   (0.0-2.0)  K/mm3


 


Absolute Neuts (auto)   3.36   (1.4-6.5)  


 


Sodium    139  (132-148)  mmol/L


 


Potassium    3.5 L  (3.6-5.0)  mmol/L


 


Chloride    104  ()  mmol/L


 


Carbon Dioxide    27  (21-33)  mmol/L


 


Anion Gap    12  (10-20)  


 


BUN    19  (7-21)  mg/dL


 


Creatinine    1.0  (0.8-1.5)  mg/dl


 


Est GFR (African Amer)    > 60  


 


Est GFR (Non-Af Amer)    > 60  


 


POC Glucose (mg/dL)  128 H    ()  mg/dL


 


Random Glucose    127 H  ()  mg/dL


 


Calcium    8.8  (8.4-10.5)  mg/dL


 


Phosphorus    2.4 L  (2.5-4.5)  mg/dL


 


Magnesium    2.2  (1.7-2.2)  mg/dL


 


Total Bilirubin    0.9  (0.2-1.3)  mg/dL


 


Direct Bilirubin    0.6 H  (0.0-0.4)  mg/dL


 


AST    24  (17-59)  U/L


 


ALT    24  (7-56)  U/L


 


Alkaline Phosphatase    57  ()  U/L


 


NT-Pro-B Natriuret Pep    2120 H  (0-450)  pg/mL


 


Total Protein    5.9  (5.8-8.3)  g/dL


 


Albumin    3.1  (3.0-4.8)  g/dL


 


Globulin    2.8  gm/dL


 


Albumin/Globulin Ratio    1.1  (1.1-1.8)  














  02/17/19 02/17/19 Range/Units





  21:18 16:16 


 


WBC    (4.5-11.0)  10^3/uL


 


RBC    (3.5-6.1)  10^6/uL


 


Hgb    (14.0-18.0)  g/dL


 


Hct    (42.0-52.0)  %


 


MCV    (80.0-105.0)  fl


 


MCH    (25.0-35.0)  pg


 


MCHC    (31.0-37.0)  g/dl


 


RDW    (11.5-14.5)  %


 


Plt Count    (120.0-450.0)  10^3/uL


 


MPV    (7.0-11.0)  fl


 


Neut % (Auto)    (50.0-68.0)  %


 


Lymph % (Auto)    (22.0-35.0)  %


 


Mono % (Auto)    (1.0-6.0)  %


 


Eos % (Auto)    (1.5-5.0)  %


 


Baso % (Auto)    (0.0-3.0)  %


 


Lymph # (Auto)    (1.2-3.4)  


 


Mono # (Auto)    (0.1-0.6)  


 


Eos # (Auto)    (0.0-0.7)  


 


Baso # (Auto)    (0.0-2.0)  K/mm3


 


Absolute Neuts (auto)    (1.4-6.5)  


 


Sodium    (132-148)  mmol/L


 


Potassium    (3.6-5.0)  mmol/L


 


Chloride    ()  mmol/L


 


Carbon Dioxide    (21-33)  mmol/L


 


Anion Gap    (10-20)  


 


BUN    (7-21)  mg/dL


 


Creatinine    (0.8-1.5)  mg/dl


 


Est GFR (African Amer)    


 


Est GFR (Non-Af Amer)    


 


POC Glucose (mg/dL)  155 H  344 H  ()  mg/dL


 


Random Glucose    ()  mg/dL


 


Calcium    (8.4-10.5)  mg/dL


 


Phosphorus    (2.5-4.5)  mg/dL


 


Magnesium    (1.7-2.2)  mg/dL


 


Total Bilirubin    (0.2-1.3)  mg/dL


 


Direct Bilirubin    (0.0-0.4)  mg/dL


 


AST    (17-59)  U/L


 


ALT    (7-56)  U/L


 


Alkaline Phosphatase    ()  U/L


 


NT-Pro-B Natriuret Pep    (0-450)  pg/mL


 


Total Protein    (5.8-8.3)  g/dL


 


Albumin    (3.0-4.8)  g/dL


 


Globulin    gm/dL


 


Albumin/Globulin Ratio    (1.1-1.8)  








                         Laboratory Results - last 24 hr











  02/17/19 02/17/19 02/18/19





  16:16 21:18 06:35


 


WBC   


 


RBC   


 


Hgb   


 


Hct   


 


MCV   


 


MCH   


 


MCHC   


 


RDW   


 


Plt Count   


 


MPV   


 


Neut % (Auto)   


 


Lymph % (Auto)   


 


Mono % (Auto)   


 


Eos % (Auto)   


 


Baso % (Auto)   


 


Lymph # (Auto)   


 


Mono # (Auto)   


 


Eos # (Auto)   


 


Baso # (Auto)   


 


Absolute Neuts (auto)   


 


Sodium    139


 


Potassium    3.5 L


 


Chloride    104


 


Carbon Dioxide    27


 


Anion Gap    12


 


BUN    19


 


Creatinine    1.0


 


Est GFR ( Amer)    > 60


 


Est GFR (Non-Af Amer)    > 60


 


POC Glucose (mg/dL)  344 H  155 H 


 


Random Glucose    127 H


 


Calcium    8.8


 


Phosphorus    2.4 L


 


Magnesium    2.2


 


Total Bilirubin    0.9


 


Direct Bilirubin    0.6 H


 


AST    24


 


ALT    24


 


Alkaline Phosphatase    57


 


NT-Pro-B Natriuret Pep    2120 H


 


Total Protein    5.9


 


Albumin    3.1


 


Globulin    2.8


 


Albumin/Globulin Ratio    1.1














  02/18/19 02/18/19





  06:35 07:33


 


WBC  4.6 


 


RBC  3.99 


 


Hgb  12.2 L 


 


Hct  37.1 L 


 


MCV  93.0 


 


MCH  30.6 


 


MCHC  32.9 


 


RDW  14.0 


 


Plt Count  138 


 


MPV  9.9 


 


Neut % (Auto)  72.4 H 


 


Lymph % (Auto)  14.0 L 


 


Mono % (Auto)  8.6 H 


 


Eos % (Auto)  5.0 


 


Baso % (Auto)  0.0 


 


Lymph # (Auto)  0.7 L 


 


Mono # (Auto)  0.4 


 


Eos # (Auto)  0.2 


 


Baso # (Auto)  0.00 


 


Absolute Neuts (auto)  3.36 


 


Sodium  


 


Potassium  


 


Chloride  


 


Carbon Dioxide  


 


Anion Gap  


 


BUN  


 


Creatinine  


 


Est GFR ( Amer)  


 


Est GFR (Non-Af Amer)  


 


POC Glucose (mg/dL)   128 H


 


Random Glucose  


 


Calcium  


 


Phosphorus  


 


Magnesium  


 


Total Bilirubin  


 


Direct Bilirubin  


 


AST  


 


ALT  


 


Alkaline Phosphatase  


 


NT-Pro-B Natriuret Pep  


 


Total Protein  


 


Albumin  


 


Globulin  


 


Albumin/Globulin Ratio  














Critical Care Progress Note





- Nutrition


Nutrition: 


                                    Nutrition











 Category Date Time Status


 


 Altered GI/Hepatic Diet [DIET] Diets  02/17/19 Dinner Ordered














Addendum


Addendum: 





02/18/19 15:58


ICU Attending Addendum 


Patient seen and examined. Case reviewed on round with housestaff.  Agree with 

resident note above with the following additions/exceptions





85M PMHx HTN, CAD s/p CABG, T2DM, HLD, and asthma found to have NSTEMI s/p  

placement of three bare-metal stent in the mid- and proximal RCA


Complicated by lower gi bleed


s/p colonoscopy on 2/15 showing diverticulosis and AVM





no signs of bleeding after restarting asa and plavix





CAD


CABG


s/p PCI


DMII


HLD


lower gibleed





hemodynamically stable


resume asa 2/16 - no signs of bleeding


resume plavix 2/17


on BB coreg 12.5 BID


cont ramirpil at 10mg 





GI consulted, Dr Dobbs


Cardiology follow up





doubt infection, woudl stop abx 


abx per ID





monitor for any bleeding and hemopdynamics





stable for transfer out of ICU





Rest of care as above in housestaff note





Jesus Arias MD


Pulmonary Critical Care Attending

## 2019-02-19 NOTE — PN
DATE:  02/19/2019



SUBJECTIVE:  The patient is seen in ICU, bed 1.  The patient's nephew is at

bedside.  The patient is awake, responsive.  The patient is off the

Primacor drip.  Overnight nurse's notes were reviewed.



PHYSICAL EXAMINATION

GENERAL:  The patient was found to be alert, awake, oriented x2.  The

patient is seen sitting up in the bed.

VITAL SIGNS:  T-max 98.7, heart rate 70, 75, 79, blood pressure 117/78,

138/59, 135/49, 113/64, respiration 18 to 22, O2 sat is 95% to 100%.

HEENT:  Head is normocephalic, atraumatic.  HEENT examination shows pinkish

conjunctivae.  Anicteric sclerae.  No oropharyngeal lesion.

NECK:  No neck rigidity.

CHEST:  Shows median sternotomy surgical scar.

LUNGS:  Shows positive rhonchi, left more than the right, questionable

decreased breath sound at the left base.  CARDIOVASCULAR:  S1, S2, regular

rhythm.  Positive systolic murmur at left sternal border, right second

intercostal space, left second intercostal space.

ABDOMEN:  Soft.  Positive bowel sound.  No palpable hepatosplenomegaly.

GENITALIA:  Male.

RECTAL:  Deferred.

EXTREMITIES:  Shows no pitting edema, no calf tenderness, no Homans' sign.

NEUROLOGIC:  The patient is alert, awake, responsive.  He is able to move

upper and lower extremity without assistance.  Gait examination not tested.



DIAGNOSTICS:  On 02/19/2019, hemoglobin/hematocrit 13.2, 40.0.  CMP, LFT

shows a glucose of 155, phosphorus is 2.3.  Rest of the CMP, LFTs are

negative.  Microbiology, MRSA, urine cultures, blood cultures all negative.

The patient received 1 unit of PRBC.  The patient's left upper extremity

ultrasound was done because of the swelling of the left extremity which was

negative for the DVT of the left upper extremity.



IMPRESSION:

1.  Acute non-ST elevation myocardial infarction with elevated troponin.

2.  History of coronary artery disease, coronary artery bypass graft.

3.  Status post successful angioplasty and stent placement of the multiple

lesions of the right coronary artery using bare-metal stent.

4.  Ischemic dilated cardiomyopathy with ejection fraction of 25%.

5.  Diffusely dilated and diffusely hypokinetic left ventricle.

6.  Multivessel coronary artery disease.

7.  70% to 80% stenosis of the proximal right coronary artery extending

into the midportion with 99% stenosis of the mid right coronary artery with

diffuse disease of the long critical 80% to 90% stenosis of the patent

ductus arteriosus.

8.  Proximal occlusion of the left anterior descending.

9.  Subtotal occlusion of the proximal left circumflex.

10.   Patent left internal mammary artery to left anterior descending with

antegrade flow to mid and distal left anterior descending.

11.  50% to 60% stenosis of the mid left anterior descending.

12.  Occluded saphenous vein graft to the right coronary artery.

13.  Acute blood loss anemia with lower gastrointestinal bleeding.

14.  Sigmoid colon diverticulosis.

15.  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

16.  Internal hemorrhoids.

17.  Multiple nonbleeding colonic angiodysplastic lesion.

18.  Granulocytosis.

19.  Leukopenia.

20.  Anemia.

21.  Lactic acidosis.

22.  Hypokalemia.

23.  Non-insulin-requiring diabetes mellitus with hemoglobin A1c of 7.1 and

hyperfructosemia.

24.  Hypomagnesemia, hypokalemia, hypophosphatemia.

25.  Hypovitaminosis D.

26.  Pulmonary hypertension with right ventricular systolic pressure of 70

mmHg and severe tricuspid regurgitation, onset of severe pulmonary

hypertension.

27.  Global left ventricular hypokinesis and severely impaired left

ventricular ejection fraction and mildly dilated right atrium.

28.  Severely dilated left and right atrium.

29.  Moderate aortic regurgitation with mildly sclerotic aortic valve.

30.  Severe mitral regurgitation.

31.  Mild pulmonic regurgitation.

32.  Large left pleural effusion.

33.  Dilated ischemic cardiomyopathy.



PLAN:  At this time, the patient will be considered for transfer out of the

ICU.  The patient has been ordered repeat labs.  The patient has been

ordered TCU evaluation.  Diabetic education evaluation has been ordered.



CURRENT MEDICATIONS:  Mucomyst nebulizer with Xopenex nebulizer every 6

hours.  The patient has been ordered Xopenex and Mucomyst nebulizer in

combination.  The patient has been ordered Altace 10 mg daily, Colace 100

mg three times a day, Coreg 12.5 twice a day, ferrous sulfate 324 three

times a day, Humulin low-dose sliding scale coverage, K-Dur 20 mEq twice a

day, Lasix 40 IV daily, Lipitor 40 mg daily, MiraLax 17 g twice a day,

Neutra-Phos 1 packet every 6 hours, 8 doses, Pepcid 40 mg at bedtime,

Plavix 75 mg daily, Synthroid 25 mcg daily, Zofran 4 IV every 4 p.r.n.



The patient's condition, diagnosis, treatment options, recommendation by

all physician, diagnostic test results, all discussed and explained to the

patient's nephew at length.  All questions concerned answered to his

satisfaction.  The patient has been ordered out of bed to chair, physical

therapy, occupational therapy has been ordered.



Dictated and electronically signed, not read.







__________________________________________

Сергей Harris MD





DD:  02/19/2019 8:39:43

DT:  02/19/2019 8:44:50

Job # [id number]

## 2019-02-19 NOTE — PN
DATE:  02/19/2019



CARDIOLOGY FOLLOWUP



SUBJECTIVE:  The patient is asymptomatic.



PHYSICAL EXAMINATION:

VITAL SIGNS:  Revealed blood pressure of 157/75, heart rate in the 70s.

NECK:  Negative JVD.

LUNGS:  Without rales.

HEART:  S1, S2.

EXTREMITIES:  Without edema.



LABORATORY DATA:  BUN and creatinine are unremarkable.  Glucose 155. 

Hemoglobin is stable at 13.2.



IMPRESSION:

1.  Status post non-STEMI.

2.  Coronary artery disease.

3.  Status post percutaneous transluminal coronary angioplasty and stent of

two lesions in the right coronary artery with bare-metal stents.

4.  History of coronary bypass surgery.

5.  Ischemic dilated cardiomyopathy.

6.  Anemia.



PLAN:  Given these findings, the patient is tolerating baby aspirin and

Plavix with no evidence for bleeding.  From a cardiac perspective, the

patient can be transferred to subacute rehab.  The patient will need to

remain on Plavix for 1 month.







__________________________________________

Onel Ann MD





DD:  02/19/2019 16:22:56

DT:  02/19/2019 16:24:52

Job # [id number]

## 2019-02-20 NOTE — PN
DATE:  02/20/2019



SUBJECTIVE:  The patient is lying in bed.  He is sleeping.  According to

nursing, there has been no further rectal bleeding.  There is no chest pain

or shortness of breath.



PHYSICAL EXAMINATION

VITAL SIGNS:  Reveal temperature of 97.8, blood pressure 122/77, heart rate

69.

HEENT:  Reveal sclerae to be white.  Conjunctivae pale.

NECK:  Supple.

CHEST:  Lungs are clear.

HEART:  Reveals a regular rate and rhythm.

ABDOMEN:  Soft, nontender.

EXTREMITIES:  Show no edema.



LABORATORY DATA:  Reveals hemoglobin 12.9 which is stable, white blood cell

count 5.9.  Chemistries reveal normal electrolytes.



IMPRESSION:  An 85-year-old male with lower gastrointestinal bleed with

stable blood count for the last 76 hours.  Colonoscopy did show sigmoid

diverticulosis and cecal arteriovenous malformations.  The patient had

recent bare-metal coronary artery stent placed for coronary artery disease.

He is currently on Plavix.  There has been no sign of re-bleed at this

time.







__________________________________________

Se Hill MD





DD:  02/20/2019 10:58:17

DT:  02/20/2019 10:59:58

Job # [id number]

## 2019-02-20 NOTE — DS
FINAL PROGRESS NOTE AND DISCHARGE SUMMARY



LOCATION:  The patient is seen in ICU, Bed 1.



HISTORY OF PRESENT ILLNESS:  The patient is seen lying in the bed.  The

patient is comfortable, in no distress.



REVIEW OF SYSTEMS:  A 14-system review of systems was negative.



PHYSICAL EXAMINATION

VITAL SIGNS:  T-max is 97.8.  Telemetry shows sinus rhythm, heart rate 69

to 74, blood pressure is 132/67, 128/67, 122/83, 116/67; respirations 18 to

20; O2 sat is 94% to 97%.  Blood pressure 116/70, 132/67, 128/67, 122/83. 

Intake and output.

HEENT:  Head examination is normocephalic, atraumatic.  HEENT examination

shows pinkish pale conjunctiva.  Anicteric sclerae.  No oropharyngeal

lesion.

NECK::  No neck rigidity.

CHEST:  Kyphosis.  Positive median sternotomy surgical scar.

LUNGS:  Occasional rhonchi left more than the right.

CARDIOVASCULAR:  S1 and S2.  Positive systolic murmur at left sternal

border, right second intercostal space, left second intercostal space.

ABDOMEN:  Soft.  Positive bowel sounds.  No palpable hepatosplenomegaly.

GENITALIA:  Male.

RECTAL:  Deferred.

EXTREMITIES:  Shows no pitting edema, no calf tenderness, no Homans' sign.

NEUROLOGIC:  The patient is alert, awake, responsive.  He is able to move

upper and lower extremity without assistance.  Gait examination not tested.

Vascular examination, palpable pulses.  Cranial nerves II through XII

intact and limited.



DIAGNOSTIC DATA:  On 02/20/2019, hemoglobin and hematocrit 12.9 and 40.3,

platelet 154.  CMP and LFTs are within normal limit with random glucose of

160.



FINAL IMPRESSION, PLAN, AND DISCHARGE DIAGNOSES:

1.  Acute non-ST elevation myocardial infarction with elevated troponin.

2.  Status post successful angioplasty and bare-metal stent placement of

the multiple lesions of the right coronary artery.

3.  Dilated ischemic cardiomyopathy.

4.  Acute blood loss anemia with lower gastrointestinal bleeding and bright

red blood per rectum.

5.  Sigmoid colon medium sized mouthed diverticulosis.

6.  Descending colon and cecum, large angiodysplastic lesion without

bleeding.

7.  Internal hemorrhoids.

8.  Hypertension.

9.  Coronary artery disease, coronary artery bypass graft.

10.  Non-insulin requiring diabetes mellitus.

11.  Iron deficiency anemia.

12.  Hypokalemia.

13.  Hyperlipidemia.

14.  Hypomagnesemia.

15.  Constipation.

16.  Hypophosphatemia.

17.  Hypothyroidism.

18.  Dilated ischemic cardiomyopathy.

19.  Acute systolic congestive heart failure.

20.  Severe pulmonary hypertension with right ventricular systolic pressure

of 70 mmHg and severe tricuspid regurgitation.

21.  Left upper lobe, left lower lobe healthcare-associated versus

community-acquired pneumonia and effusion.

22.  Cardiomegaly.

23.  Coronary artery calcification.

24.  Moderate left pleural effusion and small right pleural effusion.

25.  Anterior longitudinal ligament degenerative changes.



At this time, the patient's nephewGeovanni has agreed the patient to be

transferred to [facility] under Dr. Harris's service.  The patient has been

cleared for discharge.  The patient is to be discharged.



DISCHARGE MEDICATIONS:  Tylenol 650 mg every 6 hours p.r.n., Mucomyst

nebulizer 4 mL 20% every 6 hours with Xopenex nebulizer 0.63 mg every 6

hours, Ecotrin 81 mg daily, Lipitor 40 mg daily, Coreg 12.5 twice a day,

Pepcid 40 mg at bedtime, Lasix 40 mg p.o. b.i.d., Xopenex nebulizer 0.63 mg

every 6 hours round-the-clock every 12 hours p.r.n., Synthroid 25 mcg

daily, magnesium oxide 400 mg twice a day, Starlix 120 mg three times a

day, Neutra-Phos discontinued, MiraLax 17 g twice a day, K-Dur 20 mEq twice

a day, Altace 10 mg daily.



The patient's revised discharge medications; the patient's potassium is

decreased to 20 mEq p.o. daily as compared to twice a day.  The patient's

Plavix has been ordered 75 mg daily for one month as per Cardiology

recommendation.  Tylenol 650 mg p.o. every 6 hours, Mucomyst nebulizer 4 mL

every 6 hours 20% with Xopenex nebulizer 0.63 mg every 6 hours,Ecotrin 81

mg daily, Lipitor 40 mg daily, Coreg 12.5 twice a day, Plavix 75 mg daily

for one month, Pepcid 40 mg at bedtime, Lasix 40 mg twice a day, Synthroid

25 mcg daily, magnesium oxide 400 mg twice a day, Starlix 120 mg three

times a day, MiraLax 17 g twice a day, K-Dur 20 mEq once a day, Altace 10

mg daily.



ADDENDUM:  GI recommends to hold aspirin, so the patient's aspirin will be

discontinued and the patient will stay only on Plavix 75 mg daily.



Cardiology recommends Plavix one month.  GI recommends no aspirin at

present.  Watch the patient closely for GI bleeding.



The patient 's condition, diagnosis, treatment plan, all details discussed

and explained to the patient's nephew at length and all questions and

concerned answered.



Time spent in the entire discharge process more than 45 minutes.



Dictated and electronically signed, not read.







__________________________________________

Сергей Harris MD





DD:  02/20/2019 9:47:47

DT:  02/20/2019 13:25:18

Job # [id number]

## 2019-02-20 NOTE — CP.PCM.DIS
Provider





- Provider


Date of Admission: 


02/11/19 21:51





Attending physician: 


Сергей Harris MD





Primary care physician: 


Sue Hawthorne MD





Consults: 








02/11/19 23:45


Cardiology Consult Routine 


   Comment: 


   Consulting Provider: Mark Markham


   Consulting Physician: Mark Markham


   Reason for Consult: NSTEMI





02/12/19 01:36


Social Work Referral Routine 


   Comment: Abdoul score


   Physician Instructions: 


   Reason For Exam: Protocol





02/12/19 01:54


Nursing Referral for Wound Care Routine 


   Comment: Sacral redness


   Physician Instructions: 


   Reason For Exam: Protocol





02/12/19 09:17


Consult [Physician Consult] Routine 


   Comment: 


   Consulting Provider: Calin Randle


   Consulting Physician: Calin Randle


   Reason for Consult: Hernia with rule out SBO





02/12/19 11:05


Physiatry Consult Routine 


   Comment: 


   Consulting Provider: Onel Ann


   Consulting Physician: Onel Ann


   Reason for Consult: NSTEMI





02/12/19 11:15


Diabetic Education Referral DAILY 


   Comment: DIABETIC EDUCATION


   Physician Instructions: DIABETIC EDUCATION


   Reason For Exam: [facility]


TCU [Evaluation for TRCU] DAILY 


   Comment: TCU


   Physician Instructions: TCU


   Reason For Exam: TCU





02/13/19 11:15


Diabetic Education Referral DAILY 


   Comment: DIABETIC EDUCATION


   Physician Instructions: DIABETIC EDUCATION


   Reason For Exam: [facility]


TCU [Evaluation for TRCU] DAILY 


   Comment: TCU


   Physician Instructions: TCU


   Reason For Exam: TCU





02/14/19 01:53


Physician Consult Routine 


   Comment: rectal bleeding


   Consulting Provider: Se Hill


   Consulting Physician: Se Hill


   Reason for Consult: rectal bleeding





02/14/19 11:15


Diabetic Education Referral DAILY 


   Comment: DIABETIC EDUCATION


   Physician Instructions: DIABETIC EDUCATION


   Reason For Exam: [facility]


TCU [Evaluation for TRCU] DAILY 


   Comment: TCU


   Physician Instructions: TCU


   Reason For Exam: TCU





02/15/19 11:15


Diabetic Education Referral DAILY 


   Comment: DIABETIC EDUCATION


   Physician Instructions: DIABETIC EDUCATION


   Reason For Exam: [facility]


TCU [Evaluation for TRCU] DAILY 


   Comment: TCU


   Physician Instructions: TCU


   Reason For Exam: TCU





02/16/19 11:15


Diabetic Education Referral DAILY 


   Comment: DIABETIC EDUCATION


   Physician Instructions: DIABETIC EDUCATION


   Reason For Exam: [facility]


TCU [Evaluation for TRCU] DAILY 


   Comment: TCU


   Physician Instructions: TCU


   Reason For Exam: TCU





02/17/19 11:15


Diabetic Education Referral DAILY 


   Comment: DIABETIC EDUCATION


   Physician Instructions: DIABETIC EDUCATION


   Reason For Exam: [facility]


TCU [Evaluation for TRCU] DAILY 


   Comment: TCU


   Physician Instructions: TCU


   Reason For Exam: TCU





02/18/19 11:15


Diabetic Education Referral DAILY 


   Comment: DIABETIC EDUCATION


   Physician Instructions: DIABETIC EDUCATION


   Reason For Exam: [facility]


TCU [Evaluation for TRCU] DAILY 


   Comment: TCU


   Physician Instructions: TCU


   Reason For Exam: TCU





02/19/19 11:15


Diabetic Education Referral DAILY 


   Comment: DIABETIC EDUCATION


   Physician Instructions: DIABETIC EDUCATION


   Reason For Exam: [facility]


TCU [Evaluation for TRCU] DAILY 


   Comment: TCU


   Physician Instructions: TCU


   Reason For Exam: TCU





02/20/19 11:15


Diabetic Education Referral DAILY 


   Comment: DIABETIC EDUCATION


   Physician Instructions: DIABETIC EDUCATION


   Reason For Exam: [facility]


TCU [Evaluation for TRCU] DAILY 


   Comment: TCU


   Physician Instructions: TCU


   Reason For Exam: TCU





02/21/19 11:15


Diabetic Education Referral DAILY 


   Comment: DIABETIC EDUCATION


   Physician Instructions: DIABETIC EDUCATION


   Reason For Exam: [facility]


TCU [Evaluation for TRCU] DAILY 


   Comment: TCU


   Physician Instructions: TCU


   Reason For Exam: TCU





02/22/19 11:15


Diabetic Education Referral DAILY 


   Comment: DIABETIC EDUCATION


   Physician Instructions: DIABETIC EDUCATION


   Reason For Exam: [facility]


TCU [Evaluation for TRCU] DAILY 


   Comment: TCU


   Physician Instructions: TCU


   Reason For Exam: TCU





02/23/19 11:15


Diabetic Education Referral DAILY 


   Comment: DIABETIC EDUCATION


   Physician Instructions: DIABETIC EDUCATION


   Reason For Exam: [facility]


TCU [Evaluation for TRCU] DAILY 


   Comment: TCU


   Physician Instructions: TCU


   Reason For Exam: TCU





02/24/19 11:15


Diabetic Education Referral DAILY 


   Comment: DIABETIC EDUCATION


   Physician Instructions: DIABETIC EDUCATION


   Reason For Exam: [facility]


TCU [Evaluation for TRCU] DAILY 


   Comment: TCU


   Physician Instructions: TCU


   Reason For Exam: TCU





02/25/19 11:15


Diabetic Education Referral DAILY 


   Comment: DIABETIC EDUCATION


   Physician Instructions: DIABETIC EDUCATION


   Reason For Exam: [facility]


TCU [Evaluation for TRCU] DAILY 


   Comment: TCU


   Physician Instructions: TCU


   Reason For Exam: TCU





02/26/19 11:15


Diabetic Education Referral DAILY 


   Comment: DIABETIC EDUCATION


   Physician Instructions: DIABETIC EDUCATION


   Reason For Exam: [facility]


TCU [Evaluation for TRCU] DAILY 


   Comment: TCU


   Physician Instructions: TCU


   Reason For Exam: TCU





02/27/19 11:15


Diabetic Education Referral DAILY 


   Comment: DIABETIC EDUCATION


   Physician Instructions: DIABETIC EDUCATION


   Reason For Exam: [facility]


TCU [Evaluation for TRCU] DAILY 


   Comment: TCU


   Physician Instructions: TCU


   Reason For Exam: TCU





02/28/19 11:15


Diabetic Education Referral DAILY 


   Comment: DIABETIC EDUCATION


   Physician Instructions: DIABETIC EDUCATION


   Reason For Exam: [facility]


TCU [Evaluation for TRCU] DAILY 


   Comment: TCU


   Physician Instructions: TCU


   Reason For Exam: TCU





03/01/19 11:15


Diabetic Education Referral DAILY 


   Comment: DIABETIC EDUCATION


   Physician Instructions: DIABETIC EDUCATION


   Reason For Exam: [facility]


TCU [Evaluation for TRCU] DAILY 


   Comment: TCU


   Physician Instructions: TCU


   Reason For Exam: TCU











Time Spent in preparation of Discharge (in minutes): 40





Diagnosis





- Discharge Diagnosis


(1) Lower GI bleed


Status: Acute   





(2) CHF (congestive heart failure)


Status: Acute   





(3) NSTEMI (non-ST elevated myocardial infarction)


Status: Acute   





(4) Pneumonia


Status: Acute   





(5) Constipation


Status: Acute   





Hospital Course





- Lab Results


Lab Results: 


                                  Micro Results





02/12/19 10:30   Blood-Venous   Blood Culture - Final


                            NO GROWTH AFTER 5 DAYS


02/12/19 10:30   Blood-Venous   Gram Stain - Final


                            TEST NOT PERFORMED


02/12/19 10:00   Blood-Venous   Blood Culture - Final


                            NO GROWTH AFTER 5 DAYS


02/12/19 10:00   Blood-Venous   Gram Stain - Final


                            TEST NOT PERFORMED


02/14/19 11:15   Naris   MRSA Culture (Admit) - Final


                            MRSA NOT DETECTED


02/12/19 13:30   Naris   MRSA Culture (Admit) - Final


                            MRSA NOT DETECTED


02/12/19 13:30   Urine Random   Urine Culture - Final


                            No Growth (<1,000 CFU/ML)





                             Most Recent Lab Values











WBC  5.9 10^3/uL (4.5-11.0)   02/20/19  05:30    


 


RBC  4.25 10^6/uL (3.5-6.1)   02/20/19  05:30    


 


Hgb  12.9 g/dL (14.0-18.0)  L  02/20/19  05:30    


 


Hct  40.3 % (42.0-52.0)  L  02/20/19  05:30    


 


MCV  94.8 fl (80.0-105.0)   02/20/19  05:30    


 


MCH  30.4 pg (25.0-35.0)   02/20/19  05:30    


 


MCHC  32.0 g/dl (31.0-37.0)   02/20/19  05:30    


 


RDW  14.6 % (11.5-14.5)  H  02/20/19  05:30    


 


Plt Count  154 10^3/uL (120.0-450.0)   02/20/19  05:30    


 


MPV  9.6 fl (7.0-11.0)   02/20/19  05:30    


 


Neut % (Auto)  67.3 % (50.0-68.0)   02/20/19  05:30    


 


Lymph % (Auto)  15.8 % (22.0-35.0)  L  02/20/19  05:30    


 


Mono % (Auto)  11.4 % (1.0-6.0)  H  02/20/19  05:30    


 


Eos % (Auto)  5.3 % (1.5-5.0)  H  02/20/19  05:30    


 


Baso % (Auto)  0.2 % (0.0-3.0)   02/20/19  05:30    


 


Lymph # (Auto)  0.9  (1.2-3.4)  L  02/20/19  05:30    


 


Mono # (Auto)  0.7  (0.1-0.6)  H  02/20/19  05:30    


 


Eos # (Auto)  0.3  (0.0-0.7)   02/20/19  05:30    


 


Baso # (Auto)  0.01 K/mm3 (0.0-2.0)   02/20/19  05:30    


 


Absolute Neuts (auto)  3.97  (1.4-6.5)   02/20/19  05:30    


 


PT  15.8 SECONDS (9.4-12.5)  H  02/15/19  10:00    


 


INR  1.40   02/15/19  10:00    


 


APTT  31.6 Seconds (26.9-38.3)   02/15/19  10:00    


 


pCO2  39 mm/Hg (35-45)   02/11/19  23:59    


 


pO2  155 mm/Hg (30-55)  H  02/12/19  10:30    


 


HCO3  25.3 mmol/L (21-28)   02/11/19  23:59    


 


ABG pH  7.42  (7.35-7.45)   02/11/19  23:59    


 


ABG Total CO2  26.5 mmol.L (22-28)   02/11/19  23:59    


 


ABG O2 Saturation  99.1 % (95-98)  H  02/11/19  23:59    


 


ABG Base Excess  0.8 mmol/L (-2.0-3.0)   02/11/19  23:59    


 


ABG Potassium  2.6 mmol/L (3.6-5.2)  L  02/11/19  23:59    


 


VBG pH  7.45  (7.32-7.43)  H  02/12/19  10:30    


 


VBG pCO2  40.0  (40-60)   02/12/19  10:30    


 


VBG HCO3  27.8 mmol/l (21-28)   02/12/19  10:30    


 


VBG Total CO2  29.0 mmol.L (22-28)  H  02/12/19  10:30    


 


VBG O2 Sat (Calc)  100.0 % (40-65)  H  02/12/19  10:30    


 


VBG Base Excess  3.5 mmol/L (0.0-2.0)  H  02/12/19  10:30    


 


VBG Potassium  3.2 mmol/L (3.6-5.2)  L  02/12/19  10:30    


 


Sodium  138.0 mmol/L (132-148)   02/12/19  10:30    


 


Chloride  104.0 mmol/L ()   02/12/19  10:30    


 


Glucose  254 mg/dl ()  H  02/12/19  10:30    


 


Lactate  1.2 mmol/L (0.7-2.1)   02/12/19  10:30    


 


FiO2  21.0 %  02/12/19  10:30    


 


Crit Value Called To  Jolene   02/12/19  06:50    


 


Crit Value Called By  Ab   02/12/19  06:50    


 


Blood Gas Notified Time  708   02/12/19  06:50    


 


Sodium  137 mmol/L (132-148)   02/20/19  05:30    


 


Potassium  4.1 mmol/L (3.6-5.0)   02/20/19  05:30    


 


Chloride  99 mmol/L ()   02/20/19  05:30    


 


Carbon Dioxide  31 mmol/L (21-33)   02/20/19  05:30    


 


Anion Gap  12  (10-20)   02/20/19  05:30    


 


BUN  20 mg/dL (7-21)   02/20/19  05:30    


 


Creatinine  1.0 mg/dl (0.8-1.5)   02/20/19  05:30    


 


Est GFR ( Amer)  > 60   02/20/19  05:30    


 


Est GFR (Non-Af Amer)  > 60   02/20/19  05:30    


 


POC Glucose (mg/dL)  237 mg/dL ()  H  02/19/19  21:42    


 


Random Glucose  160 mg/dL ()  H  02/20/19  05:30    


 


Hemoglobin A1c  7.1 % (4.2-6.5)  H  02/12/19  07:30    


 


Fructosamine  290 umol/L (190-270)  H  02/12/19  10:30    


 


Uric Acid  7.0 mg/dL (3.5-8.5)   02/14/19  07:25    


 


Calcium  9.0 mg/dL (8.4-10.5)   02/20/19  05:30    


 


Phosphorus  2.8 mg/dL (2.5-4.5)   02/20/19  05:30    


 


Magnesium  1.9 mg/dL (1.7-2.2)   02/20/19  05:30    


 


Total Bilirubin  0.7 mg/dL (0.2-1.3)   02/20/19  05:30    


 


Direct Bilirubin  0.1 mg/dL (0.0-0.4)   02/20/19  05:30    


 


AST  27 U/L (17-59)   02/20/19  05:30    


 


ALT  20 U/L (7-56)   02/20/19  05:30    


 


Alkaline Phosphatase  63 U/L ()   02/20/19  05:30    


 


Troponin I  0.33 ng/mL H*  02/12/19  18:22    


 


NT-Pro-B Natriuret Pep  2120 pg/mL (0-450)  H  02/18/19  06:35    


 


Total Protein  6.5 g/dL (5.8-8.3)   02/20/19  05:30    


 


Albumin  3.4 g/dL (3.0-4.8)   02/20/19  05:30    


 


Globulin  3.0 gm/dL  02/20/19  05:30    


 


Albumin/Globulin Ratio  1.1  (1.1-1.8)   02/20/19  05:30    


 


Triglycerides  69 mg/dL ()   02/12/19  06:50    


 


Cholesterol  93 mg/dL (130-200)  L  02/12/19  06:50    


 


LDL Cholesterol Direct  49 mg/dL (0-129)   02/12/19  06:50    


 


HDL Cholesterol  33 mg/dL (29-60)   02/12/19  06:50    


 


25-OH Vitamin D Total  28.0 NG/ML (30.0-100.0)  L  02/12/19  10:30    


 


Procalcitonin  0.06 NG/ML (0.19-0.49)  L  02/12/19  00:45    


 


TSH 3rd Generation  3.04 mIU/mL (0.46-4.68)   02/12/19  06:50    


 


Arterial Blood Potassium  2.6 mmol/L (3.6-5.2)  L  02/11/19  23:59    


 


Venous Blood Potassium  3.2 mmol/L (3.6-5.2)  L  02/12/19  10:30    


 


Urine Color  Yellow  (YELLOW)   02/12/19  13:30    


 


Urine Appearance  Clear  (CLEAR)   02/12/19  13:30    


 


Urine pH  6.0  (4.7-8.0)   02/12/19  13:30    


 


Ur Specific Gravity  1.020  (1.005-1.035)   02/12/19  13:30    


 


Urine Protein  Negative mg/dL (<30 mg/dL)   02/12/19  13:30    


 


Urine Glucose (UA)  Negative mg/dL (NEGATIVE)   02/12/19  13:30    


 


Urine Ketones  Negative mg/dL (NEGATIVE)   02/12/19  13:30    


 


Urine Blood  Large  (NEGATIVE)  H  02/12/19  13:30    


 


Urine Nitrate  Negative  (NEGATIVE)   02/12/19  13:30    


 


Urine Bilirubin  Negative  (NEGATIVE)   02/12/19  13:30    


 


Urine Urobilinogen  1.0 E.U./dL (<1 E.U./dL)  H  02/12/19  13:30    


 


Ur Leukocyte Esterase  Negative Jone/uL (NEGATIVE)   02/12/19  13:30    


 


Urine RBC  10 - 15 /hpf (0-2)  H  02/12/19  13:30    


 


Urine WBC  0 - 2 /hpf (0-6)   02/12/19  13:30    


 


Ur Epithelial Cells  None /hpf (0-5)   02/12/19  13:30    


 


Urine Bacteria  Few /hpf (NONE)   02/12/19  13:30    


 


HIV 1&2 Ag/Ab, 4th Gen  Nonreactive  (Nonreactive)   02/12/19  10:30    


 


Influenza Typ A,B (EIA)  Negative for flu a/b  (NEGATIVE)   02/12/19  13:30    


 


Ur L.pneumophila Ag  Negative  (NEGATIVE)   02/12/19  13:30    


 


Mycoplasma pneumon IgG  >5.00  (<=0.90)  H  02/12/19  10:30    


 


Mycoplasma pneumon IgM  230 U/mL (<770)   02/12/19  10:30    


 


Ur Strep pneumoniae Ag  Not detected  (Not Detected)   02/12/19  13:30    


 


Blood Type  B POSITIVE   02/13/19  21:45    


 


Blood Type Confirm  B POSITIVE   02/13/19  22:17    


 


Antibody Screen  Negative   02/13/19  21:45    


 


Crossmatch  See Detail   02/13/19  21:45    


 


BBK History Checked  No verified bt   02/13/19  21:45    














- Hospital Course


Hospital Course: 





Mr Galindo, 85M, with PMHx CAD s/p CABG in 1990 on ASA/Plavix, HTN/HLD, DM2 (A1C 

7.1), asthma c/o Chest pain onset at rest associated with nausea, vomiting, 

diaphoresis. He was admitted on 2/12 for chest pain, found to have NSTEMI. Cath 

(2/13) revealed EF 20-25 and Triple vessel disease (1) SMYTH to LAD patent. (2) L

Cir closed (3) RCA 99% block (4) bypass graft to RCA close. He receive PCI with 

3 bare metal stents in native RCA and started milrinone gtt. Post-cath was 

complicated by lower GI bleed with BRBPR. He is s/p 1u pRBC for active bleeding.

He was transferred to ICU. Colonoscopy (2/15) showed several non-bleeding 

diverticulosis and angiodysplasia. GI bleed resolves the following days, H/H 

stable at 11 and he was off milronone gtt. He resumed on ASA/plavix, tolerating 

well, no signs of bleeding. He was downgraded to telemetry.





He had acute on chronic CHF with bilateral Pleural effusion (L > R). BNP was 

06247 on admission. He was managed by lasix s/p milrinone gtt. He was found to 

have pneumonia, likely due to Aspiration pnemonitis vs CAP. Procalcitonin is low

0.06. He completed a 5 days course on Rocephin, doxy, flagyl. He was on 

Mucomyst/Xopenex GRISELDA





CT abd/pelvis on admission (2/12) revealed a right-sided inguinal hernia that 

contains a loop of bowel. Surgery had ruled out obstruction or bowel 

incarceration. no plan for surgery. Last bowel movement 6 days prior to 

admission. Laxative was administered and constipation resolved.





At discharge, Hb is stable at 12-13. Vital sign stable. Discharge instructions 

provided to patient and BETTINA Gunter.





Discharge Exam





- Head Exam


Head Exam: ATRAUMATIC, NORMAL INSPECTION, NORMOCEPHALIC





- Eye Exam


Eye Exam: EOMI, Normal appearance, PERRL.  absent: Scleral icterus


Pupil Exam: NORMAL ACCOMODATION





- ENT Exam


ENT Exam: Mucous Membranes Moist





- Neck Exam


Additional comments: 





supple





- Respiratory Exam


Respiratory Exam: Clear to PA & Lateral, NORMAL BREATHING PATTERN.  absent: 

Rales, Rhonchi, Wheezes





- Cardiovascular Exam


Cardiovascular Exam: REGULAR RHYTHM, +S1, +S2





- GI/Abdominal Exam


GI & Abdominal Exam: Normal Bowel Sounds, Unremarkable





- Extremities Exam


Extremities exam: normal capillary refill, pedal edema (slight), pedal pulses 

present





- Back Exam


Back exam: absent: CVA tenderness (L), CVA tenderness (R)





- Neurological Exam


Neurological exam: Alert, CN II-XII Intact, Oriented x3





- Psychiatric Exam


Psychiatric exam: Normal Affect, Normal Mood





- Skin


Skin Exam: Dry, Warm





Discharge Plan





- Discharge Medications


Prescriptions: 


Clopidogrel [Plavix] 75 mg PO DAILY #30 tab


Furosemide [Lasix] 40 mg PO BID #60 tablet


Nateglinide [Starlix] 120 mg PO TID #90 tab


Potassium Chloride [K-Dur 20] 20 meq PO DAILY #30 tab





- Follow Up Plan


Condition: GUARDED


Disposition: REHAB FACILITY/REHAB UNIT


Instructions:  Heart Healthy Diet, Heart Attack (DC), What Can Go Wrong After a 

Heart Attack?, Myocardial Infarction (GEN)


Additional Instructions: 


MAY DISCHARGE TO [facility] UNDER  SERVICE 


DISCHARGE MEDICATIONS AS PER UPDATED AMBULATORY ORDERS


Patient needs to remain on Plavix for 1 month and long term aspirin


Follow up with Dr Harris, Primary care doctor, 1 week after discharge from rehab


Follow up with GI doctor, Dr Hill, 1 week after discharge from rehab


Follow up with Dr Ann, cardiologist, 1 week after discharge from rehab


Referrals: 


Onel Ann MD [Staff Provider] - 1 Week


Сергей Harris MD [Staff Provider] - 1 Week


(MAY DISCHARGE TO [facility] UNDER  SERVICE 


DISCHARGE MEDICATIONS AS PER UPDATED AMBULATORY ORDERS


)


Se Hill MD [Staff Provider] - 1 Week

## 2019-02-20 NOTE — PN
DATE:  02/20/2019



CARDIOLOGY FOLLOWUP



SUBJECTIVE:  The patient is sitting in bed, awake, alert, eating, without

chest pain.



PHYSICAL EXAMINATION:

VITAL SIGNS:  Blood pressure is 136/63, heart rates in the 70s.

NECK:  Negative JVD.

LUNGS:  Without rales.

HEART:  S1, S2.

EXTREMITIES:  Without edema.



LABORATORY DATA:  Hemoglobin is 12.9.  BUN and creatinine are unremarkable.

Glucose is 160.



IMPRESSION:

1.  Stable post multivessel percutaneous transluminal coronary angioplasty

and stent.

2.  Coronary artery disease.

3.  Recent ST elevation myocardial infarction

4.  History of coronary bypass surgery.

5.  Ischemic dilated cardiomyopathy.

6.  Anemia.



Given these findings, the patient is hemodynamically stable.  The patient

is scheduled for transfer to skilled nursing facility today.







__________________________________________

Onel Ann MD





DD:  02/20/2019 13:12:56

DT:  02/20/2019 13:15:44

Job # [id number]

## 2019-04-24 ENCOUNTER — HOSPITAL ENCOUNTER (OUTPATIENT)
Dept: HOSPITAL 42 - RAD | Age: 84
End: 2019-04-24
Payer: MEDICARE

## 2019-04-24 DIAGNOSIS — K59.09: Primary | ICD-10-CM

## 2019-04-24 DIAGNOSIS — K92.2: ICD-10-CM
